# Patient Record
Sex: FEMALE | URBAN - METROPOLITAN AREA
[De-identification: names, ages, dates, MRNs, and addresses within clinical notes are randomized per-mention and may not be internally consistent; named-entity substitution may affect disease eponyms.]

---

## 2019-09-30 ENCOUNTER — TELEPHONE (OUTPATIENT)
Dept: OBSTETRICS AND GYNECOLOGY | Facility: CLINIC | Age: 79
End: 2019-09-30

## 2019-09-30 NOTE — TELEPHONE ENCOUNTER
britni    Pt wanted to speak with nurse or dr ryder regarding pains she still is having.    She did have some tests with dr dee, a ct and endoscopy. Waiting on results of other test as well.

## 2023-09-13 ENCOUNTER — HOSPITAL ENCOUNTER (INPATIENT)
Facility: HOSPITAL | Age: 83
LOS: 7 days | Discharge: HOSPICE | DRG: 246 | End: 2023-09-20
Attending: INTERNAL MEDICINE | Admitting: INTERNAL MEDICINE
Payer: MEDICARE

## 2023-09-13 DIAGNOSIS — I21.4 NSTEMI (NON-ST ELEVATED MYOCARDIAL INFARCTION): Primary | ICD-10-CM

## 2023-09-13 PROBLEM — M10.9 GOUT: Status: ACTIVE | Noted: 2023-09-13

## 2023-09-13 PROBLEM — E11.9 DIABETES MELLITUS: Status: ACTIVE | Noted: 2023-09-13

## 2023-09-13 PROBLEM — I25.10 CORONARY ARTERY DISEASE: Status: ACTIVE | Noted: 2023-09-13

## 2023-09-13 PROBLEM — J43.9 EMPHYSEMA OF LUNG: Status: ACTIVE | Noted: 2023-09-13

## 2023-09-13 PROBLEM — I10 HYPERTENSION: Status: ACTIVE | Noted: 2023-09-13

## 2023-09-13 PROBLEM — N18.9 CKD (CHRONIC KIDNEY DISEASE): Status: ACTIVE | Noted: 2023-09-13

## 2023-09-13 LAB
ALBUMIN SERPL-MCNC: 3.3 G/DL (ref 3.5–5.2)
ALBUMIN/GLOB SERPL: 1 G/DL
ALP SERPL-CCNC: 63 U/L (ref 39–117)
ALT SERPL W P-5'-P-CCNC: 10 U/L (ref 1–33)
ANION GAP SERPL CALCULATED.3IONS-SCNC: 10 MMOL/L (ref 5–15)
AST SERPL-CCNC: 18 U/L (ref 1–32)
BASOPHILS # BLD AUTO: 0.04 10*3/MM3 (ref 0–0.2)
BASOPHILS NFR BLD AUTO: 0.2 % (ref 0–1.5)
BILIRUB SERPL-MCNC: 0.3 MG/DL (ref 0–1.2)
BUN SERPL-MCNC: 20 MG/DL (ref 8–23)
BUN/CREAT SERPL: 12.1 (ref 7–25)
CALCIUM SPEC-SCNC: 8.6 MG/DL (ref 8.6–10.5)
CHLORIDE SERPL-SCNC: 107 MMOL/L (ref 98–107)
CO2 SERPL-SCNC: 24 MMOL/L (ref 22–29)
CREAT SERPL-MCNC: 1.65 MG/DL (ref 0.57–1)
D-LACTATE SERPL-SCNC: 1.1 MMOL/L (ref 0.5–2)
DEPRECATED RDW RBC AUTO: 56.8 FL (ref 37–54)
EGFRCR SERPLBLD CKD-EPI 2021: 31.1 ML/MIN/1.73
EOSINOPHIL # BLD AUTO: 0.17 10*3/MM3 (ref 0–0.4)
EOSINOPHIL NFR BLD AUTO: 1 % (ref 0.3–6.2)
ERYTHROCYTE [DISTWIDTH] IN BLOOD BY AUTOMATED COUNT: 15.5 % (ref 12.3–15.4)
GLOBULIN UR ELPH-MCNC: 3.2 GM/DL
GLUCOSE SERPL-MCNC: 118 MG/DL (ref 65–99)
HCT VFR BLD AUTO: 29.4 % (ref 34–46.6)
HGB BLD-MCNC: 9.2 G/DL (ref 12–15.9)
IMM GRANULOCYTES # BLD AUTO: 0.12 10*3/MM3 (ref 0–0.05)
IMM GRANULOCYTES NFR BLD AUTO: 0.7 % (ref 0–0.5)
INR PPP: 1.22 (ref 0.89–1.12)
LYMPHOCYTES # BLD AUTO: 1.48 10*3/MM3 (ref 0.7–3.1)
LYMPHOCYTES NFR BLD AUTO: 9.1 % (ref 19.6–45.3)
MAGNESIUM SERPL-MCNC: 1.9 MG/DL (ref 1.6–2.4)
MCH RBC QN AUTO: 31.6 PG (ref 26.6–33)
MCHC RBC AUTO-ENTMCNC: 31.3 G/DL (ref 31.5–35.7)
MCV RBC AUTO: 101 FL (ref 79–97)
MONOCYTES # BLD AUTO: 1.11 10*3/MM3 (ref 0.1–0.9)
MONOCYTES NFR BLD AUTO: 6.8 % (ref 5–12)
NEUTROPHILS NFR BLD AUTO: 13.36 10*3/MM3 (ref 1.7–7)
NEUTROPHILS NFR BLD AUTO: 82.2 % (ref 42.7–76)
NRBC BLD AUTO-RTO: 0 /100 WBC (ref 0–0.2)
NT-PROBNP SERPL-MCNC: ABNORMAL PG/ML (ref 0–1800)
PHOSPHATE SERPL-MCNC: 2.5 MG/DL (ref 2.5–4.5)
PLATELET # BLD AUTO: 193 10*3/MM3 (ref 140–450)
PMV BLD AUTO: 9.5 FL (ref 6–12)
POTASSIUM SERPL-SCNC: 4.6 MMOL/L (ref 3.5–5.2)
PROT SERPL-MCNC: 6.5 G/DL (ref 6–8.5)
PROTHROMBIN TIME: 15.5 SECONDS (ref 12.2–14.5)
RBC # BLD AUTO: 2.91 10*6/MM3 (ref 3.77–5.28)
SODIUM SERPL-SCNC: 141 MMOL/L (ref 136–145)
TROPONIN T SERPL HS-MCNC: 796 NG/L
TSH SERPL DL<=0.05 MIU/L-ACNC: 0.52 UIU/ML (ref 0.27–4.2)
URATE SERPL-MCNC: 5.6 MG/DL (ref 2.4–5.7)
WBC NRBC COR # BLD: 16.28 10*3/MM3 (ref 3.4–10.8)

## 2023-09-13 PROCEDURE — 83605 ASSAY OF LACTIC ACID: CPT | Performed by: NURSE PRACTITIONER

## 2023-09-13 PROCEDURE — 99223 1ST HOSP IP/OBS HIGH 75: CPT | Performed by: INTERNAL MEDICINE

## 2023-09-13 PROCEDURE — 85610 PROTHROMBIN TIME: CPT | Performed by: NURSE PRACTITIONER

## 2023-09-13 PROCEDURE — 85025 COMPLETE CBC W/AUTO DIFF WBC: CPT | Performed by: NURSE PRACTITIONER

## 2023-09-13 PROCEDURE — 93005 ELECTROCARDIOGRAM TRACING: CPT | Performed by: NURSE PRACTITIONER

## 2023-09-13 PROCEDURE — 83880 ASSAY OF NATRIURETIC PEPTIDE: CPT | Performed by: NURSE PRACTITIONER

## 2023-09-13 PROCEDURE — 84443 ASSAY THYROID STIM HORMONE: CPT | Performed by: NURSE PRACTITIONER

## 2023-09-13 PROCEDURE — 93010 ELECTROCARDIOGRAM REPORT: CPT | Performed by: INTERNAL MEDICINE

## 2023-09-13 PROCEDURE — 84484 ASSAY OF TROPONIN QUANT: CPT | Performed by: NURSE PRACTITIONER

## 2023-09-13 PROCEDURE — 25010000002 HEPARIN (PORCINE) 25000-0.45 UT/250ML-% SOLUTION: Performed by: NURSE PRACTITIONER

## 2023-09-13 PROCEDURE — 84100 ASSAY OF PHOSPHORUS: CPT | Performed by: NURSE PRACTITIONER

## 2023-09-13 PROCEDURE — 80053 COMPREHEN METABOLIC PANEL: CPT | Performed by: NURSE PRACTITIONER

## 2023-09-13 PROCEDURE — 84550 ASSAY OF BLOOD/URIC ACID: CPT | Performed by: NURSE PRACTITIONER

## 2023-09-13 PROCEDURE — 83735 ASSAY OF MAGNESIUM: CPT | Performed by: NURSE PRACTITIONER

## 2023-09-13 RX ORDER — BISACODYL 10 MG
10 SUPPOSITORY, RECTAL RECTAL DAILY PRN
Status: DISCONTINUED | OUTPATIENT
Start: 2023-09-13 | End: 2023-09-20 | Stop reason: HOSPADM

## 2023-09-13 RX ORDER — POLYETHYLENE GLYCOL 3350 17 G/17G
17 POWDER, FOR SOLUTION ORAL DAILY PRN
Status: DISCONTINUED | OUTPATIENT
Start: 2023-09-13 | End: 2023-09-20 | Stop reason: HOSPADM

## 2023-09-13 RX ORDER — ONDANSETRON 4 MG/1
4 TABLET, FILM COATED ORAL EVERY 6 HOURS PRN
Status: DISCONTINUED | OUTPATIENT
Start: 2023-09-13 | End: 2023-09-20 | Stop reason: HOSPADM

## 2023-09-13 RX ORDER — ATORVASTATIN CALCIUM 40 MG/1
40 TABLET, FILM COATED ORAL NIGHTLY
Status: DISCONTINUED | OUTPATIENT
Start: 2023-09-13 | End: 2023-09-20 | Stop reason: HOSPADM

## 2023-09-13 RX ORDER — ASPIRIN 81 MG/1
81 TABLET, CHEWABLE ORAL DAILY
Status: DISCONTINUED | OUTPATIENT
Start: 2023-09-14 | End: 2023-09-20 | Stop reason: HOSPADM

## 2023-09-13 RX ORDER — ALPRAZOLAM 0.25 MG/1
0.25 TABLET ORAL 2 TIMES DAILY PRN
Status: DISCONTINUED | OUTPATIENT
Start: 2023-09-13 | End: 2023-09-17

## 2023-09-13 RX ORDER — SODIUM CHLORIDE 9 MG/ML
40 INJECTION, SOLUTION INTRAVENOUS AS NEEDED
Status: DISCONTINUED | OUTPATIENT
Start: 2023-09-13 | End: 2023-09-20 | Stop reason: HOSPADM

## 2023-09-13 RX ORDER — ATORVASTATIN CALCIUM 40 MG/1
80 TABLET, FILM COATED ORAL NIGHTLY
Status: DISCONTINUED | OUTPATIENT
Start: 2023-09-13 | End: 2023-09-13

## 2023-09-13 RX ORDER — NITROGLYCERIN 0.4 MG/1
0.4 TABLET SUBLINGUAL
Status: DISCONTINUED | OUTPATIENT
Start: 2023-09-13 | End: 2023-09-14

## 2023-09-13 RX ORDER — BISACODYL 5 MG/1
5 TABLET, DELAYED RELEASE ORAL DAILY PRN
Status: DISCONTINUED | OUTPATIENT
Start: 2023-09-13 | End: 2023-09-20 | Stop reason: HOSPADM

## 2023-09-13 RX ORDER — HEPARIN SODIUM 1000 [USP'U]/ML
50 INJECTION, SOLUTION INTRAVENOUS; SUBCUTANEOUS AS NEEDED
Status: DISCONTINUED | OUTPATIENT
Start: 2023-09-13 | End: 2023-09-13 | Stop reason: SDUPTHER

## 2023-09-13 RX ORDER — HEPARIN SODIUM 1000 [USP'U]/ML
60 INJECTION, SOLUTION INTRAVENOUS; SUBCUTANEOUS ONCE
Status: CANCELLED | OUTPATIENT
Start: 2023-09-13 | End: 2023-09-13

## 2023-09-13 RX ORDER — ALLOPURINOL 100 MG/1
100 TABLET ORAL DAILY
Status: DISCONTINUED | OUTPATIENT
Start: 2023-09-14 | End: 2023-09-20 | Stop reason: HOSPADM

## 2023-09-13 RX ORDER — SODIUM CHLORIDE 0.9 % (FLUSH) 0.9 %
10 SYRINGE (ML) INJECTION EVERY 12 HOURS SCHEDULED
Status: DISCONTINUED | OUTPATIENT
Start: 2023-09-13 | End: 2023-09-20 | Stop reason: HOSPADM

## 2023-09-13 RX ORDER — HEPARIN SODIUM 10000 [USP'U]/100ML
13 INJECTION, SOLUTION INTRAVENOUS
Status: DISCONTINUED | OUTPATIENT
Start: 2023-09-13 | End: 2023-09-15

## 2023-09-13 RX ORDER — SODIUM CHLORIDE 0.9 % (FLUSH) 0.9 %
10 SYRINGE (ML) INJECTION AS NEEDED
Status: DISCONTINUED | OUTPATIENT
Start: 2023-09-13 | End: 2023-09-20 | Stop reason: HOSPADM

## 2023-09-13 RX ORDER — ONDANSETRON 2 MG/ML
4 INJECTION INTRAMUSCULAR; INTRAVENOUS EVERY 6 HOURS PRN
Status: DISCONTINUED | OUTPATIENT
Start: 2023-09-13 | End: 2023-09-20 | Stop reason: HOSPADM

## 2023-09-13 RX ORDER — HEPARIN SODIUM 1000 [USP'U]/ML
25 INJECTION, SOLUTION INTRAVENOUS; SUBCUTANEOUS AS NEEDED
Status: DISCONTINUED | OUTPATIENT
Start: 2023-09-13 | End: 2023-09-13 | Stop reason: SDUPTHER

## 2023-09-13 RX ORDER — AMOXICILLIN 250 MG
2 CAPSULE ORAL 2 TIMES DAILY
Status: DISCONTINUED | OUTPATIENT
Start: 2023-09-13 | End: 2023-09-20 | Stop reason: HOSPADM

## 2023-09-13 RX ADMIN — NITROGLYCERIN 1 INCH: 20 OINTMENT TOPICAL at 23:07

## 2023-09-13 RX ADMIN — SENNOSIDES AND DOCUSATE SODIUM 2 TABLET: 8.6; 5 TABLET ORAL at 23:03

## 2023-09-13 RX ADMIN — HEPARIN SODIUM 12 UNITS/KG/HR: 10000 INJECTION, SOLUTION INTRAVENOUS at 22:17

## 2023-09-13 RX ADMIN — Medication 10 ML: at 22:47

## 2023-09-13 RX ADMIN — ATORVASTATIN CALCIUM 40 MG: 40 TABLET, FILM COATED ORAL at 23:03

## 2023-09-13 NOTE — LETTER
EMS Transport Request  For use at [facility], [place], [place], and [place] only   Patient Name: Betty Rod : 1940   Weight:78.6 kg (173 lb 4.5 oz) Pick-up Location: [facility] BLS/ALS: BLS/ALS: BLS   Insurance: [employer] MEDICARE REPLACEMENT Auth End Date:    Pre-Cert #: D/C Summary complete: no   Destination: Home How many stairs has ramp, Will the patient be on the main level yes, Is there a ramp available yes, Can the patient stand and pivot yes, Address [address] [place] [address], and Name/contact number for who will be present Yasmin [phone]   Contact Precautions: None   Equipment (O2, Fluids, etc.): O2, settings 6 L/NC   Arrive By Date/Time: As soon as possible Stretcher/WC: Stretcher   CM Requesting: Veronika Kong RN Ext: yes   Notes/Medical Necessity: Pt bedbound, increased weakness, increased shortness of  breath with activity, oxygen dependent, home for end of life care     ______________________________________________________________________    *Only 2 patient bags OR 1 carry-on size bag are permitted.  Wheelchairs and walkers CANNOT transported with the patient. Acknowledge: Yes

## 2023-09-13 NOTE — Clinical Note
First balloon inflation max pressure = 12 prakash. First balloon inflation duration = 8 seconds. Second inflation of balloon - Max pressure = 14 prakash. 2nd Inflation of balloon - Duration = 8 seconds.

## 2023-09-14 ENCOUNTER — APPOINTMENT (OUTPATIENT)
Dept: CARDIOLOGY | Facility: HOSPITAL | Age: 83
DRG: 246 | End: 2023-09-14
Payer: MEDICARE

## 2023-09-14 ENCOUNTER — APPOINTMENT (OUTPATIENT)
Dept: GENERAL RADIOLOGY | Facility: HOSPITAL | Age: 83
DRG: 246 | End: 2023-09-14
Payer: MEDICARE

## 2023-09-14 PROBLEM — J98.11 ATELECTASIS: Status: ACTIVE | Noted: 2023-09-14

## 2023-09-14 PROBLEM — J96.00 ACUTE RESPIRATORY FAILURE: Status: ACTIVE | Noted: 2023-09-14

## 2023-09-14 PROBLEM — J90 PLEURAL EFFUSION: Status: ACTIVE | Noted: 2023-09-14

## 2023-09-14 LAB
APTT PPP: 72.7 SECONDS (ref 60–90)
B PARAPERT DNA SPEC QL NAA+PROBE: NOT DETECTED
B PERT DNA SPEC QL NAA+PROBE: NOT DETECTED
BACTERIA UR QL AUTO: ABNORMAL /HPF
BILIRUB UR QL STRIP: NEGATIVE
C PNEUM DNA NPH QL NAA+NON-PROBE: NOT DETECTED
CHOLEST SERPL-MCNC: 122 MG/DL (ref 0–200)
CLARITY UR: CLEAR
COLOR UR: YELLOW
FLUAV SUBTYP SPEC NAA+PROBE: NOT DETECTED
FLUBV RNA ISLT QL NAA+PROBE: NOT DETECTED
GEN 5 2HR TROPONIN T REFLEX: 872 NG/L
GLUCOSE BLDC GLUCOMTR-MCNC: 106 MG/DL (ref 70–130)
GLUCOSE BLDC GLUCOMTR-MCNC: 108 MG/DL (ref 70–130)
GLUCOSE BLDC GLUCOMTR-MCNC: 109 MG/DL (ref 70–130)
GLUCOSE BLDC GLUCOMTR-MCNC: 152 MG/DL (ref 70–130)
GLUCOSE BLDC GLUCOMTR-MCNC: 299 MG/DL (ref 70–130)
GLUCOSE UR STRIP-MCNC: NEGATIVE MG/DL
HADV DNA SPEC NAA+PROBE: NOT DETECTED
HBA1C MFR BLD: 6.2 % (ref 4.8–5.6)
HCOV 229E RNA SPEC QL NAA+PROBE: NOT DETECTED
HCOV HKU1 RNA SPEC QL NAA+PROBE: NOT DETECTED
HCOV NL63 RNA SPEC QL NAA+PROBE: NOT DETECTED
HCOV OC43 RNA SPEC QL NAA+PROBE: NOT DETECTED
HDLC SERPL-MCNC: 67 MG/DL (ref 40–60)
HGB UR QL STRIP.AUTO: NEGATIVE
HMPV RNA NPH QL NAA+NON-PROBE: NOT DETECTED
HPIV1 RNA ISLT QL NAA+PROBE: NOT DETECTED
HPIV2 RNA SPEC QL NAA+PROBE: NOT DETECTED
HPIV3 RNA NPH QL NAA+PROBE: NOT DETECTED
HPIV4 P GENE NPH QL NAA+PROBE: NOT DETECTED
HYALINE CASTS UR QL AUTO: ABNORMAL /LPF
KETONES UR QL STRIP: NEGATIVE
LDLC SERPL CALC-MCNC: 41 MG/DL (ref 0–100)
LDLC/HDLC SERPL: 0.63 {RATIO}
LEUKOCYTE ESTERASE UR QL STRIP.AUTO: ABNORMAL
M PNEUMO IGG SER IA-ACNC: NOT DETECTED
NITRITE UR QL STRIP: NEGATIVE
PH UR STRIP.AUTO: <=5 [PH] (ref 5–8)
PROT UR QL STRIP: NEGATIVE
QT INTERVAL: 332 MS
QTC INTERVAL: 368 MS
RBC # UR STRIP: ABNORMAL /HPF
REF LAB TEST METHOD: ABNORMAL
RHINOVIRUS RNA SPEC NAA+PROBE: NOT DETECTED
RSV RNA NPH QL NAA+NON-PROBE: NOT DETECTED
SARS-COV-2 RNA NPH QL NAA+NON-PROBE: NOT DETECTED
SP GR UR STRIP: 1.01 (ref 1–1.03)
SQUAMOUS #/AREA URNS HPF: ABNORMAL /HPF
TRIGL SERPL-MCNC: 64 MG/DL (ref 0–150)
TROPONIN T DELTA: 76 NG/L
UFH PPP CHRO-ACNC: 0.19 IU/ML (ref 0.3–0.7)
UFH PPP CHRO-ACNC: 0.44 IU/ML (ref 0.3–0.7)
UFH PPP CHRO-ACNC: 0.56 IU/ML (ref 0.3–0.7)
UROBILINOGEN UR QL STRIP: ABNORMAL
VLDLC SERPL-MCNC: 14 MG/DL (ref 5–40)
WBC # UR STRIP: ABNORMAL /HPF

## 2023-09-14 PROCEDURE — 80061 LIPID PANEL: CPT

## 2023-09-14 PROCEDURE — 81001 URINALYSIS AUTO W/SCOPE: CPT | Performed by: NURSE PRACTITIONER

## 2023-09-14 PROCEDURE — 99221 1ST HOSP IP/OBS SF/LOW 40: CPT | Performed by: INTERNAL MEDICINE

## 2023-09-14 PROCEDURE — 82948 REAGENT STRIP/BLOOD GLUCOSE: CPT

## 2023-09-14 PROCEDURE — 85730 THROMBOPLASTIN TIME PARTIAL: CPT

## 2023-09-14 PROCEDURE — 99233 SBSQ HOSP IP/OBS HIGH 50: CPT | Performed by: NURSE PRACTITIONER

## 2023-09-14 PROCEDURE — 71045 X-RAY EXAM CHEST 1 VIEW: CPT

## 2023-09-14 PROCEDURE — 83036 HEMOGLOBIN GLYCOSYLATED A1C: CPT | Performed by: NURSE PRACTITIONER

## 2023-09-14 PROCEDURE — 0202U NFCT DS 22 TRGT SARS-COV-2: CPT | Performed by: NURSE PRACTITIONER

## 2023-09-14 PROCEDURE — 25010000002 NITROGLYCERIN 200 MCG/ML SOLUTION: Performed by: NURSE PRACTITIONER

## 2023-09-14 PROCEDURE — 93010 ELECTROCARDIOGRAM REPORT: CPT | Performed by: INTERNAL MEDICINE

## 2023-09-14 PROCEDURE — 25010000002 HEPARIN (PORCINE) 25000-0.45 UT/250ML-% SOLUTION

## 2023-09-14 PROCEDURE — 84484 ASSAY OF TROPONIN QUANT: CPT | Performed by: NURSE PRACTITIONER

## 2023-09-14 PROCEDURE — 25010000002 MORPHINE PER 10 MG: Performed by: INTERNAL MEDICINE

## 2023-09-14 PROCEDURE — 85520 HEPARIN ASSAY: CPT

## 2023-09-14 PROCEDURE — 25010000002 LORAZEPAM PER 2 MG: Performed by: NURSE PRACTITIONER

## 2023-09-14 PROCEDURE — 93005 ELECTROCARDIOGRAM TRACING: CPT | Performed by: NURSE PRACTITIONER

## 2023-09-14 PROCEDURE — 25010000002 HEPARIN (PORCINE) PER 1000 UNITS

## 2023-09-14 PROCEDURE — 93306 TTE W/DOPPLER COMPLETE: CPT

## 2023-09-14 PROCEDURE — 93306 TTE W/DOPPLER COMPLETE: CPT | Performed by: INTERNAL MEDICINE

## 2023-09-14 RX ORDER — ATORVASTATIN CALCIUM 40 MG/1
40 TABLET, FILM COATED ORAL NIGHTLY
Status: ON HOLD | COMMUNITY
End: 2023-09-14

## 2023-09-14 RX ORDER — PRAVASTATIN SODIUM 40 MG
40 TABLET ORAL DAILY
COMMUNITY

## 2023-09-14 RX ORDER — NITROGLYCERIN 20 MG/100ML
10-50 INJECTION INTRAVENOUS
Status: DISCONTINUED | OUTPATIENT
Start: 2023-09-14 | End: 2023-09-19

## 2023-09-14 RX ORDER — LISINOPRIL 5 MG/1
5 TABLET ORAL
Status: DISCONTINUED | OUTPATIENT
Start: 2023-09-15 | End: 2023-09-15

## 2023-09-14 RX ORDER — CARVEDILOL 6.25 MG/1
6.25 TABLET ORAL 2 TIMES DAILY WITH MEALS
Status: ON HOLD | COMMUNITY
End: 2023-09-14

## 2023-09-14 RX ORDER — ERGOCALCIFEROL 1.25 MG/1
50000 CAPSULE ORAL WEEKLY
COMMUNITY
End: 2023-09-20 | Stop reason: HOSPADM

## 2023-09-14 RX ORDER — PANTOPRAZOLE SODIUM 40 MG/10ML
40 INJECTION, POWDER, LYOPHILIZED, FOR SOLUTION INTRAVENOUS
Status: DISCONTINUED | OUTPATIENT
Start: 2023-09-15 | End: 2023-09-14

## 2023-09-14 RX ORDER — PANTOPRAZOLE SODIUM 40 MG/10ML
40 INJECTION, POWDER, LYOPHILIZED, FOR SOLUTION INTRAVENOUS
Status: DISCONTINUED | OUTPATIENT
Start: 2023-09-14 | End: 2023-09-18

## 2023-09-14 RX ORDER — ASPIRIN 81 MG/1
81 TABLET ORAL DAILY
Status: ON HOLD | COMMUNITY
End: 2023-09-14

## 2023-09-14 RX ORDER — HYDROCODONE BITARTRATE AND ACETAMINOPHEN 7.5; 325 MG/1; MG/1
1 TABLET ORAL 3 TIMES DAILY PRN
COMMUNITY

## 2023-09-14 RX ORDER — ALPRAZOLAM 0.5 MG/1
0.5 TABLET ORAL 3 TIMES DAILY PRN
COMMUNITY
End: 2023-09-20 | Stop reason: HOSPADM

## 2023-09-14 RX ORDER — CLOPIDOGREL BISULFATE 75 MG/1
300 TABLET ORAL ONCE
Status: COMPLETED | OUTPATIENT
Start: 2023-09-14 | End: 2023-09-14

## 2023-09-14 RX ORDER — IPRATROPIUM BROMIDE AND ALBUTEROL SULFATE 2.5; .5 MG/3ML; MG/3ML
3 SOLUTION RESPIRATORY (INHALATION) EVERY 6 HOURS PRN
Status: DISCONTINUED | OUTPATIENT
Start: 2023-09-14 | End: 2023-09-20 | Stop reason: HOSPADM

## 2023-09-14 RX ORDER — LORAZEPAM 2 MG/ML
0.5 INJECTION INTRAMUSCULAR ONCE
Status: COMPLETED | OUTPATIENT
Start: 2023-09-14 | End: 2023-09-14

## 2023-09-14 RX ORDER — CALCIUM CARBONATE 500 MG/1
2 TABLET, CHEWABLE ORAL 3 TIMES DAILY PRN
Status: DISCONTINUED | OUTPATIENT
Start: 2023-09-14 | End: 2023-09-20 | Stop reason: HOSPADM

## 2023-09-14 RX ORDER — ALLOPURINOL 100 MG/1
100 TABLET ORAL DAILY
COMMUNITY

## 2023-09-14 RX ORDER — RAMIPRIL 2.5 MG/1
2.5 CAPSULE ORAL DAILY
COMMUNITY
End: 2023-09-20 | Stop reason: HOSPADM

## 2023-09-14 RX ORDER — HEPARIN SODIUM 1000 [USP'U]/ML
1000 INJECTION, SOLUTION INTRAVENOUS; SUBCUTANEOUS ONCE
Status: COMPLETED | OUTPATIENT
Start: 2023-09-14 | End: 2023-09-14

## 2023-09-14 RX ORDER — MORPHINE SULFATE 2 MG/ML
2 INJECTION, SOLUTION INTRAMUSCULAR; INTRAVENOUS ONCE
Status: COMPLETED | OUTPATIENT
Start: 2023-09-14 | End: 2023-09-14

## 2023-09-14 RX ORDER — MORPHINE SULFATE 2 MG/ML
2 INJECTION, SOLUTION INTRAMUSCULAR; INTRAVENOUS ONCE
Status: DISCONTINUED | OUTPATIENT
Start: 2023-09-14 | End: 2023-09-14 | Stop reason: SDUPTHER

## 2023-09-14 RX ORDER — AMLODIPINE BESYLATE 5 MG/1
5 TABLET ORAL DAILY
COMMUNITY

## 2023-09-14 RX ORDER — BUMETANIDE 0.5 MG/1
0.5 TABLET ORAL DAILY
COMMUNITY
End: 2023-09-20 | Stop reason: HOSPADM

## 2023-09-14 RX ORDER — AMLODIPINE BESYLATE 5 MG/1
5 TABLET ORAL
Status: DISCONTINUED | OUTPATIENT
Start: 2023-09-14 | End: 2023-09-20 | Stop reason: HOSPADM

## 2023-09-14 RX ORDER — CLOPIDOGREL BISULFATE 75 MG/1
75 TABLET ORAL DAILY
Status: DISCONTINUED | OUTPATIENT
Start: 2023-09-15 | End: 2023-09-20 | Stop reason: HOSPADM

## 2023-09-14 RX ADMIN — METOPROLOL TARTRATE 25 MG: 25 TABLET, FILM COATED ORAL at 20:34

## 2023-09-14 RX ADMIN — SENNOSIDES AND DOCUSATE SODIUM 2 TABLET: 8.6; 5 TABLET ORAL at 20:34

## 2023-09-14 RX ADMIN — ALLOPURINOL 100 MG: 100 TABLET ORAL at 10:14

## 2023-09-14 RX ADMIN — NITROGLYCERIN 10 MCG/MIN: 20 INJECTION INTRAVENOUS at 06:40

## 2023-09-14 RX ADMIN — ASPIRIN 81 MG: 81 TABLET, CHEWABLE ORAL at 10:14

## 2023-09-14 RX ADMIN — MORPHINE SULFATE 2 MG: 2 INJECTION, SOLUTION INTRAMUSCULAR; INTRAVENOUS at 19:22

## 2023-09-14 RX ADMIN — CALCIUM CARBONATE (ANTACID) CHEW TAB 500 MG 2 TABLET: 500 CHEW TAB at 19:09

## 2023-09-14 RX ADMIN — HEPARIN SODIUM 13 UNITS/KG/HR: 10000 INJECTION, SOLUTION INTRAVENOUS at 22:47

## 2023-09-14 RX ADMIN — NITROGLYCERIN 1 INCH: 20 OINTMENT TOPICAL at 18:13

## 2023-09-14 RX ADMIN — NITROGLYCERIN 1 INCH: 20 OINTMENT TOPICAL at 06:28

## 2023-09-14 RX ADMIN — Medication 10 ML: at 20:35

## 2023-09-14 RX ADMIN — Medication 10 ML: at 11:02

## 2023-09-14 RX ADMIN — NITROGLYCERIN 1 INCH: 20 OINTMENT TOPICAL at 23:06

## 2023-09-14 RX ADMIN — AMLODIPINE BESYLATE 5 MG: 5 TABLET ORAL at 12:42

## 2023-09-14 RX ADMIN — CLOPIDOGREL BISULFATE 300 MG: 75 TABLET ORAL at 12:42

## 2023-09-14 RX ADMIN — ALPRAZOLAM 0.25 MG: 0.25 TABLET ORAL at 20:34

## 2023-09-14 RX ADMIN — Medication 12.5 MG: at 11:01

## 2023-09-14 RX ADMIN — HEPARIN SODIUM 1000 UNITS: 1000 INJECTION INTRAVENOUS; SUBCUTANEOUS at 11:53

## 2023-09-14 RX ADMIN — ATORVASTATIN CALCIUM 40 MG: 40 TABLET, FILM COATED ORAL at 20:34

## 2023-09-14 RX ADMIN — PANTOPRAZOLE SODIUM 40 MG: 40 INJECTION, POWDER, LYOPHILIZED, FOR SOLUTION INTRAVENOUS at 19:09

## 2023-09-14 RX ADMIN — LORAZEPAM 0.5 MG: 2 INJECTION INTRAMUSCULAR; INTRAVENOUS at 06:48

## 2023-09-14 NOTE — PLAN OF CARE
Goal Outcome Evaluation:  Plan of Care Reviewed With: patient, daughter              -Pt arrived at 2200 last night lethargic and disoriented x1 with no complaints of CP  -At 0600 this am, pt became anxious, agitated, confused, and began complaining of CP rated 7/10 radiating to right shoulder  -EKG obtained, Nitro gtt ordered and infusing at 10 mcg/min, 1x push of Ativan given  -Hypertensive and titrated back up to 6L NC this am after episode   -Heparin gtt cont at 12 units/kg/hr  -Family updated at bedside          Problem: Adult Inpatient Plan of Care  Goal: Absence of Hospital-Acquired Illness or Injury  Intervention: Identify and Manage Fall Risk  Recent Flowsheet Documentation  Taken 9/14/2023 0600 by Keisha Lomas, RN  Safety Promotion/Fall Prevention:   activity supervised   assistive device/personal items within reach   clutter free environment maintained   fall prevention program maintained   lighting adjusted   toileting scheduled   safety round/check completed   room organization consistent  Taken 9/14/2023 0400 by Keisha Lomas, RN  Safety Promotion/Fall Prevention:   activity supervised   assistive device/personal items within reach   clutter free environment maintained   fall prevention program maintained   lighting adjusted   toileting scheduled   safety round/check completed   room organization consistent  Taken 9/14/2023 0200 by Keisha Lomas, RN  Safety Promotion/Fall Prevention:   activity supervised   assistive device/personal items within reach   clutter free environment maintained   fall prevention program maintained   lighting adjusted   toileting scheduled   safety round/check completed   room organization consistent  Taken 9/14/2023 0000 by Keisha Lomas, RN  Safety Promotion/Fall Prevention:   activity supervised   assistive device/personal items within reach   clutter free environment maintained   fall prevention program maintained   lighting adjusted   toileting scheduled   safety round/check  completed   room organization consistent  Taken 9/13/2023 2200 by Keisha Lomas RN  Safety Promotion/Fall Prevention:   activity supervised   clutter free environment maintained   fall prevention program maintained   assistive device/personal items within reach   lighting adjusted   toileting scheduled   safety round/check completed   room organization consistent     Problem: Adult Inpatient Plan of Care  Goal: Absence of Hospital-Acquired Illness or Injury  Intervention: Prevent Skin Injury  Recent Flowsheet Documentation  Taken 9/14/2023 0600 by Keisha Lomsa RN  Body Position: position changed independently  Skin Protection:   adhesive use limited   incontinence pads utilized   tubing/devices free from skin contact   transparent dressing maintained   skin-to-skin areas padded   skin-to-device areas padded  Taken 9/14/2023 0400 by Keisha Lomas RN  Body Position:   position changed independently   turned   right  Skin Protection:   adhesive use limited   incontinence pads utilized   tubing/devices free from skin contact   transparent dressing maintained   skin-to-skin areas padded   skin-to-device areas padded  Taken 9/14/2023 0200 by Keisha Lomas RN  Body Position: position changed independently  Skin Protection:   adhesive use limited   incontinence pads utilized   tubing/devices free from skin contact   skin-to-skin areas padded   skin-to-device areas padded   transparent dressing maintained  Taken 9/14/2023 0000 by Keisha Lomas RN  Body Position: position changed independently  Skin Protection:   adhesive use limited   incontinence pads utilized   transparent dressing maintained   tubing/devices free from skin contact   skin-to-skin areas padded   skin-to-device areas padded  Taken 9/13/2023 2200 by Keisha Lomas RN  Body Position: position changed independently  Skin Protection:   adhesive use limited   incontinence pads utilized   pulse oximeter probe site changed   skin-to-device areas padded   skin-to-skin areas  padded   transparent dressing maintained   tubing/devices free from skin contact     Problem: Adult Inpatient Plan of Care  Goal: Optimal Comfort and Wellbeing  Intervention: Monitor Pain and Promote Comfort  Recent Flowsheet Documentation  Taken 9/14/2023 0600 by Keisha Lomas, RN  Pain Management Interventions:   pillow support provided   position adjusted   see MAR   breathing exercises   pain management plan reviewed with patient/caregiver   relaxation techniques promoted   therapeutic touch utilized  Taken 9/14/2023 0400 by Keisha Lomas, RN  Pain Management Interventions:   pillow support provided   position adjusted  Taken 9/14/2023 0200 by Keisha Lomas, RN  Pain Management Interventions:   pillow support provided   position adjusted   care clustered  Taken 9/14/2023 0000 by Keisha Lomas, RN  Pain Management Interventions:   pillow support provided   position adjusted  Taken 9/13/2023 2200 by Keisha Lomas, RN  Pain Management Interventions:   position adjusted   pillow support provided

## 2023-09-14 NOTE — PLAN OF CARE
Goal Outcome Evaluation:   Patient agitated this morning, confused and uncooroperative. Mentation improved around 1030am and patient more accepting and cooroperative. Nitro gtt turned off this am, heparin remains infusing per pharmacy. BM in beside commode, good appetite. Patient anxious, reporting crushing chest pain again at 1800. NP paged and nitro paste given, tums and pantoprazole. No relief. Cardiology paged, 2mg of morphine given x1 with good relief.

## 2023-09-14 NOTE — H&P
"INTENSIVIST   INITIAL HOSPITAL VISIT NOTE     Hospital:  LOS: 0 days     Ms. Betty Rod, 82 y.o. female is seen for a Chief Complaint of:      NSTEMI (non-ST elevated myocardial infarction)    CKD (chronic kidney disease)    Coronary artery disease    Diabetes mellitus    Emphysema of lung    Gout    Hypertension      Subjective   S     Betty Rod is a 82 y.o. female who has a PMHX of CAD (s/p CABG) who was transferred to our facility for work up for potential high-risk PCI.      Patient was [facility] from [facility] w/ NSTEMI with a LHC on 9/12 which showed multiple re-occlusions.  No intervention was performed.  She developed increased CP and troponin elevation on 9/13 and so was transferred to our facility for higher level of care.    Records available from [facility] are only up to 9/9.  They indiate that she may have had a UTI upon admission and was started on Rocephin.  There is mention of a planned CT of the chest due to \"paratracheal fullness\".  We do not have the results of these tests.  We will contact them to obtain full records.      4 minutes of critical care provided by DANNY Jones in addendum accordance with split/shared billing. This time excludes other billable procedures. Time does include preparation of documents, medical consultations, review of old records, and direct bedside care. Patient is at high risk for life-threatening deterioration due to NSTEMI.   Electronically signed by DANNY Wise, 09/13/23, 8:10 PM EDT.          PMH: She  has a past medical history of Anxiety, CKD (chronic kidney disease), Coronary artery disease, Diabetes mellitus, Emphysema of lung, Gout, Hypertension, and Osteoarthritis.   PSxH: She  has a past surgical history that includes Coronary artery bypass graft.      Medications:  No current facility-administered medications on file prior to encounter.     No current outpatient medications on file prior to " encounter.       Allergies: She is allergic to amoxicillin.   FH: Her family history includes Cancer in her brother, mother, and sister; Heart failure in her father and mother.   SH: She  reports that she quit smoking about 23 years ago. Her smoking use included cigarettes. She does not have any smokeless tobacco history on file. She reports that she does not drink alcohol and does not use drugs.     The patient's relevant past medical, surgical and social history were reviewed and updated in Epic as appropriate.     ROS (14):   Review of Systems   Constitutional:  Positive for diaphoresis.   Respiratory:  Positive for shortness of breath.    Cardiovascular:  Positive for chest pain.   Gastrointestinal:  Positive for nausea and vomiting.     Objective   O     Vitals    Temp  Min: 98.4 °F (36.9 °C)  Max: 98.4 °F (36.9 °C)  BP  Min: 128/61  Max: 128/61  Pulse  Min: 80  Max: 80  No data recorded  SpO2  Min: 95 %  Max: 95 % No data recorded     I/O 24 hrs (7:00AM - 6:59 AM)  Intake/Output       None            Medications (drips):      Physical Examination    Telemetry: Normal sinus rhythm.    Constitutional:  No acute distress.  Conversant. Sitting up in bed on nasal cannula.    HEENT: Normocephalic and atraumatic.   Neck:  Normal range of motion. Neck supple.   No JVD present.   No thyromegaly.    Cardiovascular: Regular rate and rhythm.  No murmurs, rub or gallop.  No peripheral edema.   Respiratory: Normal respiratory effort.  Diminished bilaterally. No wheezing.    Abdominal:  Soft. No masses.   Non-tender. No distension.   No hepatosplenomegaly.   MSK: Normal muscle strength and tone.   Extremities: No digital cyanosis or clubbing.   Skin: Skin is warm and dry.  No rashes, lesions or ulcers noted.    Neurological:   Alert and interactive though slow to respond.   Moves all extremities.   Psychiatric:  Normal affect.   Normal judgment.           Results from last 7 days   Lab Units 09/13/23  2219   WBC 10*3/mm3  16.28*   HEMOGLOBIN g/dL 9.2*   MCV fL 101.0*   PLATELETS 10*3/mm3 193     Results from last 7 days   Lab Units 09/13/23  2219   SODIUM mmol/L 141   POTASSIUM mmol/L 4.6   CO2 mmol/L 24.0   CREATININE mg/dL 1.65*   MAGNESIUM mg/dL 1.9   PHOSPHORUS mg/dL 2.5     CrCl cannot be calculated (Unknown ideal weight.).  Results from last 7 days   Lab Units 09/13/23  2219   ALK PHOS U/L 63   BILIRUBIN mg/dL 0.3   ALT (SGPT) U/L 10   AST (SGOT) U/L 18               Images:    Imaging Results (Last 24 Hours)       ** No results found for the last 24 hours. **          ECG/EMG Results (last 24 hours)       ** No results found for the last 24 hours. **            I reviewed the patient's new laboratory and imaging results.  I independently reviewed the patient's new images.    Assessment & Plan   A / P     Ms. Rod is a 81yo F with a history of CAD s/p CABG, CKD, and gout who was hospitalized at [facility] on 9/8/23 for NSTEMI. She was noted to have elevated creatinine and LHC was delayed until 9/12/13. LHC reportedly showed multiple re-occlusions and no intervention was performed. We do not have these records as the records that arrived with the patient are only through 9/9.     She developed recurrent chest pain on 9/13/23 and transfer was requested to [facility] for review by our Interventional Cardiologists.     Upon arrival, she is currently chest pain free.       Nutrition:   NPO Diet NPO Type: Strict NPO  Advance Directives:   Code Status and Medical Interventions:   Ordered at: 09/13/23 2206     Code Status (Patient has no pulse and is not breathing):    CPR (Attempt to Resuscitate)     Medical Interventions (Patient has pulse or is breathing):    Full Support         NSTEMI (non-ST elevated myocardial infarction)    CKD (chronic kidney disease)    Coronary artery disease    Diabetes mellitus    Emphysema of lung    Gout    Hypertension      Assessment / Plan:    Admit to ICU  EKG now.   Continue heparin infusion.  Check  labs  Nitro paste for recurrent chest pain as there is a national shortage of IV Nitroglycerin. Will plan to start a drip if she develops chest pain not responsive to nitro-paste.   Cardiology to see in the AM  Echocardiogram  AM labs    High risk secondary to ongoing going chest pain in the setting of CAD.     Plan of care and goals reviewed during interdisciplinary rounds.  I discussed the patient's findings and my recommendations with patient and nursing staff      Eliane SCALES Case, DO  Pulmonary and Critical Care Medicine

## 2023-09-14 NOTE — CONSULTS
Date of Hospital Visit: 23  Encounter Provider: Makenna Rios PA-C  Place of Service: [facility]  Patient Name: Betty Rod  :1940  Referral Provider: Boy Yao MD  Primary Care Provider: Boy Esparza PA-C, [facility] in [place], [place]    Chief Complaint/Reason for Consultation: NSTEMI w/ persistent chest pain, recent negative [facility]    Problem List:  Coronary artery disease  CABG x2 SVG to diagonal, LIMA to LAD, 20+ years ago, [facility]  NSTEMI, 2023  Echocardiogram (2023): EF 55 +/- 5%. Moderate, concentric, LVH.  Increased LA pressure (grade II diastolic dysfunction).  Moderate LA enlargement.  Mild MR and TR.  LHC (2023): Severe multivessel CAD. Severe ostial left main stenosis.  of the LAD, LCX and RCA. Occluded SVG to diagonal and OM. Patent LIMA to LAD.  Angiography reviewed by Dr. Jamison.  The native coronary arteries are all functionally occluded.  LIMA to LAD is patent.  The LAD also gives some collaterals of the circumflex and the RCA distribution.  No further interventional or surgical revascularization options are available.  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   Gout  Osteoarthritis  Anxiety/depression  Chronic pain  Former cigarette smoker, quit   Surgical history:  CABG  Gallbladder surgery    Cardiac Risk Factors: CAD, HTN, HDL, DM, former tobacco use, advanced age    History of Present Illness:  Betty Rod is a 82 y.o. female with the above noted medical history who presented to [facility] with complaints of 2 days of chest pain and vomiting on . He was found to have an NSTEMI with HSTof 500. She was then  "transferred to [facility] for further cardiac evaluation. She was started on IV heparin and antiplatelet therapy. She was found to have an elevated Cr which delayed the heart catheterization which was eventually performed on 09/12 with the above listed results. Chest XR on 09/13 revealed bilateral opacity possible pneumonia vs pulmonary edema. Nephrology was consulted due to an SILVANA. Patient was started on a nitroglycerin gtt, p.o. Imdur, 40 mg IV lasix x1, and antibiotics. Chest persisted while on nitro gtt and Imdur, and repeat troponin increased to 1,600 for which heparin was restarted. Cardiologist, Dr. Watson, recommended a high risk PCI evaluation which could not be done there so she was then transferred here to the ICU overnight for higher level of care.     Upon evaluation at [facility], findings were significant for an elevated pro-BNP of 16,577, , 872, troponin delta 76, Cr 1.65, elevated WBC count, anemia with H&H 9.2, 29.4, and evidence of a UTI. CXR with mild bilateral basilar atelectasis greater on the left than the right with a trace left effusion. Respiratory PCR negative.     Today, Ms. Rod reports that she is having intermittent chest pain. Daughters are present in room to assist with history as patient is somewhat confused. Patient states she is \"mentally off\" today and is having trouble answering questions due to this. Daughter reporting she had a CABG ~20 years ago here at [facility] and had a \"bad experience with psychosis in the ICU.\" Her symptoms prior to the bypass are similar to her current symptoms with nausea, vomiting and chest pain. After her CABG, the patient did not follow-up with a cardiologist as she \"refused to go.\" The only medical provider she sees is her PCP named Boy Esparza PA-C with [facility] in [place], [place].     In terms of her cardiac history, she is unsure if she has had any previous stents, denies any known CVA, TIA, arrhythmias, heart failure or " pulmonary emboli. Former smoker with cessation in . Daughter states she has a history of several clots in her legs over the years. Her BLE edema is well controlled at home on Bumex. At baseline, the patient is able to ambulate in her home with occasional assistance of her walker. Her family helps her with her house chores and errands as needed. She denies jaw/arm/neck pain, palpitations, shortness of breath, edema, lightheadedness, dizziness, presyncope, syncope, diarrhea, abdominal discomfort, numbness or tingling. In terms of her wishes, patient is hesitant about having another CABG and declines being on dialysis or intubated for a long time if her status required this.    At the time of my evaluation patient was free of any chest pain and asked whether she can eat.  Her lifestyle is sedentary however she is independent for activities of daily living.      Past Medical History:   Diagnosis Date    Anxiety     CKD (chronic kidney disease)     Coronary artery disease     Diabetes mellitus     Emphysema of lung     Gout     Hypertension     Osteoarthritis      Past Surgical History:   Procedure Laterality Date    CARDIAC SURGERY      CORONARY ARTERY BYPASS GRAFT      GALLBLADDER SURGERY       No medications prior to admission.     allopurinol, 100 mg, Oral, Daily  aspirin, 81 mg, Oral, Daily  atorvastatin, 40 mg, Oral, Nightly  metoprolol tartrate, 12.5 mg, Oral, Q12H  senna-docusate sodium, 2 tablet, Oral, BID  sodium chloride, 10 mL, Intravenous, Q12H      Social History     Socioeconomic History    Marital status: Unknown   Tobacco Use    Smoking status: Former     Types: Cigarettes     Quit date:      Years since quittin.7    Smokeless tobacco: Never   Vaping Use    Vaping Use: Never used   Substance and Sexual Activity    Alcohol use: Never    Drug use: Never    Sexual activity: Never     Family History   Problem Relation Age of Onset    Cancer Mother     Heart failure Mother     Heart failure  "Father     Cancer Sister     Cancer Brother        REVIEW OF SYSTEMS:   12 point ROS was performed and is negative except as outlined in HPI     Objective:     Vitals:    09/14/23 0700 09/14/23 0715 09/14/23 0730 09/14/23 0745   BP: 171/82 159/72 121/58 113/52   BP Location:       Patient Position:       Pulse: 90 80 66 66   Resp:       Temp:       TempSrc:       SpO2: 92% 92% 96% 97%   Weight:       Height:         Body mass index is 32.78 kg/m².  Flowsheet Rows      Flowsheet Row First Filed Value   Admission Height 154.9 cm (61\") Documented at 09/13/2023 2318   Admission Weight 78.7 kg (173 lb 8 oz) Documented at 09/13/2023 2200            Physical Exam   General: No acute distress, well developed, and well nourished. Appears to be stated age. NC in place.  Skin: Skin is warm and dry. No obvious cyanosis, erythema or pallor.   HEENT: Pupils are equal, round and reactive to light. Atraumatic, normocephalic, no conjunctival pallor and no scleral icterus.   Neck: Supple. No JVD. Normal carotid upstrokes with no carotid bruits.    Chest: No respiratory distress. No chest wall tenderness. Diminished breath sounds at bases bilaterally. Breath sounds are normal.  Few scattered rhonchi  Cardiovascular: Normal S1 and S2. No murmur, gallop or rub. PMI is not displaced.    Pulses: Radial pulses are 2+ and equal bilaterally. DP and PT pulses are 1+ and symmetric.    Abdomen: Soft, nontender, normal bowel sounds.   Musculoskeletal/Extremities:  No clubbing, cyanosis or edema. No gross deformity.   Neurological: Alert and oriented to person, place, and time. Mild deficit in cognition.  Psychiatric: Somewhat anxious. Normal mood and affect. Speech and behavior is normal.    Lab Review:   Results from last 7 days   Lab Units 09/13/23  2219   SODIUM mmol/L 141   POTASSIUM mmol/L 4.6   CHLORIDE mmol/L 107   CO2 mmol/L 24.0   BUN mg/dL 20   CREATININE mg/dL 1.65*   GLUCOSE mg/dL 118*   CALCIUM mg/dL 8.6         Lab 09/14/23  0049 " 09/13/23 2219 09/13/23 1804 09/08/23 1923   PROBNP  --  16,577.0*  --   --    HSTROP T 872* 796*  --   --    PROTIME  --  15.5* 12.3 11.9   INR  --  1.22* 1.15 1.11     Results from last 7 days   Lab Units 09/13/23 2219   WBC 10*3/mm3 16.28*   HEMOGLOBIN g/dL 9.2*   HEMATOCRIT % 29.4*   PLATELETS 10*3/mm3 193     Results from last 7 days   Lab Units 09/14/23  0344 09/13/23 2219 09/13/23 1804 09/08/23 1923   INR   --  1.22* 1.15 1.11   APTT seconds 72.7  --   --   --      Results from last 7 days   Lab Units 09/13/23 2219   MAGNESIUM mg/dL 1.9     Lab Results   Component Value Date    CHOL 122 09/14/2023    TRIG 64 09/14/2023    HDL 67 (H) 09/14/2023    LDL 41 09/14/2023     Lab Results   Component Value Date    HGBA1C 6.20 (H) 09/14/2023     Respiratory PCR: negative    CXR(09/14/2023):  Impression:  Mild bilateral basilar atelectasis greater on the left than the right with a trace left effusion.     Echocardiogram (09/09/2023): EF 55 +/- 5%. Moderate, concentric, LVH.  Increased LA pressure (grade II diastolic dysfunction).  Moderate LA enlargement.  Mild MR and TR.    EKG (09/14/2023): EKG shows sinus rhythm with nonspecific ST changes.       Assessment:   NSTEMI/Coronary artery disease s/p CABG/Progressive angina  CABG 20+ years ago. Symptoms prior to CABG: nausea, vomiting and chest pain.  Echocardiogram (09/09/2023), [facility]: EF 55 +/- 5%. Moderate, concentric, LVH.  Increased LA pressure (grade II diastolic dysfunction).  Moderate LA enlargement.  Mild MR and TR.  [facility] (09/12/2023): Severe multivessel CAD. Severe ostial left main stenosis.  of the LAD, LCX and RCA. Occluded SVG to diagonal and OM. Patent LIMA to LAD.  EKG without acute ischemic changes.  , 872  Intermittent chest pain which has now resolved..  NTG gtt discontinued due to national shortage of IV NTG and replaced with nitroglycerin paste due to national shortage of IV NTG patient is currently chest  pain-free    Bilateral atelectasis/Trace left effusion/Acute hypoxemic respiratory failure  Not on oxygen at home.  On Bumex 0.5 mg daily at home for fluid retention  Pro-BNP 16,577  Currently on 6 L/min NC with SpO2 of 97%    Hypertension  Home BP medications: amlodipine 5 mg daily, ramipril 2.5 mg daily and metoprolol tartrate 25 mg daily  Current BP: 136/59 mmHg, 73 bpm    Hyperlipidemia LDL goal <70  On pravastatin 40 mg daily at home.  Lipid panel (09/14/2023): On target    Plan:   Patient's coronary angiography was reviewed, she has functionally occluded native coronary arteries with long segments of occlusions as well as severe left main coronary artery stenosis.  Anatomy is not suitable for surgical or catheter-based revascularization.    All of the vein bypass grafts are occluded.  Fortunately she has a patent LIMA graft to the LAD, the LAD is also serving some collaterals to the occluded circumflex and RCA territory.    Continue medical therapy is her best, safest and probably the only treatment option at this time.    Since she is free of chest pain, we will continue current medical management, restart home dose of lisinopril and amlodipine.  Increase metoprolol to 25 mg twice daily.   Continue Nitropaste which will be transitioned to Imdur prior to discharge.    Continue IV heparin for another 24 hours, will give loading dose of Plavix followed by 75 mg daily as well as low-dose aspirin for dual antiplatelet therapy   Continue current statin therapy.  Lipid profile is on target.  Goal of treatment is optimal antianginal therapy to adequately control her angina.    We will obtain echocardiogram to assess current LV function and to further guide her medical management.    Further optimization of antianginal therapy will include adjustment of dose of nitrates and addition of Ranexa.    Thank you for this consultation, we will follow.      Makenna Rios PA-C functioning as a scribe for Jacinta Jamison MD,  FACC.    I, Jacinta Jamison MD, personally performed the services described in this documentation as scribed by the above named individual in my presence, and it is both accurate and complete.  9/14/2023  17:05 EDT

## 2023-09-15 PROBLEM — I21.9 TYPE 1 MYOCARDIAL INFARCTION: Status: ACTIVE | Noted: 2023-09-15

## 2023-09-15 LAB
ANION GAP SERPL CALCULATED.3IONS-SCNC: 11 MMOL/L (ref 5–15)
BASOPHILS # BLD AUTO: 0.05 10*3/MM3 (ref 0–0.2)
BASOPHILS NFR BLD AUTO: 0.4 % (ref 0–1.5)
BH CV ECHO MEAS - AO MAX PG: 6.5 MMHG
BH CV ECHO MEAS - AO MEAN PG: 4 MMHG
BH CV ECHO MEAS - AO ROOT DIAM: 3.3 CM
BH CV ECHO MEAS - AO V2 MAX: 127 CM/SEC
BH CV ECHO MEAS - AO V2 VTI: 23 CM
BH CV ECHO MEAS - AVA(I,D): 2.8 CM2
BH CV ECHO MEAS - EDV(CUBED): 132.7 ML
BH CV ECHO MEAS - EDV(MOD-SP2): 89.5 ML
BH CV ECHO MEAS - EDV(MOD-SP4): 119 ML
BH CV ECHO MEAS - EF(MOD-BP): 71.1 %
BH CV ECHO MEAS - EF(MOD-SP2): 61.8 %
BH CV ECHO MEAS - EF(MOD-SP4): 77.5 %
BH CV ECHO MEAS - ESV(CUBED): 24.4 ML
BH CV ECHO MEAS - ESV(MOD-SP2): 34.2 ML
BH CV ECHO MEAS - ESV(MOD-SP4): 26.8 ML
BH CV ECHO MEAS - FS: 43.1 %
BH CV ECHO MEAS - IVS/LVPW: 1 CM
BH CV ECHO MEAS - IVSD: 1.2 CM
BH CV ECHO MEAS - LA DIMENSION: 4.1 CM
BH CV ECHO MEAS - LAT PEAK E' VEL: 10.2 CM/SEC
BH CV ECHO MEAS - LV MASS(C)D: 241.2 GRAMS
BH CV ECHO MEAS - LV MAX PG: 3.3 MMHG
BH CV ECHO MEAS - LV MEAN PG: 2 MMHG
BH CV ECHO MEAS - LV V1 MAX: 90.7 CM/SEC
BH CV ECHO MEAS - LV V1 VTI: 17 CM
BH CV ECHO MEAS - LVIDD: 5.1 CM
BH CV ECHO MEAS - LVIDS: 2.9 CM
BH CV ECHO MEAS - LVOT AREA: 3.8 CM2
BH CV ECHO MEAS - LVOT DIAM: 2.2 CM
BH CV ECHO MEAS - LVPWD: 1.2 CM
BH CV ECHO MEAS - MED PEAK E' VEL: 6.3 CM/SEC
BH CV ECHO MEAS - MV A MAX VEL: 116 CM/SEC
BH CV ECHO MEAS - MV DEC SLOPE: 421 CM/SEC2
BH CV ECHO MEAS - MV DEC TIME: 0.24 MSEC
BH CV ECHO MEAS - MV E MAX VEL: 103 CM/SEC
BH CV ECHO MEAS - MV E/A: 0.89
BH CV ECHO MEAS - MV MAX PG: 8.2 MMHG
BH CV ECHO MEAS - MV MEAN PG: 4 MMHG
BH CV ECHO MEAS - MV P1/2T: 85.6 MSEC
BH CV ECHO MEAS - MV V2 VTI: 28.9 CM
BH CV ECHO MEAS - MVA(P1/2T): 2.6 CM2
BH CV ECHO MEAS - MVA(VTI): 2.23 CM2
BH CV ECHO MEAS - PA ACC TIME: 0.12 SEC
BH CV ECHO MEAS - PA V2 MAX: 129.5 CM/SEC
BH CV ECHO MEAS - PAPD(PI EDV): 4 MMHG
BH CV ECHO MEAS - PI END-D VEL: 106 CM/SEC
BH CV ECHO MEAS - RAP SYSTOLE: 8 MMHG
BH CV ECHO MEAS - RVSP: 48 MMHG
BH CV ECHO MEAS - SV(LVOT): 64.5 ML
BH CV ECHO MEAS - SV(MOD-SP2): 55.3 ML
BH CV ECHO MEAS - SV(MOD-SP4): 92.2 ML
BH CV ECHO MEAS - TAPSE (>1.6): 2.19 CM
BH CV ECHO MEAS - TR MAX PG: 39.9 MMHG
BH CV ECHO MEAS - TR MAX VEL: 316 CM/SEC
BH CV ECHO MEASUREMENTS AVERAGE E/E' RATIO: 12.48
BH CV VAS BP LEFT ARM: NORMAL MMHG
BH CV XLRA - RV BASE: 4.5 CM
BH CV XLRA - RV LENGTH: 7 CM
BH CV XLRA - RV MID: 3.6 CM
BH CV XLRA - TDI S': 13.9 CM/SEC
BUN SERPL-MCNC: 26 MG/DL (ref 8–23)
BUN/CREAT SERPL: 16.8 (ref 7–25)
CALCIUM SPEC-SCNC: 9.1 MG/DL (ref 8.6–10.5)
CHLORIDE SERPL-SCNC: 106 MMOL/L (ref 98–107)
CO2 SERPL-SCNC: 23 MMOL/L (ref 22–29)
CREAT SERPL-MCNC: 1.55 MG/DL (ref 0.57–1)
DEPRECATED RDW RBC AUTO: 58.7 FL (ref 37–54)
EGFRCR SERPLBLD CKD-EPI 2021: 33.5 ML/MIN/1.73
EOSINOPHIL # BLD AUTO: 0.13 10*3/MM3 (ref 0–0.4)
EOSINOPHIL NFR BLD AUTO: 0.9 % (ref 0.3–6.2)
ERYTHROCYTE [DISTWIDTH] IN BLOOD BY AUTOMATED COUNT: 15.5 % (ref 12.3–15.4)
GLUCOSE BLDC GLUCOMTR-MCNC: 143 MG/DL (ref 70–130)
GLUCOSE BLDC GLUCOMTR-MCNC: 200 MG/DL (ref 70–130)
GLUCOSE SERPL-MCNC: 113 MG/DL (ref 65–99)
HCT VFR BLD AUTO: 31.1 % (ref 34–46.6)
HGB BLD-MCNC: 9.5 G/DL (ref 12–15.9)
IMM GRANULOCYTES # BLD AUTO: 0.13 10*3/MM3 (ref 0–0.05)
IMM GRANULOCYTES NFR BLD AUTO: 0.9 % (ref 0–0.5)
IVRT: 58 MSEC
LEFT ATRIUM VOLUME INDEX: 35.2 ML/M2
LV EF 2D ECHO EST: 65 %
LYMPHOCYTES # BLD AUTO: 1.42 10*3/MM3 (ref 0.7–3.1)
LYMPHOCYTES NFR BLD AUTO: 10 % (ref 19.6–45.3)
MAGNESIUM SERPL-MCNC: 2.3 MG/DL (ref 1.6–2.4)
MCH RBC QN AUTO: 31.7 PG (ref 26.6–33)
MCHC RBC AUTO-ENTMCNC: 30.5 G/DL (ref 31.5–35.7)
MCV RBC AUTO: 103.7 FL (ref 79–97)
MONOCYTES # BLD AUTO: 0.95 10*3/MM3 (ref 0.1–0.9)
MONOCYTES NFR BLD AUTO: 6.7 % (ref 5–12)
NEUTROPHILS NFR BLD AUTO: 11.56 10*3/MM3 (ref 1.7–7)
NEUTROPHILS NFR BLD AUTO: 81.1 % (ref 42.7–76)
NRBC BLD AUTO-RTO: 0 /100 WBC (ref 0–0.2)
PHOSPHATE SERPL-MCNC: 2.8 MG/DL (ref 2.5–4.5)
PLATELET # BLD AUTO: 204 10*3/MM3 (ref 140–450)
PMV BLD AUTO: 9.8 FL (ref 6–12)
POTASSIUM SERPL-SCNC: 5 MMOL/L (ref 3.5–5.2)
QT INTERVAL: 298 MS
QTC INTERVAL: 380 MS
RBC # BLD AUTO: 3 10*6/MM3 (ref 3.77–5.28)
SODIUM SERPL-SCNC: 140 MMOL/L (ref 136–145)
UFH PPP CHRO-ACNC: 0.41 IU/ML (ref 0.3–0.7)
UFH PPP CHRO-ACNC: 0.44 IU/ML (ref 0.3–0.7)
WBC NRBC COR # BLD: 14.24 10*3/MM3 (ref 3.4–10.8)

## 2023-09-15 PROCEDURE — 99232 SBSQ HOSP IP/OBS MODERATE 35: CPT

## 2023-09-15 PROCEDURE — 85520 HEPARIN ASSAY: CPT

## 2023-09-15 PROCEDURE — 25510000001 IOPAMIDOL PER 1 ML: Performed by: INTERNAL MEDICINE

## 2023-09-15 PROCEDURE — C1725 CATH, TRANSLUMIN NON-LASER: HCPCS | Performed by: INTERNAL MEDICINE

## 2023-09-15 PROCEDURE — 99232 SBSQ HOSP IP/OBS MODERATE 35: CPT | Performed by: INTERNAL MEDICINE

## 2023-09-15 PROCEDURE — C1769 GUIDE WIRE: HCPCS | Performed by: INTERNAL MEDICINE

## 2023-09-15 PROCEDURE — C1753 CATH, INTRAVAS ULTRASOUND: HCPCS | Performed by: INTERNAL MEDICINE

## 2023-09-15 PROCEDURE — 63710000001 INSULIN LISPRO (HUMAN) PER 5 UNITS: Performed by: INTERNAL MEDICINE

## 2023-09-15 PROCEDURE — 99153 MOD SED SAME PHYS/QHP EA: CPT | Performed by: INTERNAL MEDICINE

## 2023-09-15 PROCEDURE — 92928 PRQ TCAT PLMT NTRAC ST 1 LES: CPT | Performed by: INTERNAL MEDICINE

## 2023-09-15 PROCEDURE — 85025 COMPLETE CBC W/AUTO DIFF WBC: CPT | Performed by: NURSE PRACTITIONER

## 2023-09-15 PROCEDURE — 84100 ASSAY OF PHOSPHORUS: CPT | Performed by: NURSE PRACTITIONER

## 2023-09-15 PROCEDURE — 25010000002 FENTANYL CITRATE (PF) 50 MCG/ML SOLUTION: Performed by: INTERNAL MEDICINE

## 2023-09-15 PROCEDURE — C1874 STENT, COATED/COV W/DEL SYS: HCPCS | Performed by: INTERNAL MEDICINE

## 2023-09-15 PROCEDURE — B221Z2Z COMPUTERIZED TOMOGRAPHY (CT SCAN) OF MULTIPLE CORONARY ARTERIES USING INTRAVASCULAR OPTICAL COHERENCE: ICD-10-PCS | Performed by: INTERNAL MEDICINE

## 2023-09-15 PROCEDURE — C1887 CATHETER, GUIDING: HCPCS | Performed by: INTERNAL MEDICINE

## 2023-09-15 PROCEDURE — 85347 COAGULATION TIME ACTIVATED: CPT

## 2023-09-15 PROCEDURE — 92978 ENDOLUMINL IVUS OCT C 1ST: CPT | Performed by: INTERNAL MEDICINE

## 2023-09-15 PROCEDURE — 82948 REAGENT STRIP/BLOOD GLUCOSE: CPT

## 2023-09-15 PROCEDURE — C1894 INTRO/SHEATH, NON-LASER: HCPCS | Performed by: INTERNAL MEDICINE

## 2023-09-15 PROCEDURE — 99152 MOD SED SAME PHYS/QHP 5/>YRS: CPT | Performed by: INTERNAL MEDICINE

## 2023-09-15 PROCEDURE — 25010000002 MORPHINE PER 10 MG: Performed by: INTERNAL MEDICINE

## 2023-09-15 PROCEDURE — 97535 SELF CARE MNGMENT TRAINING: CPT

## 2023-09-15 PROCEDURE — C9600 PERC DRUG-EL COR STENT SING: HCPCS | Performed by: INTERNAL MEDICINE

## 2023-09-15 PROCEDURE — 83735 ASSAY OF MAGNESIUM: CPT | Performed by: NURSE PRACTITIONER

## 2023-09-15 PROCEDURE — 25010000002 MIDAZOLAM PER 1 MG: Performed by: INTERNAL MEDICINE

## 2023-09-15 PROCEDURE — 25010000002 HEPARIN (PORCINE) PER 1000 UNITS: Performed by: INTERNAL MEDICINE

## 2023-09-15 PROCEDURE — 80048 BASIC METABOLIC PNL TOTAL CA: CPT | Performed by: NURSE PRACTITIONER

## 2023-09-15 PROCEDURE — 93005 ELECTROCARDIOGRAM TRACING: CPT | Performed by: INTERNAL MEDICINE

## 2023-09-15 PROCEDURE — 85520 HEPARIN ASSAY: CPT | Performed by: NURSE PRACTITIONER

## 2023-09-15 PROCEDURE — 93010 ELECTROCARDIOGRAM REPORT: CPT | Performed by: INTERNAL MEDICINE

## 2023-09-15 PROCEDURE — 027034Z DILATION OF CORONARY ARTERY, ONE ARTERY WITH DRUG-ELUTING INTRALUMINAL DEVICE, PERCUTANEOUS APPROACH: ICD-10-PCS | Performed by: INTERNAL MEDICINE

## 2023-09-15 PROCEDURE — 97166 OT EVAL MOD COMPLEX 45 MIN: CPT

## 2023-09-15 PROCEDURE — 97162 PT EVAL MOD COMPLEX 30 MIN: CPT

## 2023-09-15 DEVICE — XIENCE SKYPOINT™ EVEROLIMUS ELUTING CORONARY STENT SYSTEM 4.00 MM X 12 MM / RAPID-EXCHANGE
Type: IMPLANTABLE DEVICE | Site: HEART | Status: FUNCTIONAL
Brand: XIENCE SKYPOINT™

## 2023-09-15 RX ORDER — NICARDIPINE HCL-0.9% SOD CHLOR 1 MG/10 ML
SYRINGE (ML) INTRAVENOUS
Status: DISCONTINUED | OUTPATIENT
Start: 2023-09-15 | End: 2023-09-15 | Stop reason: HOSPADM

## 2023-09-15 RX ORDER — MORPHINE SULFATE 2 MG/ML
2 INJECTION, SOLUTION INTRAMUSCULAR; INTRAVENOUS
Status: DISCONTINUED | OUTPATIENT
Start: 2023-09-15 | End: 2023-09-20

## 2023-09-15 RX ORDER — HEPARIN SODIUM 1000 [USP'U]/ML
INJECTION, SOLUTION INTRAVENOUS; SUBCUTANEOUS
Status: DISCONTINUED | OUTPATIENT
Start: 2023-09-15 | End: 2023-09-15 | Stop reason: HOSPADM

## 2023-09-15 RX ORDER — FENTANYL CITRATE 50 UG/ML
INJECTION, SOLUTION INTRAMUSCULAR; INTRAVENOUS
Status: DISCONTINUED | OUTPATIENT
Start: 2023-09-15 | End: 2023-09-15 | Stop reason: HOSPADM

## 2023-09-15 RX ORDER — NITROGLYCERIN 0.4 MG/1
0.4 TABLET SUBLINGUAL
Status: DISCONTINUED | OUTPATIENT
Start: 2023-09-15 | End: 2023-09-20 | Stop reason: HOSPADM

## 2023-09-15 RX ORDER — SODIUM CHLORIDE 9 MG/ML
100 INJECTION, SOLUTION INTRAVENOUS CONTINUOUS
Status: CANCELLED | OUTPATIENT
Start: 2023-09-16

## 2023-09-15 RX ORDER — DEXTROSE MONOHYDRATE 25 G/50ML
25 INJECTION, SOLUTION INTRAVENOUS
Status: DISCONTINUED | OUTPATIENT
Start: 2023-09-15 | End: 2023-09-20 | Stop reason: HOSPADM

## 2023-09-15 RX ORDER — CLOPIDOGREL BISULFATE 75 MG/1
TABLET ORAL
Status: DISCONTINUED | OUTPATIENT
Start: 2023-09-15 | End: 2023-09-15 | Stop reason: HOSPADM

## 2023-09-15 RX ORDER — SODIUM CHLORIDE 9 MG/ML
1 INJECTION, SOLUTION INTRAVENOUS CONTINUOUS
Status: ACTIVE | OUTPATIENT
Start: 2023-09-15 | End: 2023-09-15

## 2023-09-15 RX ORDER — LIDOCAINE HYDROCHLORIDE 10 MG/ML
INJECTION, SOLUTION EPIDURAL; INFILTRATION; INTRACAUDAL; PERINEURAL
Status: DISCONTINUED | OUTPATIENT
Start: 2023-09-15 | End: 2023-09-15 | Stop reason: HOSPADM

## 2023-09-15 RX ORDER — INSULIN LISPRO 100 [IU]/ML
2-7 INJECTION, SOLUTION INTRAVENOUS; SUBCUTANEOUS
Status: DISCONTINUED | OUTPATIENT
Start: 2023-09-15 | End: 2023-09-20 | Stop reason: HOSPADM

## 2023-09-15 RX ORDER — IBUPROFEN 600 MG/1
1 TABLET ORAL
Status: DISCONTINUED | OUTPATIENT
Start: 2023-09-15 | End: 2023-09-20 | Stop reason: HOSPADM

## 2023-09-15 RX ORDER — NICOTINE POLACRILEX 4 MG
15 LOZENGE BUCCAL
Status: DISCONTINUED | OUTPATIENT
Start: 2023-09-15 | End: 2023-09-20 | Stop reason: HOSPADM

## 2023-09-15 RX ORDER — MIDAZOLAM HYDROCHLORIDE 1 MG/ML
INJECTION INTRAMUSCULAR; INTRAVENOUS
Status: DISCONTINUED | OUTPATIENT
Start: 2023-09-15 | End: 2023-09-15 | Stop reason: HOSPADM

## 2023-09-15 RX ORDER — MORPHINE SULFATE 2 MG/ML
INJECTION, SOLUTION INTRAMUSCULAR; INTRAVENOUS
Status: DISCONTINUED | OUTPATIENT
Start: 2023-09-15 | End: 2023-09-15 | Stop reason: HOSPADM

## 2023-09-15 RX ADMIN — SODIUM CHLORIDE 1 ML/KG/HR: 9 INJECTION, SOLUTION INTRAVENOUS at 18:47

## 2023-09-15 RX ADMIN — METOPROLOL TARTRATE 25 MG: 25 TABLET, FILM COATED ORAL at 08:30

## 2023-09-15 RX ADMIN — NITROGLYCERIN 1 INCH: 20 OINTMENT TOPICAL at 18:28

## 2023-09-15 RX ADMIN — AMLODIPINE BESYLATE 5 MG: 5 TABLET ORAL at 08:31

## 2023-09-15 RX ADMIN — Medication 10 ML: at 20:53

## 2023-09-15 RX ADMIN — ALPRAZOLAM 0.25 MG: 0.25 TABLET ORAL at 08:40

## 2023-09-15 RX ADMIN — Medication 10 ML: at 08:34

## 2023-09-15 RX ADMIN — INSULIN LISPRO 3 UNITS: 100 INJECTION, SOLUTION INTRAVENOUS; SUBCUTANEOUS at 20:53

## 2023-09-15 RX ADMIN — PANTOPRAZOLE SODIUM 40 MG: 40 INJECTION, POWDER, LYOPHILIZED, FOR SOLUTION INTRAVENOUS at 06:48

## 2023-09-15 RX ADMIN — NITROGLYCERIN 1 INCH: 20 OINTMENT TOPICAL at 11:22

## 2023-09-15 RX ADMIN — NITROGLYCERIN 1 INCH: 20 OINTMENT TOPICAL at 06:49

## 2023-09-15 RX ADMIN — SENNOSIDES AND DOCUSATE SODIUM 2 TABLET: 8.6; 5 TABLET ORAL at 20:52

## 2023-09-15 RX ADMIN — ASPIRIN 81 MG: 81 TABLET, CHEWABLE ORAL at 08:31

## 2023-09-15 RX ADMIN — ATORVASTATIN CALCIUM 40 MG: 40 TABLET, FILM COATED ORAL at 20:52

## 2023-09-15 RX ADMIN — ALLOPURINOL 100 MG: 100 TABLET ORAL at 08:30

## 2023-09-15 RX ADMIN — MORPHINE SULFATE 2 MG: 2 INJECTION, SOLUTION INTRAMUSCULAR; INTRAVENOUS at 18:38

## 2023-09-15 RX ADMIN — CLOPIDOGREL BISULFATE 75 MG: 75 TABLET ORAL at 09:32

## 2023-09-15 RX ADMIN — METOPROLOL TARTRATE 25 MG: 25 TABLET, FILM COATED ORAL at 20:52

## 2023-09-15 NOTE — PLAN OF CARE
Goal Outcome Evaluation:  Plan of Care Reviewed With: patient        Progress: no change     -VSS on 2-4L NC  -PRN Xanax given x1  -Heparin gtt remains infusing at 13 units/kg/hr dosed per pharmacy   -Rested well with no complaints of CP since midnight          Problem: Adult Inpatient Plan of Care  Goal: Absence of Hospital-Acquired Illness or Injury  Intervention: Identify and Manage Fall Risk  Recent Flowsheet Documentation  Taken 9/15/2023 0600 by Keisha Lomas, YULIA  Safety Promotion/Fall Prevention:   activity supervised   assistive device/personal items within reach   clutter free environment maintained   fall prevention program maintained   lighting adjusted   toileting scheduled   safety round/check completed   room organization consistent  Taken 9/15/2023 0400 by Keisha Lomas, YULIA  Safety Promotion/Fall Prevention:   activity supervised   assistive device/personal items within reach   clutter free environment maintained   fall prevention program maintained   lighting adjusted   toileting scheduled   safety round/check completed   room organization consistent  Taken 9/15/2023 0200 by Keisha Lomas, YULIA  Safety Promotion/Fall Prevention:   activity supervised   assistive device/personal items within reach   clutter free environment maintained   fall prevention program maintained   lighting adjusted   toileting scheduled   safety round/check completed   room organization consistent  Taken 9/15/2023 0000 by Keisha Lomas, YULIA  Safety Promotion/Fall Prevention:   activity supervised   assistive device/personal items within reach   clutter free environment maintained   fall prevention program maintained   lighting adjusted   toileting scheduled   safety round/check completed   room organization consistent  Taken 9/14/2023 2200 by Keisha Lomas, YULIA  Safety Promotion/Fall Prevention:   activity supervised   assistive device/personal items within reach   clutter free environment maintained   fall prevention program maintained    lighting adjusted   toileting scheduled   safety round/check completed   room organization consistent  Taken 9/14/2023 2000 by Keisha Lomas RN  Safety Promotion/Fall Prevention:   activity supervised   assistive device/personal items within reach   clutter free environment maintained   fall prevention program maintained   lighting adjusted   toileting scheduled   safety round/check completed   room organization consistent     Problem: Adult Inpatient Plan of Care  Goal: Absence of Hospital-Acquired Illness or Injury  Intervention: Prevent Skin Injury  Recent Flowsheet Documentation  Taken 9/15/2023 0600 by Keisha Lomas, YULIA  Body Position: position changed independently  Skin Protection:   adhesive use limited   incontinence pads utilized   tubing/devices free from skin contact   transparent dressing maintained   skin-to-skin areas padded   skin-to-device areas padded  Taken 9/15/2023 0400 by Keisha Lomas RN  Body Position: position changed independently  Skin Protection:   adhesive use limited   incontinence pads utilized   tubing/devices free from skin contact   transparent dressing maintained   skin-to-skin areas padded   skin-to-device areas padded  Taken 9/15/2023 0200 by Keisha Lomas RN  Body Position:   position changed independently   turned   left   upper extremity elevated   lower extremity elevated   neutral body alignment   neutral head position  Skin Protection:   adhesive use limited   incontinence pads utilized   tubing/devices free from skin contact   transparent dressing maintained   skin-to-skin areas padded   skin-to-device areas padded  Taken 9/15/2023 0000 by Keisha Lomas RN  Body Position:   position changed independently   turned   right  Skin Protection:   adhesive use limited   incontinence pads utilized   tubing/devices free from skin contact   transparent dressing maintained   skin-to-skin areas padded   skin-to-device areas padded  Taken 9/14/2023 2200 by Keisha Lomas RN  Body Position: position  changed independently  Skin Protection:   adhesive use limited   tubing/devices free from skin contact   transparent dressing maintained   skin-to-skin areas padded   skin-to-device areas padded  Taken 9/14/2023 2000 by Keisha Lomas RN  Body Position: position changed independently  Skin Protection:   adhesive use limited   incontinence pads utilized   tubing/devices free from skin contact   transparent dressing maintained   skin-to-skin areas padded   skin-to-device areas padded     Problem: Adult Inpatient Plan of Care  Goal: Absence of Hospital-Acquired Illness or Injury  Intervention: Prevent Infection  Recent Flowsheet Documentation  Taken 9/15/2023 0600 by Keisha Lomas RN  Infection Prevention:   environmental surveillance performed   rest/sleep promoted   single patient room provided  Taken 9/15/2023 0400 by Keisha Lomas RN  Infection Prevention:   environmental surveillance performed   visitors restricted/screened   single patient room provided   rest/sleep promoted  Taken 9/15/2023 0200 by Keisha Lomas RN  Infection Prevention:   environmental surveillance performed   visitors restricted/screened   single patient room provided   rest/sleep promoted  Taken 9/15/2023 0000 by Keisha Lomas RN  Infection Prevention:   environmental surveillance performed   visitors restricted/screened   single patient room provided   rest/sleep promoted  Taken 9/14/2023 2200 by Keisha Lomas RN  Infection Prevention:   environmental surveillance performed   single patient room provided   rest/sleep promoted   visitors restricted/screened  Taken 9/14/2023 2000 by Keisha Lomas RN  Infection Prevention:   environmental surveillance performed   rest/sleep promoted   single patient room provided

## 2023-09-15 NOTE — PLAN OF CARE
Goal Outcome Evaluation:  Plan of Care Reviewed With: patient           Outcome Evaluation: Pt presents w/ generalized weakness, decreased functional endurance, and balance deficits limiting her ADL independence. Pt would benefit from continued skilled IPOT services to address current functional deficits. Rec SNF at d/c.      Anticipated Discharge Disposition (OT): skilled nursing facility

## 2023-09-15 NOTE — PROGRESS NOTES
I was asked by Dr. Jamison to perform PCI of the ostial left main and possibly SVG-OM due to ongoing recurrent unstable angina for this patient.  I met with the patient and her family members as well as her bedside nurse this morning and discussed the procedure in detail along with all possible risks.  All in agreement to proceed.  Of note the patient was loaded with clopidogrel 300 mg yesterday and is on daily maintenance dose currently.  I reviewed her overnight EKG which shows inferolateral ischemic changes which is markedly changed from her initial EKG.    The risks, benefits, and alternative options of cardiac catheterization were discussed with the patient.  The risks include death, MI, stroke, infection, vascular injury requiring surgical repair and/or blood transfusion, coronary dissection, renal dysfunction/failure, allergic reaction, emergent CABG.  If PCI is needed there is a 1-2% risk of emergent CABG.  The patient is agreeable for cardiac catheterization, possible PCI or CABG. Plan is to proceed with cardiac catheterization and possible PCI.     Irvin Gilliam MD, FAC, [facility]  Interventional Cardiology  09/15/23  11:46 EDT

## 2023-09-15 NOTE — PROGRESS NOTES
Referral received for Phase II Cardiac Rehab.  Staff reviewed chart and patient has qualifying diagnosis for Phase II Cardiac Rehab. Patient ultimately refused Cardiac Rehab.

## 2023-09-15 NOTE — CASE MANAGEMENT/SOCIAL WORK
"Discharge Planning Assessment  [facility]     Patient Name: Betty Rod  MRN: [id number]  Today's Date: 9/15/2023    Admit Date: 9/13/2023    Plan: Home with home health services   Discharge Needs Assessment       Row Name 09/15/23 1414       Living Environment    People in Home alone    Current Living Arrangements home    Primary Care Provided by self    Provides Primary Care For no one    Family Caregiver if Needed child(ronda), adult    Able to Return to Prior Arrangements yes       Transition Planning    Patient/Family Anticipates Transition to home    Transportation Anticipated family or friend will provide       Discharge Needs Assessment    Readmission Within the Last 30 Days no previous admission in last 30 days    Equipment Currently Used at Home cane, straight    Equipment Needed After Discharge oxygen    Discharge Facility/Level of Care Needs home with home health    Current Discharge Risk chronically ill;lives alone                   Discharge Plan       Row Name 09/15/23 1415       Plan    Plan Home with home health services    Patient/Family in Agreement with Plan yes    Plan Comments I met with Ms. Rod and her daughters, Yasmin and Norma, at the bedside. Ms. Rod lives alone in [place]. She is independent with mobility and activities of daily living. She is driven by her daughters (there are 4 total) when leaving the home and is not current with any home or outpatient services. She has a rolling walker and straight cane at home and denies any difficulty affording her medications. Ms. Rod anticipates going home at the time of discharge and her daughters will transport her at that time. Therapy has worked with Ms. Rod and they are recommending rehab but she is not agreeable to rehab and wants to go home with home health services. Ms. Rod does report to me that \"someone\" was trying to arrange home health and oxygen for her but she is unsure whom this was or whom it was being arranged " through. I will try to find out and make these arrangements. Her oxygen (if needed) will need to be arranged on the day of discharge. CM will continue to follow.    Final Discharge Disposition Code 06 - home with home health care                  Continued Care and Services - Admitted Since 9/13/2023    Coordination has not been started for this encounter.       Expected Discharge Date and Time       Expected Discharge Date Expected Discharge Time    Sep 22, 2023            Demographic Summary       Row Name 09/15/23 1413       General Information    General Information Comments Confirmed PCP to be Joseph Esparza and [employer] to be insurer.                   Functional Status       Row Name 09/15/23 1414       Functional Status, IADL    Medications independent    Meal Preparation independent    Housekeeping independent    Laundry independent    Shopping independent       Employment/    Employment Status retired                   Psychosocial    No documentation.                  Abuse/Neglect    No documentation.                  Legal    No documentation.                  Substance Abuse    No documentation.                  Patient Forms    No documentation.                     Rene Bowen, RN

## 2023-09-15 NOTE — PROGRESS NOTES
Intensive Care Follow-up     Hospital:  LOS: 2 days   Ms. Betty Rod, 82 y.o. female is followed for: Glycemic, Electrolyte, Respiratory, and Medical management          Subjective   Interval History:  Chart reviewed. VSS. Patient alert and oriented, conversant and pleasant, sitting up in chair in no acute distress. O2 94% on 3L nasal cannula. NSR/SB. /69. Patient states she is hungry (NPO for heart cath this afternoon) and endorses chest pain similar to what she has been having. Denies shortness of breath, dizziness, nausea/vomiting. No other complaints at this time.      The patient's past medical, surgical and social history were reviewed and updated in Epic as appropriate.     Objective     Infusions:  heparin, 13 Units/kg/hr, Last Rate: 13 Units/kg/hr (09/14/23 2247)  nitroglycerin, 10-50 mcg/min, Last Rate: Stopped (09/14/23 1017)  Pharmacy to Dose Heparin,       Medications:  allopurinol, 100 mg, Oral, Daily  amLODIPine, 5 mg, Oral, Q24H  aspirin, 81 mg, Oral, Daily  atorvastatin, 40 mg, Oral, Nightly  clopidogrel, 75 mg, Oral, Daily  metoprolol tartrate, 25 mg, Oral, Q12H  nitroglycerin, 1 inch, Topical, Q6H  pantoprazole, 40 mg, Intravenous, Q AM  pharmacy consult - [facility], , Does not apply, Daily  senna-docusate sodium, 2 tablet, Oral, BID  sodium chloride, 10 mL, Intravenous, Q12H      I reviewed the patient's medications.    Vital Sign Min/Max for last 24 hours  Temp  Min: 98.3 °F (36.8 °C)  Max: 98.7 °F (37.1 °C)   BP  Min: 112/79  Max: 179/103   Pulse  Min: 60  Max: 100   Resp  Min: 16  Max: 28   SpO2  Min: 90 %  Max: 96 %   Flow (L/min)  Min: 2  Max: 4       Input/Output for last 24 hour shift  09/14 0701 - 09/15 0700  In: 486.6 [P.O.:150; I.V.:336.6]  Out: 500 [Urine:500]      Physical Exam:  GENERAL: Patient sitting up in chair. Conversant. No acute distress.   HEENT: Normocephalic and atraumatic. Trachea midline. PER. EOM WNL.   LUNGS: Chest rise of normal depth and symmetric. Lungs clear to  auscultation bilaterally. No wheezes, rhonchi, or rales.   HEART: S1,S2 detected. Regular rate and rhythm. No rub, murmur, or gallop.   ABDOMEN: Soft, round, nondistended, and nontender. Bowel sounds present.   EXTREMITIES: No clubbing, edema, or cyanosis. Peripheral pulses present. Skin warm and dry.    NEURO/PSYCH: Alert and oriented. Follows commands. Moves all extremities. No focal deficits.      Results from last 7 days   Lab Units 09/15/23  0416 09/13/23  2219   WBC 10*3/mm3 14.24* 16.28*   HEMOGLOBIN g/dL 9.5* 9.2*   PLATELETS 10*3/mm3 204 193     Results from last 7 days   Lab Units 09/15/23  0416 09/13/23  2219   SODIUM mmol/L 140 141   POTASSIUM mmol/L 5.0 4.6   CO2 mmol/L 23.0 24.0   BUN mg/dL 26* 20   CREATININE mg/dL 1.55* 1.65*   MAGNESIUM mg/dL 2.3 1.9   PHOSPHORUS mg/dL 2.8 2.5   GLUCOSE mg/dL 113* 118*     Estimated Creatinine Clearance: 27.2 mL/min (A) (by C-G formula based on SCr of 1.55 mg/dL (H)).    Assessment & Plan   Impression      NSTEMI (non-ST elevated myocardial infarction)    CKD (chronic kidney disease)    Coronary artery disease    Diabetes mellitus    Emphysema of lung    Gout    Hypertension    Atelectasis    Pleural effusion    Acute respiratory failure       Plan        MS. Rod is an 81 yo female with PMH CAD s/p CABG 18 years ago, DVTs, Diabetes, HTN, Gout, CKD and anxiety who presents to the ICU 9/13/23 for potential high-risk PCI.      Presented to [facility] with general malaise, vomiting and chest pain. She was found to be having a STEMI so she was transferred to [facility] on 9/8 for further cardiac evaluation. She was started on IV heparin and antiplatelet therapy. Plan was to perform LHC but on presentation she had an elevated Cr which delayed the heart catheterization. This was ultimately performed on 9/12 and found severe multivessel CAD. Severe ostial left main stenosis.  of the LAD, LCX and RCA. Occluded SVG to diagonal and OM. Patent LIMA to LAD. CXR showed  bilateral opacities possible pneumonia vs. Pulmonary edema. She was started on a Nitroglycerin drip, PO Imdur, IV lasix, and antibiotics. Chest pain persisted while on nitro drip and Imdur so repeat troponin was obtained and found to be 1600. At that time Heparin was restarted. Cardiology felt she would need a high risk PCI evaluation which could not be performed there so she was transferred to [facility] ICU for higher level of care. Patient continued to have chest pain overnight with marked EKG changes. She will undergo PCI per Dr. Gilliam today.    NSTEMI (non-ST elevated myocardial infarction)  Coronary artery disease  Hypertension  Hyperlipidemia  EKG overnight with inferolateral ischemic changes, markedly different from prior  PCI of the left main and possibly SVG-OM today per Dr. Gilliam  Loaded with 300 mg clopidogrel 9/14  ASA/Plavix  Nitroglycerin PRN  Continue lopressor, amlodipine, atorvastatin  Holding home Bumex    CKD (chronic kidney disease)  Creatinine down to 1.55 from 1.65  Will trend; if does not improve, will consult nephrology    Diabetes mellitus  Hgb A1c 6.2 on admission  Start SQ insulin    Acute Respiratory Failure  Atelectasis  Left pleural effusion  Emphysema  Currently on 3L NC; does not wear oxygen at home.  On Bumex at home; will defer to Cardiology for continuation  DuoNebs    Gout  Allopurinol    DVT Prophylaxis: Heparin  GI Prophylaxis: PPI  Dispo: Keep in ICU    Time spent: 35 minutes  Plan of care and goals reviewed with multidisciplinary/antibiotic stewardship team during rounds.   I discussed the patient's findings and my recommendations with patient, family, nursing staff, and primary care team     Prabha Velasquez, MSN, APRN, ACNPC-AG  Pulmonary and Critical Care Medicine  Electronically signed by DANNY Ordaz, 09/15/23, 11:28 AM EDT.

## 2023-09-15 NOTE — PLAN OF CARE
Patient pleasant, alert & oriented for most of the shift. Anxious for cath procedure but complained of little to no chest pain. Worked with PT, walked in the candelario and sat in the recliner for awhile. BM x1, voids adequately in bedside commode and external cath. Patient went to cath lab at 1645 and returned at 1830. Right radial TR band in place - to begin air removal at 2000 per cath lab. Patient returned to unit with complaints of 10/10 chest pain, was anxious, tachypneic and shallow breathing. NC increased to 6L and nitro paste applied. Morphine 2mg given with some effect. By the end of shift change patient was on 5L NC with improvement in breathing and pain and less tachycardic. Post EKG completed. Patient and family updated on plan of care.      Problem: Adult Inpatient Plan of Care  Goal: Plan of Care Review  Outcome: Ongoing, Progressing  Goal: Patient-Specific Goal (Individualized)  Outcome: Ongoing, Progressing  Goal: Absence of Hospital-Acquired Illness or Injury  Outcome: Ongoing, Progressing  Intervention: Identify and Manage Fall Risk  Recent Flowsheet Documentation  Taken 9/15/2023 1838 by Sandra Lynch RN  Safety Promotion/Fall Prevention:   activity supervised   assistive device/personal items within reach   safety round/check completed  Taken 9/15/2023 1600 by Sandra Lynch RN  Safety Promotion/Fall Prevention:   activity supervised   assistive device/personal items within reach   safety round/check completed  Taken 9/15/2023 1400 by Sandra Lynch RN  Safety Promotion/Fall Prevention:   assistive device/personal items within reach   activity supervised   safety round/check completed  Taken 9/15/2023 1200 by Sandra Lynch, RN  Safety Promotion/Fall Prevention:   activity supervised   assistive device/personal items within reach   safety round/check completed  Taken 9/15/2023 1000 by Sandra Lynch, RN  Safety Promotion/Fall Prevention:   activity supervised   assistive device/personal  items within reach   safety round/check completed  Taken 9/15/2023 0800 by Sandra Lynch RN  Safety Promotion/Fall Prevention:   activity supervised   assistive device/personal items within reach   safety round/check completed  Intervention: Prevent Skin Injury  Recent Flowsheet Documentation  Taken 9/15/2023 1838 by Sandra Lynch RN  Body Position:   upper extremity elevated   lower extremity elevated   position changed independently  Skin Protection:   tubing/devices free from skin contact   transparent dressing maintained   skin-to-skin areas padded   skin-to-device areas padded   adhesive use limited  Taken 9/15/2023 1600 by Sandra Lynch RN  Body Position:   upper extremity elevated   lower extremity elevated   position changed independently  Skin Protection:   tubing/devices free from skin contact   transparent dressing maintained   skin-to-skin areas padded   skin-to-device areas padded   adhesive use limited  Taken 9/15/2023 1400 by Sandra Lynch RN  Body Position:   upper extremity elevated   lower extremity elevated   position changed independently  Skin Protection:   tubing/devices free from skin contact   transparent dressing maintained   skin-to-skin areas padded   skin-to-device areas padded   adhesive use limited  Taken 9/15/2023 1200 by Sandra Lynch RN  Body Position:   upper extremity elevated   lower extremity elevated   position changed independently  Skin Protection:   tubing/devices free from skin contact   transparent dressing maintained   skin-to-skin areas padded   skin-to-device areas padded   adhesive use limited  Taken 9/15/2023 1000 by Sandra Lynch RN  Body Position: (up in chair) other (see comments)  Skin Protection:   tubing/devices free from skin contact   transparent dressing maintained   skin-to-skin areas padded   skin-to-device areas padded   adhesive use limited  Taken 9/15/2023 0800 by Sandra Lynch RN  Body Position: position changed  independently  Skin Protection:   tubing/devices free from skin contact   transparent dressing maintained   skin-to-skin areas padded   skin-to-device areas padded   adhesive use limited  Intervention: Prevent and Manage VTE (Venous Thromboembolism) Risk  Recent Flowsheet Documentation  Taken 9/15/2023 1838 by Sandra Lynch RN  Activity Management: activity minimized  VTE Prevention/Management: (see MAR) other (see comments)  Range of Motion: active ROM (range of motion) encouraged  Taken 9/15/2023 1600 by Sandra Lynch RN  Activity Management: activity encouraged  VTE Prevention/Management: (see MAR) other (see comments)  Range of Motion: active ROM (range of motion) encouraged  Taken 9/15/2023 1400 by Sandra Lynch RN  Activity Management: activity encouraged  Taken 9/15/2023 1200 by Sandra Lynch RN  Activity Management: back to bed  VTE Prevention/Management: (see MAR) other (see comments)  Range of Motion: active ROM (range of motion) encouraged  Taken 9/15/2023 1000 by Sandra Lynch RN  Activity Management:   up to bedside commode   up in chair  Range of Motion: active ROM (range of motion) encouraged  Taken 9/15/2023 0800 by Sandra Lynch RN  Activity Management: activity minimized  VTE Prevention/Management: (see MAR) other (see comments)  Range of Motion: active ROM (range of motion) encouraged  Intervention: Prevent Infection  Recent Flowsheet Documentation  Taken 9/15/2023 1838 by Sandra Lynch RN  Infection Prevention: environmental surveillance performed  Taken 9/15/2023 1600 by Sandra Lynch RN  Infection Prevention: environmental surveillance performed  Taken 9/15/2023 1400 by Sandra Lynch RN  Infection Prevention: environmental surveillance performed  Taken 9/15/2023 1200 by Sandra Lynch RN  Infection Prevention: environmental surveillance performed  Taken 9/15/2023 1000 by Sandra Lynch RN  Infection Prevention: environmental surveillance  performed  Taken 9/15/2023 0800 by Sandra Lynch RN  Infection Prevention:   environmental surveillance performed   single patient room provided   hand hygiene promoted   equipment surfaces disinfected  Goal: Optimal Comfort and Wellbeing  Outcome: Ongoing, Progressing  Intervention: Monitor Pain and Promote Comfort  Recent Flowsheet Documentation  Taken 9/15/2023 1838 by Sandra Lynch RN  Pain Management Interventions:   see MAR   relaxation techniques promoted   massage provided  Taken 9/15/2023 1000 by Sandra Lynch RN  Pain Management Interventions: relaxation techniques promoted  Taken 9/15/2023 0800 by Sandra Lynch RN  Pain Management Interventions:   relaxation techniques promoted   see MAR  Intervention: Provide Person-Centered Care  Recent Flowsheet Documentation  Taken 9/15/2023 1838 by Sandra Lynch RN  Trust Relationship/Rapport:   care explained   choices provided   emotional support provided   questions answered   empathic listening provided   questions encouraged   reassurance provided   thoughts/feelings acknowledged  Taken 9/15/2023 1600 by Sandra Lynch RN  Trust Relationship/Rapport:   care explained   choices provided   emotional support provided   questions answered   empathic listening provided   questions encouraged   reassurance provided   thoughts/feelings acknowledged  Taken 9/15/2023 1200 by Sandra Lynch RN  Trust Relationship/Rapport:   care explained   choices provided   emotional support provided   empathic listening provided   questions answered   questions encouraged   reassurance provided   thoughts/feelings acknowledged  Taken 9/15/2023 0800 by Sandra Lynch RN  Trust Relationship/Rapport:   care explained   choices provided   emotional support provided   empathic listening provided   questions answered   questions encouraged   reassurance provided   thoughts/feelings acknowledged  Goal: Readiness for Transition of Care  Outcome: Ongoing,  Progressing     Problem: Skin Injury Risk Increased  Goal: Skin Health and Integrity  Outcome: Ongoing, Progressing  Intervention: Optimize Skin Protection  Recent Flowsheet Documentation  Taken 9/15/2023 1838 by Sandra Lynch RN  Pressure Reduction Techniques:   heels elevated off bed   frequent weight shift encouraged   positioned off wounds   pressure points protected  Head of Bed (HOB) Positioning: HOB at 30-45 degrees  Pressure Reduction Devices:   specialty bed utilized   pressure-redistributing mattress utilized   positioning supports utilized  Skin Protection:   tubing/devices free from skin contact   transparent dressing maintained   skin-to-skin areas padded   skin-to-device areas padded   adhesive use limited  Taken 9/15/2023 1600 by Sandra Lynch RN  Pressure Reduction Techniques:   pressure points protected   positioned off wounds   heels elevated off bed   frequent weight shift encouraged  Head of Bed (HOB) Positioning: HOB at 30-45 degrees  Pressure Reduction Devices:   specialty bed utilized   pressure-redistributing mattress utilized   positioning supports utilized  Skin Protection:   tubing/devices free from skin contact   transparent dressing maintained   skin-to-skin areas padded   skin-to-device areas padded   adhesive use limited  Taken 9/15/2023 1400 by Sandra Lynch RN  Pressure Reduction Techniques:   pressure points protected   positioned off wounds   heels elevated off bed   frequent weight shift encouraged  Head of Bed (HOB) Positioning: HOB at 30-45 degrees  Pressure Reduction Devices:   specialty bed utilized   positioning supports utilized   pressure-redistributing mattress utilized  Skin Protection:   tubing/devices free from skin contact   transparent dressing maintained   skin-to-skin areas padded   skin-to-device areas padded   adhesive use limited  Taken 9/15/2023 1200 by Sandra Lynch RN  Pressure Reduction Techniques:   heels elevated off bed   positioned off  wounds   pressure points protected   rest period provided between sit times  Head of Bed (HOB) Positioning: HOB at 30-45 degrees  Pressure Reduction Devices:   specialty bed utilized   pressure-redistributing mattress utilized   positioning supports utilized  Skin Protection:   tubing/devices free from skin contact   transparent dressing maintained   skin-to-skin areas padded   skin-to-device areas padded   adhesive use limited  Taken 9/15/2023 1000 by Sandra Lynch, RN  Pressure Reduction Techniques:   pressure points protected   positioned off wounds   heels elevated off bed   frequent weight shift encouraged  Head of Bed (HOB) Positioning: HOB at 30-45 degrees  Pressure Reduction Devices:   specialty bed utilized   pressure-redistributing mattress utilized   positioning supports utilized  Skin Protection:   tubing/devices free from skin contact   transparent dressing maintained   skin-to-skin areas padded   skin-to-device areas padded   adhesive use limited  Taken 9/15/2023 0800 by Sandra Lynch, RN  Pressure Reduction Techniques:   rest period provided between sit times   pressure points protected   positioned off wounds   frequent weight shift encouraged   heels elevated off bed  Head of Bed (HOB) Positioning: HOB at 30-45 degrees  Pressure Reduction Devices:   specialty bed utilized   pressure-redistributing mattress utilized   positioning supports utilized  Skin Protection:   tubing/devices free from skin contact   transparent dressing maintained   skin-to-skin areas padded   skin-to-device areas padded   adhesive use limited     Problem: Fall Injury Risk  Goal: Absence of Fall and Fall-Related Injury  Outcome: Ongoing, Progressing  Intervention: Identify and Manage Contributors  Recent Flowsheet Documentation  Taken 9/15/2023 1838 by Sandra Lynch, RN  Medication Review/Management: medications reviewed  Self-Care Promotion:   independence encouraged   BADL personal objects within reach   BADL  personal routines maintained  Taken 9/15/2023 1600 by Sandra Lynhc RN  Self-Care Promotion:   independence encouraged   BADL personal objects within reach   BADL personal routines maintained  Taken 9/15/2023 1400 by Sandra Lynch RN  Medication Review/Management: medications reviewed  Self-Care Promotion:   independence encouraged   BADL personal objects within reach   BADL personal routines maintained  Taken 9/15/2023 1200 by Sandra Lynch RN  Medication Review/Management: medications reviewed  Self-Care Promotion:   independence encouraged   BADL personal objects within reach   BADL personal routines maintained  Taken 9/15/2023 1000 by Sandar Lynch RN  Medication Review/Management: medications reviewed  Self-Care Promotion:   independence encouraged   BADL personal objects within reach   BADL personal routines maintained  Taken 9/15/2023 0800 by Sandra Lynch RN  Medication Review/Management: medications reviewed  Self-Care Promotion:   independence encouraged   BADL personal objects within reach   BADL personal routines maintained  Intervention: Promote Injury-Free Environment  Recent Flowsheet Documentation  Taken 9/15/2023 1838 by Sandra Lynch RN  Safety Promotion/Fall Prevention:   activity supervised   assistive device/personal items within reach   safety round/check completed  Taken 9/15/2023 1600 by Sandra Lynch RN  Safety Promotion/Fall Prevention:   activity supervised   assistive device/personal items within reach   safety round/check completed  Taken 9/15/2023 1400 by Sandra Lynch RN  Safety Promotion/Fall Prevention:   assistive device/personal items within reach   activity supervised   safety round/check completed  Taken 9/15/2023 1200 by Sandra Lynch RN  Safety Promotion/Fall Prevention:   activity supervised   assistive device/personal items within reach   safety round/check completed  Taken 9/15/2023 1000 by Sandra Lynch RN  Safety Promotion/Fall  Prevention:   activity supervised   assistive device/personal items within reach   safety round/check completed  Taken 9/15/2023 0800 by Sandra Lynch RN  Safety Promotion/Fall Prevention:   activity supervised   assistive device/personal items within reach   safety round/check completed

## 2023-09-15 NOTE — THERAPY EVALUATION
Patient Name: Betty Rod  : 1940    MRN: [id number]                              Today's Date: 9/15/2023       Admit Date: 2023    Visit Dx:     ICD-10-CM ICD-9-CM   1. NSTEMI (non-ST elevated myocardial infarction)  I21.4 410.70     Patient Active Problem List   Diagnosis    NSTEMI (non-ST elevated myocardial infarction)    CKD (chronic kidney disease)    Coronary artery disease    Diabetes mellitus    Emphysema of lung    Gout    Hypertension    Atelectasis    Pleural effusion    Acute respiratory failure     Past Medical History:   Diagnosis Date    Anxiety     CKD (chronic kidney disease)     Coronary artery disease     Diabetes mellitus     Emphysema of lung     Gout     Hypertension     Osteoarthritis      Past Surgical History:   Procedure Laterality Date    CARDIAC SURGERY      CORONARY ARTERY BYPASS GRAFT      GALLBLADDER SURGERY        General Information       Row Name 09/15/23 1016          OT Time and Intention    Document Type evaluation  -     Mode of Treatment occupational therapy  -       Row Name 09/15/23 1016          General Information    Patient Profile Reviewed yes  -     Prior Level of Function independent:;bed mobility;transfer;all household mobility;ADL's;dependent:;driving  Pt uses rollator for mobility, walks short household distances only. Pt reports increased difficulty w/ household tasks at baseline, daughters assist PRN.  -     Existing Precautions/Restrictions fall;oxygen therapy device and L/min  -     Barriers to Rehab medically complex;previous functional deficit  -       Row Name 09/15/23 1016          Living Environment    People in Home alone  -       Row Name 09/15/23 1016          Home Main Entrance    Number of Stairs, Main Entrance four;other (see comments)  ramp (pt typically does not use)  -       Row Name 09/15/23 1016          Stairs Within Home, Primary    Stairs, Within Home, Primary Pt lives in 2-level home but stays on main floor   -     Number of Stairs, Within Home, Primary other (see comments)  -       Row Name 09/15/23 1016          Cognition    Orientation Status (Cognition) oriented x 3  -[facility] Name 09/15/23 1016          Safety Issues, Functional Mobility    Safety Issues Affecting Function (Mobility) awareness of need for assistance;insight into deficits/self-awareness;safety precaution awareness;safety precautions follow-through/compliance;sequencing abilities  -     Impairments Affecting Function (Mobility) balance;endurance/activity tolerance;strength;shortness of breath  -               User Key  (r) = Recorded By, (t) = Taken By, (c) = Cosigned By      Initials Name Provider Type     Maria Elena Mayo OT Occupational Therapist                     Mobility/ADL's       Row Name 09/15/23 1018          Bed Mobility    Bed Mobility supine-sit  -     Supine-Sit [place] (Bed Mobility) contact guard;verbal cues  -     Assistive Device (Bed Mobility) bed rails;head of bed elevated  -     Comment, (Bed Mobility) extended time and effort required to complete  -[facility] Name 09/15/23 1018          Transfers    Transfers sit-stand transfer;toilet transfer  -[facility] 09/15/23 1018          Sit-Stand Transfer    Sit-Stand [place] (Transfers) verbal cues;minimum assist (75% patient effort);1 person assist;1 person to manage equipment  -     Assistive Device (Sit-Stand Transfers) walker, front-wheeled  -[facility] Name 09/15/23 1018          Toilet Transfer    Type (Toilet Transfer) stand-sit  -     [place] Level (Toilet Transfer) minimum assist (75% patient effort);1 person assist;1 person to manage equipment;verbal cues  -     Assistive Device (Toilet Transfer) commode;grab bars/safety frame;walker, front-wheeled  -[facility] Name 09/15/23 1018          Functional Mobility    Functional Mobility- Ind. Level minimum assist (75% patient effort);1 person + 1 person to manage  equipment;verbal cues required  -     Functional Mobility- Device walker, front-wheeled  -     Functional Mobility-Distance (Feet) --  to bathroom  -     Functional Mobility- Safety Issues balance decreased during turns;sequencing ability decreased;step length decreased;supplemental O2  -     Functional Mobility- Comment Pt req consistent VC's and manual assist to keep RW close.  -[facility] Name 09/15/23 1018          Activities of Daily Living    BADL Assessment/Intervention lower body dressing;toileting;grooming;upper body dressing  -[facility] 09/15/23 1018          Lower Body Dressing Assessment/Training    [place] Level (Lower Body Dressing) don;socks;set up  -     Position (Lower Body Dressing) long sitting  -[facility] Name 09/15/23 1018          Toileting Assessment/Training    [place] Level (Toileting) adjust/manage clothing;perform perineal hygiene;contact guard assist  -     Assistive Devices (Toileting) commode;grab bar/safety frame  -     Position (Toileting) supported sitting  -[facility]       Row Name 09/15/23 1018          Grooming Assessment/Training    [place] Level (Grooming) wash face, hands;set up  washing hands only with wipe  -     Position (Grooming) supported sitting  -[facility]       Row Name 09/15/23 1018          Upper Body Dressing Assessment/Training    [place] Level (Upper Body Dressing) don;pajama/robe;moderate assist (50% patient effort);verbal cues  -     Position (Upper Body Dressing) edge of bed sitting  -               User Key  (r) = Recorded By, (t) = Taken By, (c) = Cosigned By      Initials Name Provider Type     Maria Elena Mayo OT Occupational Therapist                   Obj/Interventions       [facility] Name 09/15/23 1020          Sensory Assessment (Somatosensory)    Sensory Assessment (Somatosensory) UE sensation intact  -[facility] 09/15/23 1020          Vision Assessment/Intervention    Visual Impairment/Limitations WFL  -        [facility] Name 09/15/23 1020          Range of Motion Comprehensive    General Range of Motion bilateral upper extremity ROM WFL  -[facility] Name 09/15/23 1020          Strength Comprehensive (MMT)    General Manual Muscle Testing (MMT) Assessment upper extremity strength deficits identified  -     Comment, General Manual Muscle Testing (MMT) Assessment BUE grossly 4/5  -[facility] Name 09/15/23 1020          Balance    Balance Assessment sitting static balance;sitting dynamic balance;standing static balance;sit to stand dynamic balance;standing dynamic balance  -     Static Sitting Balance standby assist  -     Dynamic Sitting Balance contact guard  -     Position, Sitting Balance unsupported;sitting edge of bed;other (see comments)  commode  -     Sit to Stand Dynamic Balance minimal assist;1-person assist;1 person to manage equipment;verbal cues  -     Static Standing Balance minimal assist;1-person assist;1 person to manage equipment;verbal cues  -     Dynamic Standing Balance minimal assist;1-person assist;1 person to manage equipment;verbal cues  -     Position/Device Used, Standing Balance supported;walker, front-wheeled  -     Balance Interventions sitting;sit to stand;occupation based/functional task  -               User Key  (r) = Recorded By, (t) = Taken By, (c) = Cosigned By      Initials Name Provider Type     Maria Elena Mayo OT Occupational Therapist                   Goals/Plan       [facility] Name 09/15/23 1024          Transfer Goal 1 (OT)    Activity/Assistive Device (Transfer Goal 1, OT) sit-to-stand/stand-to-sit;bed-to-chair/chair-to-bed;toilet;walker, rolling  -     [place] Level/Cues Needed (Transfer Goal 1, OT) contact guard required  -     Time Frame (Transfer Goal 1, OT) long term goal (LTG);10 days  -     Progress/Outcome (Transfer Goal 1, OT) goal ongoing  -[facility] Name 09/15/23 1024          Grooming Goal 1 (OT)    Activity/Device (Grooming Goal 1,  OT) hair care;oral care;wash face, hands  standing sinkside  -     [place] (Grooming Goal 1, OT) standby assist  -     Time Frame (Grooming Goal 1, OT) long term goal (LTG);10 days  -     Progress/Outcome (Grooming Goal 1, OT) goal ongoing  -[facility] Name 09/15/23 1024          Strength Goal 1 (OT)    Strength Goal 1 (OT) Pt will tolerate 10 reps of BUE TE to support ADL independence.  -     Time Frame (Strength Goal 1, OT) long term goal (LTG);10 days  -     Progress/Outcome (Strength Goal 1, OT) goal ongoing  -[facility] Name 09/15/23 1024          Therapy Assessment/Plan (OT)    Planned Therapy Interventions (OT) activity tolerance training;adaptive equipment training;BADL retraining;functional balance retraining;IADL retraining;occupation/activity based interventions;patient/caregiver education/training;ROM/therapeutic exercise;strengthening exercise;transfer/mobility retraining  -               User Key  (r) = Recorded By, (t) = Taken By, (c) = Cosigned By      Initials Name Provider Type     Maria Elena Mayo, OT Occupational Therapist                   Clinical Impression       [facility] Name 09/15/23 1021          Pain Assessment    Pain Intervention(s) Repositioned;Ambulation/increased activity  -     Additional Documentation Pain Scale: FACES Pre/Post-Treatment (Group)  -[facility]       Row Name 09/15/23 1021          Pain Scale: FACES Pre/Post-Treatment    Pain: FACES Scale, Pretreatment 2-->hurts little bit  -     Posttreatment Pain Rating 2-->hurts little bit  -     Pain Location generalized  -     Pain Location - chest  -[facility] 09/15/23 1021          Plan of Care Review    Plan of Care Reviewed With patient  -     Outcome Evaluation Pt presents w/ generalized weakness, decreased functional endurance, and balance deficits limiting her ADL independence. Pt would benefit from continued skilled IPOT services to address current functional deficits. Rec SNF at d/c.  -        Row Name 09/15/23 1021          Therapy Assessment/Plan (OT)    Rehab Potential (OT) good, to achieve stated therapy goals  -     Criteria for Skilled Therapeutic Interventions Met (OT) yes;skilled treatment is necessary  -     Therapy Frequency (OT) daily  -       Row Name 09/15/23 1021          Therapy Plan Review/Discharge Plan (OT)    Anticipated Discharge Disposition (OT) [facility] nursing facility  -       Row Name 09/15/23 1021          Vital Signs    Pre Systolic BP Rehab 139  -[facility]     Pre Treatment Diastolic BP 69  -[facility]     Post Systolic BP Rehab 156  -[facility]     Post Treatment Diastolic BP 69  -[facility]     Pretreatment Heart Rate (beats/min) 68  -[facility]     Posttreatment Heart Rate (beats/min) 66  -[facility]     Pre SpO2 (%) 95  -[facility]     O2 Delivery Pre Treatment nasal cannula  -     O2 Delivery Intra Treatment nasal cannula  -     Post SpO2 (%) 94  -MC     O2 Delivery Post Treatment nasal cannula  -MC     Pre Patient Position Supine  -     Intra Patient Position Standing  -     Post Patient Position Sitting  -[facility] 09/15/23 1021          Positioning and Restraints    Pre-Treatment Position in bed  -     Post Treatment Position bathroom  -     Bathroom sitting;with PT  -               User Key  (r) = Recorded By, (t) = Taken By, (c) = Cosigned By      Initials Name Provider Type     Maria Elena Mayo, MARICARMEN Occupational Therapist                   Outcome Measures       Row Name 09/15/23 1025          How much help from another is currently needed...    Putting on and taking off regular lower body clothing? 3  -MC     Bathing (including washing, rinsing, and drying) 3  -MC     Toileting (which includes using toilet bed pan or urinal) 3  -MC     Putting on and taking off regular upper body clothing 3  -MC     Taking care of personal grooming (such as brushing teeth) 3  -MC     Eating meals 3  -MC     AM-PAC 6 Clicks Score (OT) 18  -MC       Row Name 09/15/23 1025          Functional Assessment     Outcome Measure Options AM-PAC 6 Clicks Daily Activity (OT)  -               User Key  (r) = Recorded By, (t) = Taken By, (c) = Cosigned By      Initials Name Provider Type     Maria Elena Mayo OT Occupational Therapist                    Occupational Therapy Education       Title: PT OT SLP Therapies (In Progress)       Topic: Occupational Therapy (In Progress)       Point: ADL training (In Progress)       Description:   Instruct learner(s) on proper safety adaptation and remediation techniques during self care or transfers.   Instruct in proper use of assistive devices.                  Learning Progress Summary             Patient Acceptance, E, NR by  at 9/15/2023 1025                         Point: Home exercise program (Not Started)       Description:   Instruct learner(s) on appropriate technique for monitoring, assisting and/or progressing therapeutic exercises/activities.                  Learner Progress:  Not documented in this visit.              Point: Precautions (In Progress)       Description:   Instruct learner(s) on prescribed precautions during self-care and functional transfers.                  Learning Progress Summary             Patient Acceptance, E, NR by  at 9/15/2023 1025                         Point: Body mechanics (In Progress)       Description:   Instruct learner(s) on proper positioning and spine alignment during self-care, functional mobility activities and/or exercises.                  Learning Progress Summary             Patient Acceptance, E, NR by  at 9/15/2023 1025                                         User Key       Initials Effective Dates Name Provider Type Discipline     10/14/22 -  Maria Elena Mayo OT Occupational Therapist OT                  OT Recommendation and Plan  Planned Therapy Interventions (OT): activity tolerance training, adaptive equipment training, BADL retraining, functional balance retraining, IADL retraining, occupation/activity  based interventions, patient/caregiver education/training, ROM/therapeutic exercise, strengthening exercise, transfer/mobility retraining  Therapy Frequency (OT): daily  Plan of Care Review  Plan of Care Reviewed With: patient  Outcome Evaluation: Pt presents w/ generalized weakness, decreased functional endurance, and balance deficits limiting her ADL independence. Pt would benefit from continued skilled IPOT services to address current functional deficits. Rec SNF at d/c.     Time Calculation:   Evaluation Complexity (OT)  Review Occupational Profile/Medical/Therapy History Complexity: expanded/moderate complexity  Assessment, Occupational Performance/Identification of Deficit Complexity: 3-5 performance deficits  Clinical Decision Making Complexity (OT): detailed assessment/moderate complexity  Overall Complexity of Evaluation (OT): moderate complexity     Time Calculation- OT       Row Name 09/15/23 1026             Time Calculation- OT    OT Start Time 0929  -      OT Received On 09/15/23  -      OT Goal Re-Cert Due Date 09/25/23  -         Timed Charges    40219 - OT Therapeutic Activity Minutes 6  -MC      35873 - OT Self Care/Mgmt Minutes 9  -MC         Untimed Charges    OT Eval/Re-eval Minutes 31  -MC         Total Minutes    Timed Charges Total Minutes 15  -MC      Untimed Charges Total Minutes 31  -MC       Total Minutes 46  -MC                User Key  (r) = Recorded By, (t) = Taken By, (c) = Cosigned By      Initials Name Provider Type     Maria Elena Mayo OT Occupational Therapist                  Therapy Charges for Today       Code Description Service Date Service Provider Modifiers Qty    [id number]  OT SELF CARE/MGMT/TRAIN EA 15 MIN 9/15/2023 Maria Elena Mayo OT GO 1    [id number]  OT EVAL MOD COMPLEXITY 3 9/15/2023 Maria Elena Mayo OT GO 1                 Maria Elena Mayo OT  9/15/2023

## 2023-09-15 NOTE — PLAN OF CARE
Goal Outcome Evaluation:  Plan of Care Reviewed With: patient        Progress: improving  Outcome Evaluation: Pt limited by decreased functional endurance, balance deficit, and generalized weakness compared to baseline. Pt ambulated 60ft with RWx and min A. Rec continued skilled PT to increase indep with mobility. d/c rec for SNF.      Anticipated Discharge Disposition (PT): skilled nursing facility

## 2023-09-15 NOTE — THERAPY EVALUATION
Patient Name: Betty Rod  : 1940    MRN: [id number]                              Today's Date: 9/15/2023       Admit Date: 2023    Visit Dx:     ICD-10-CM ICD-9-CM   1. NSTEMI (non-ST elevated myocardial infarction)  I21.4 410.70     Patient Active Problem List   Diagnosis    NSTEMI (non-ST elevated myocardial infarction)    CKD (chronic kidney disease)    Coronary artery disease    Diabetes mellitus    Emphysema of lung    Gout    Hypertension    Atelectasis    Pleural effusion    Acute respiratory failure     Past Medical History:   Diagnosis Date    Anxiety     CKD (chronic kidney disease)     Coronary artery disease     Diabetes mellitus     Emphysema of lung     Gout     Hypertension     Osteoarthritis      Past Surgical History:   Procedure Laterality Date    CARDIAC SURGERY      CORONARY ARTERY BYPASS GRAFT      GALLBLADDER SURGERY        General Information       Row Name 09/15/23 1157          Physical Therapy Time and Intention    Document Type evaluation  -AY     Mode of Treatment physical therapy  -AY       Row Name 09/15/23 1157          General Information    Patient Profile Reviewed yes  -AY     Prior Level of Function independent:;all household mobility;gait;transfer;ADL's;using stairs  Pt uses rollator for mobility, walks short household distances only. Pt reports increased difficulty w/ household tasks at baseline, daughters assist PRN.  -AY     Existing Precautions/Restrictions fall;oxygen therapy device and L/min;cardiac  -AY     Barriers to Rehab medically complex;previous functional deficit  -AY       Row Name 09/15/23 1157          Living Environment    People in Home alone  -AY       Row Name 09/15/23 1157          Home Main Entrance    Number of Stairs, Main Entrance other (see comments)  ramp  -AY       Row Name 09/15/23 1157          Stairs Within Home, Primary    Stairs, Within Home, Primary can stay on main level  -AY       Row Name 09/15/23 1157          Cognition     Orientation Status (Cognition) oriented x 3  -AY       Row Name 09/15/23 1157          Safety Issues, Functional Mobility    Safety Issues Affecting Function (Mobility) insight into deficits/self-awareness;sequencing abilities  -AY     Impairments Affecting Function (Mobility) balance;endurance/activity tolerance;strength;shortness of breath  -AY               User Key  (r) = Recorded By, (t) = Taken By, (c) = Cosigned By      Initials Name Provider Type    AY Yaquelin Person PT Physical Therapist                   Mobility       Row Name 09/15/23 1158          Sit-Stand Transfer    Sit-Stand [place] (Transfers) minimum assist (75% patient effort);1 person assist;verbal cues  -AY     Assistive Device (Sit-Stand Transfers) walker, front-wheeled  -AY       Row Name 09/15/23 1158          Gait/Stairs (Locomotion)    [place] Level (Gait) minimum assist (75% patient effort);1 person assist;verbal cues;nonverbal cues (demo/gesture)  -AY     Assistive Device (Gait) walker, front-wheeled  -AY     Distance in Feet (Gait) 60  -AY     Deviations/Abnormal Patterns (Gait) da decreased;festinating/shuffling;gait speed decreased;bilateral deviations;stride length decreased;weight shifting decreased;base of support, narrow  -AY     Bilateral Gait Deviations heel strike decreased;forward flexed posture  -AY     Comment, (Gait/Stairs) cueing for posture, walker positioning, and increased stride length. mnaual assist required for walker positioning/management. pt refused to attempt further gait d/t fatigue.  -AY               User Key  (r) = Recorded By, (t) = Taken By, (c) = Cosigned By      Initials Name Provider Type    Yaquelin Crews PT Physical Therapist                   Obj/Interventions       Row Name 09/15/23 1159          Range of Motion Comprehensive    General Range of Motion bilateral lower extremity ROM WFL  -AY       Row Name 09/15/23 1159          Strength Comprehensive (MMT)    General Manual Muscle  Testing (MMT) Assessment lower extremity strength deficits identified  -AY     Comment, General Manual Muscle Testing (MMT) Assessment RLE grossly 3-/5. LLE grossly 3+/5.  -AY       Row Name 09/15/23 1159          Balance    Balance Assessment sitting static balance;sitting dynamic balance;sit to stand dynamic balance;standing dynamic balance;standing static balance  -AY     Static Sitting Balance standby assist  -AY     Dynamic Sitting Balance contact guard  -AY     Position, Sitting Balance unsupported;sitting in chair  -AY     Sit to Stand Dynamic Balance minimal assist  -AY     Static Standing Balance minimal assist  -AY     Dynamic Standing Balance minimal assist  -AY     Position/Device Used, Standing Balance supported;walker, rolling  -AY       Row Name 09/15/23 1159          Sensory Assessment (Somatosensory)    Sensory Assessment (Somatosensory) LE sensation intact  -AY               User Key  (r) = Recorded By, (t) = Taken By, (c) = Cosigned By      Initials Name Provider Type    Yaquelin Crews, PT Physical Therapist                   Goals/Plan       Row Name 09/15/23 1202          Bed Mobility Goal 1 (PT)    Activity/Assistive Device (Bed Mobility Goal 1, PT) sit to supine/supine to sit  -AY     [place] Level/Cues Needed (Bed Mobility Goal 1, PT) supervision required  -AY     Time Frame (Bed Mobility Goal 1, PT) long term goal (LTG);10 days  -AY     Progress/Outcomes (Bed Mobility Goal 1, PT) goal ongoing  -AY       Row Name 09/15/23 1202          Transfer Goal 1 (PT)    Activity/Assistive Device (Transfer Goal 1, PT) sit-to-stand/stand-to-sit;bed-to-chair/chair-to-bed;walker, rolling  -AY     [place] Level/Cues Needed (Transfer Goal 1, PT) supervision required  -AY     Time Frame (Transfer Goal 1, PT) long term goal (LTG);10 days  -AY     Progress/Outcome (Transfer Goal 1, PT) goal ongoing  -AY       Row Name 09/15/23 1202          Gait Training Goal 1 (PT)    Activity/Assistive Device (Gait  Training Goal 1, PT) gait (walking locomotion);assistive device use;walker, rolling  -AY     [place] Level (Gait Training Goal 1, PT) supervision required  -AY     Distance (Gait Training Goal 1, PT) 150  -AY     Time Frame (Gait Training Goal 1, PT) long term goal (LTG);10 days  -AY     Progress/Outcome (Gait Training Goal 1, PT) goal ongoing  -AY       Row Name 09/15/23 1202          Therapy Assessment/Plan (PT)    Planned Therapy Interventions (PT) balance training;bed mobility training;home exercise program;gait training;postural re-education;transfer training;patient/family education;neuromuscular re-education;strengthening  -AY               User Key  (r) = Recorded By, (t) = Taken By, (c) = Cosigned By      Initials Name Provider Type    AY Yaquelin Person, PT Physical Therapist                   Clinical Impression       Row Name 09/15/23 1200          Pain    Pretreatment Pain Rating 0/10 - no pain  -AY     Posttreatment Pain Rating 0/10 - no pain  -AY     Pain Intervention(s) Ambulation/increased activity;Repositioned  -AY     Additional Documentation Pain Scale: Numbers Pre/Post-Treatment (Group)  -AY       Row Name 09/15/23 1200          Plan of Care Review    Plan of Care Reviewed With patient  -AY     Progress improving  -AY     Outcome Evaluation Pt limited by decreased functional endurance, balance deficit, and generalized weakness compared to baseline. Pt ambulated 60ft with RWx and min A. Rec continued skilled PT to increase indep with mobility. d/c rec for SNF.  -AY       Row Name 09/15/23 1200          Therapy Assessment/Plan (PT)    Rehab Potential (PT) fair, will monitor progress closely  -AY     Criteria for Skilled Interventions Met (PT) yes;meets criteria;skilled treatment is necessary  -AY     Therapy Frequency (PT) daily  -AY       Row Name 09/15/23 1200          Vital Signs    Pre Systolic BP Rehab 131  -AY     Pre Treatment Diastolic BP 80  -AY     Post Systolic BP Rehab 139  -AY      Post Treatment Diastolic BP 80  -AY     Pretreatment Heart Rate (beats/min) 66  -AY     Posttreatment Heart Rate (beats/min) 68  -AY     Pre SpO2 (%) 95  -AY     O2 Delivery Pre Treatment nasal cannula  -AY     O2 Delivery Intra Treatment nasal cannula  -AY     Post SpO2 (%) 94  -AY     O2 Delivery Post Treatment nasal cannula  -AY     Pre Patient Position Sitting  -AY     Intra Patient Position Standing  -AY     Post Patient Position Sitting  -AY       Row Name 09/15/23 1200          Positioning and Restraints    Pre-Treatment Position bathroom  -AY     Post Treatment Position chair  -AY     In Chair notified nsg;reclined;sitting;call light within reach;encouraged to call for assist;exit alarm on;legs elevated;LUE elevated;RUE elevated;waffle cushion  -AY               User Key  (r) = Recorded By, (t) = Taken By, (c) = Cosigned By      Initials Name Provider Type    Yaquelin Crews PT Physical Therapist                   Outcome Measures       Row Name 09/15/23 1202          How much help from another person do you currently need...    Turning from your back to your side while in flat bed without using bedrails? 3  -AY     Moving from lying on back to sitting on the side of a flat bed without bedrails? 3  -AY     Moving to and from a bed to a chair (including a wheelchair)? 3  -AY     Standing up from a chair using your arms (e.g., wheelchair, bedside chair)? 3  -AY     Climbing 3-5 steps with a railing? 2  -AY     To walk in hospital room? 3  -AY     AM-PAC 6 Clicks Score (PT) 17  -AY     Highest level of mobility 5 --> Static standing  -AY       Row Name 09/15/23 1202 09/15/23 1025       Functional Assessment    Outcome Measure Options AM-PAC 6 Clicks Basic Mobility (PT)  -AY AM-PAC 6 Clicks Daily Activity (OT)  -              User Key  (r) = Recorded By, (t) = Taken By, (c) = Cosigned By      Initials Name Provider Type    Yaquelin Crews, BOOM Physical Therapist    Maria Elena Dorman, MARICARMEN Occupational  Therapist                                 Physical Therapy Education       Title: PT OT SLP Therapies (In Progress)       Topic: Physical Therapy (In Progress)       Point: Mobility training (In Progress)       Learning Progress Summary             Patient Acceptance, E,TB, NR by AY at 9/15/2023 1202                         Point: Home exercise program (Not Started)       Learner Progress:  Not documented in this visit.              Point: Body mechanics (In Progress)       Learning Progress Summary             Patient Acceptance, E,TB, NR by AY at 9/15/2023 1202                         Point: Precautions (In Progress)       Learning Progress Summary             Patient Acceptance, E,TB, NR by AY at 9/15/2023 1202                                         User Key       Initials Effective Dates Name Provider Type Discipline     11/10/20 -  Yaquelin Person, BOOM Physical Therapist PT                  PT Recommendation and Plan  Planned Therapy Interventions (PT): balance training, bed mobility training, home exercise program, gait training, postural re-education, transfer training, patient/family education, neuromuscular re-education, strengthening  Plan of Care Reviewed With: patient  Progress: improving  Outcome Evaluation: Pt limited by decreased functional endurance, balance deficit, and generalized weakness compared to baseline. Pt ambulated 60ft with RWx and min A. Rec continued skilled PT to increase indep with mobility. d/c rec for SNF.     Time Calculation:   PT Evaluation Complexity  History, PT Evaluation Complexity: 1-2 personal factors and/or comorbidities  Examination of Body Systems (PT Eval Complexity): total of 3 or more elements  Clinical Presentation (PT Evaluation Complexity): evolving  Clinical Decision Making (PT Evaluation Complexity): moderate complexity  Overall Complexity (PT Evaluation Complexity): moderate complexity     PT Charges       Row Name 09/15/23 1203             Time Calculation     Start Time 0940  -AY      PT Received On 09/15/23  -AY      PT Goal Re-Cert Due Date 09/25/23  -AY         Untimed Charges    PT Eval/Re-eval Minutes 48  -AY         Total Minutes    Untimed Charges Total Minutes 48  -AY       Total Minutes 48  -AY                User Key  (r) = Recorded By, (t) = Taken By, (c) = Cosigned By      Initials Name Provider Type    AY Yaquelin Person, BOOM Physical Therapist                  Therapy Charges for Today       Code Description Service Date Service Provider Modifiers Qty    [id number] HC PT EVAL MOD COMPLEXITY 4 9/15/2023 Yaquelin Person, BOOM GP 1            PT G-Codes  Outcome Measure Options: AM-PAC 6 Clicks Basic Mobility (PT)  AM-PAC 6 Clicks Score (PT): 17  AM-PAC 6 Clicks Score (OT): 18  PT Discharge Summary  Anticipated Discharge Disposition (PT): skilled nursing facility    Yaquelin Person PT  9/15/2023

## 2023-09-15 NOTE — PROGRESS NOTES
"[facility]  IP Progress Note      Chief Complaint: Follow-up of non-STEMI/CAD    Subjective   The patient was feeling well yesterday at the time of my evaluation however started having chest pain intermittently all night which responds to nitrates and morphine.  Feels that the chest pain she gets very short of breath.  At times she feels that drinking cold water triggers her pain.    Objective     Blood pressure 136/72, pulse 78, temperature 98.5 °F (36.9 °C), temperature source Oral, resp. rate 18, height 154.9 cm (61\"), weight 79.9 kg (176 lb 2.4 oz), SpO2 92 %.     Intake/Output Summary (Last 24 hours) at 9/15/2023 0726  Last data filed at 9/15/2023 0600  Gross per 24 hour   Intake 486.64 ml   Output 500 ml   Net -13.36 ml       Physical Exam:  General: No acute distress.   Neck: no JVD.  Chest:No respiratory distress, breath sounds are slightly diminished at bases with fine crackles.    Cardiovascular: Normal S1 and S2, distant heart sounds  Extremities: No edema.    Results Review:     I reviewed the patient's new clinical results.    Results from last 7 days   Lab Units 09/15/23  0416   WBC 10*3/mm3 14.24*   HEMOGLOBIN g/dL 9.5*   HEMATOCRIT % 31.1*   PLATELETS 10*3/mm3 204     Results from last 7 days   Lab Units 09/15/23  0416 09/13/23  2219   SODIUM mmol/L 140 141   POTASSIUM mmol/L 5.0 4.6   CHLORIDE mmol/L 106 107   CO2 mmol/L 23.0 24.0   BUN mg/dL 26* 20   CREATININE mg/dL 1.55* 1.65*   CALCIUM mg/dL 9.1 8.6   BILIRUBIN mg/dL  --  0.3   ALK PHOS U/L  --  63   ALT (SGPT) U/L  --  10   AST (SGOT) U/L  --  18   GLUCOSE mg/dL 113* 118*     Results from last 7 days   Lab Units 09/15/23  0416   SODIUM mmol/L 140   POTASSIUM mmol/L 5.0   CHLORIDE mmol/L 106   CO2 mmol/L 23.0   BUN mg/dL 26*   CREATININE mg/dL 1.55*   GLUCOSE mg/dL 113*   CALCIUM mg/dL 9.1     Results from last 7 days   Lab Units 09/13/23 2219 09/13/23  1804 09/08/23  1923   INR  1.22* 1.15 1.11     Lab " Results   Component Value Date    CKTOTAL 236 (H) 09/09/2023    TROPONINT 872 (C) 09/14/2023     Results from last 7 days   Lab Units 09/13/23  2219   TSH uIU/mL 0.515     Results from last 7 days   Lab Units 09/14/23  0049   CHOLESTEROL mg/dL 122   TRIGLYCERIDES mg/dL 64   HDL CHOL mg/dL 67*   LDL CHOL mg/dL 41           Tele: Sinus Rythym  EKG: Sinus rhythm with inferior lateral ischemia.    Assessment:  Non-STEMI.  CAD, status post remote CABG.  Normal ejection fraction.  Cardiac catheterization study at [facility] showed severe triple-vessel native disease.  Occlusion of the vein graft to the RCA and the circumflex.  Patent LIMA graft to the LAD that serves the LAD and retrogradely serves a large diagonal as well as serves collaterals to the occluded RCA.  Hypertension.  Dyslipidemia.  CKD, serum creatinine at baseline.  Anemia.  Plan:  The patient does not have suitable targets to consider redo bypass grafting.  I have reviewed her angiograms again and based on ongoing chest pain with ischemic changes it appears that she may be significantly ischemic in the circumflex territory specially with normal LV function.  The entire circumflex distribution is dependent on multiple occluded/collateralized branches which have poor flow due to severe left main stenosis.  Also the circumflex graft appears to be a recent occlusion.  The RCA graft appears chronically occluded and the occluded RCA is collateralized.  No significant collaterals serving the circumflex are noted.  It is felt that proceeding to stenting of the ostial left main can improve flow to diseased branches in the circumflex territory.  Also we can attempt PCI of the recently occluded vein graft to the obtuse marginal.  I have reviewed the angiograms with Dr. Gilliam who will be performing the high risk two-vessel PCI today as I am in the office.  Continue GDMT for now including dual antiplatelet therapy, current dose of metoprolol, amlodipine,  Nitropaste and also continue intravenous heparin for now.  The patient appears mildly volume overloaded however we will avoid hydration at this time and in this patient of cath, post procedure she would benefit from Lasix.  Discontinue lisinopril due to renal dysfunction and need for contrast based studies.    Jacinta Jamison MD, FACC, [facility]

## 2023-09-15 NOTE — PROGRESS NOTES
HEPARIN INFUSION  Betty Rod is a  82 y.o. female receiving heparin infusion.        Therapy for (VTE/Cardiac):   Cardiac  Patient Weight: 78.7 kg  Initial Bolus (Y/N):   No  Any Bolus (Y/N):   Yes        Signs or Symptoms of Bleeding: No  Cardiac or Other (Not VTE)   Initial Bolus: 60 units/kg (Max 4,000 units)  Initial rate: 12 units/kg/hr (Max 1,000 units/hr)   Anti Xa Rebolus Infusion Hold time Change infusion Dose (Units/kg/hr) Next Anti Xa Level Due   < 0.11 50 units/kg   (4000 Units Max) None  Increase by  3 Units/kg/hr 6 hours   0.11 - 0.19 25 units/kg             (2000 Units Max) None  Increase by  2 Units/kg/hr 6 hours   0.2 - 0.29 0 None  Increase by  1 Units/kg/hr 6 hours   0.3 - 0.5 0 None  No Change 6 hours   (after 2 consecutive  levels in range check qAM)   0.51 - 0.6 0 None  Decrease by  1 Units/kg/hr 6 hours   0.61 - 0.8 0 30 Minutes Decrease by  2 Units/kg/hr 6 hours   0.81 - 1 0 60 Minutes  Decrease by  3 Units/kg/hr 6 hours   >1 0 Hold  After Anti Xa <0.5  decrease old rate by   4 Units/kg/hr  Every 2 hours until Anti Xa <0.5  then  when infusion restarts in 6 hours     Results from last 7 days   Lab Units 09/15/23  0416 09/13/23  2219 09/13/23  1804 09/08/23  1923   INR   --  1.22* 1.15 1.11   HEMOGLOBIN g/dL 9.5* 9.2*  --   --    HEMATOCRIT % 31.1* 29.4*  --   --    PLATELETS 10*3/mm3 204 193  --   --        Date   Time   Anti-Xa Current Rate (Unit/kg/hr) Bolus   (Units) Rate Change   (Unit/kg/hr) New Rate (Unit/kg/hr) Next   Anti-Xa Comments  Pump Check Daily   9/13 2230 -- 0 0 +12 12 0200 Heparin drip will have been off for 15-20 minutes on transfer.  I will push the anti-Xa level a few hours after restart  Keisha [id number] -acb   9/14 0344 0.44 12 -- -- 12 1000 Keisha 6614 -acb   9/14 1009 0.19 12 1000 +2 14 1800 Spoke w/ Sandra   9/14 1832 0.56 14 -- -1 13 0200 DW YULIA Flores    9/15 0400 0.41 13 -- -- 13 1000 D/w RN   9/15 1034 0.44 13 -- -- 13  Going to Cath lab at 1300

## 2023-09-16 LAB
ALBUMIN SERPL-MCNC: 2.9 G/DL (ref 3.5–5.2)
ALBUMIN/GLOB SERPL: 0.9 G/DL
ALP SERPL-CCNC: 64 U/L (ref 39–117)
ALT SERPL W P-5'-P-CCNC: 20 U/L (ref 1–33)
ANION GAP SERPL CALCULATED.3IONS-SCNC: 12 MMOL/L (ref 5–15)
AST SERPL-CCNC: 68 U/L (ref 1–32)
BILIRUB SERPL-MCNC: 0.4 MG/DL (ref 0–1.2)
BUN SERPL-MCNC: 29 MG/DL (ref 8–23)
BUN/CREAT SERPL: 18.6 (ref 7–25)
CALCIUM SPEC-SCNC: 9 MG/DL (ref 8.6–10.5)
CHLORIDE SERPL-SCNC: 107 MMOL/L (ref 98–107)
CO2 SERPL-SCNC: 21 MMOL/L (ref 22–29)
CREAT SERPL-MCNC: 1.56 MG/DL (ref 0.57–1)
DEPRECATED RDW RBC AUTO: 57.1 FL (ref 37–54)
EGFRCR SERPLBLD CKD-EPI 2021: 33.3 ML/MIN/1.73
ERYTHROCYTE [DISTWIDTH] IN BLOOD BY AUTOMATED COUNT: 15.6 % (ref 12.3–15.4)
GLOBULIN UR ELPH-MCNC: 3.4 GM/DL
GLUCOSE BLDC GLUCOMTR-MCNC: 120 MG/DL (ref 70–130)
GLUCOSE BLDC GLUCOMTR-MCNC: 162 MG/DL (ref 70–130)
GLUCOSE BLDC GLUCOMTR-MCNC: 229 MG/DL (ref 70–130)
GLUCOSE BLDC GLUCOMTR-MCNC: 233 MG/DL (ref 70–130)
GLUCOSE SERPL-MCNC: 108 MG/DL (ref 65–99)
HCT VFR BLD AUTO: 29.4 % (ref 34–46.6)
HGB BLD-MCNC: 9.1 G/DL (ref 12–15.9)
MAGNESIUM SERPL-MCNC: 2.3 MG/DL (ref 1.6–2.4)
MCH RBC QN AUTO: 31.4 PG (ref 26.6–33)
MCHC RBC AUTO-ENTMCNC: 31 G/DL (ref 31.5–35.7)
MCV RBC AUTO: 101.4 FL (ref 79–97)
PA ADP PRP-ACNC: 259 PRU
PHOSPHATE SERPL-MCNC: 4.3 MG/DL (ref 2.5–4.5)
PLATELET # BLD AUTO: 188 10*3/MM3 (ref 140–450)
PMV BLD AUTO: 10.1 FL (ref 6–12)
POTASSIUM SERPL-SCNC: 4.9 MMOL/L (ref 3.5–5.2)
PROT SERPL-MCNC: 6.3 G/DL (ref 6–8.5)
RBC # BLD AUTO: 2.9 10*6/MM3 (ref 3.77–5.28)
SODIUM SERPL-SCNC: 140 MMOL/L (ref 136–145)
WBC NRBC COR # BLD: 13.1 10*3/MM3 (ref 3.4–10.8)

## 2023-09-16 PROCEDURE — 25010000002 MORPHINE PER 10 MG: Performed by: INTERNAL MEDICINE

## 2023-09-16 PROCEDURE — 80053 COMPREHEN METABOLIC PANEL: CPT | Performed by: INTERNAL MEDICINE

## 2023-09-16 PROCEDURE — 85576 BLOOD PLATELET AGGREGATION: CPT | Performed by: INTERNAL MEDICINE

## 2023-09-16 PROCEDURE — 83735 ASSAY OF MAGNESIUM: CPT | Performed by: INTERNAL MEDICINE

## 2023-09-16 PROCEDURE — 99232 SBSQ HOSP IP/OBS MODERATE 35: CPT | Performed by: INTERNAL MEDICINE

## 2023-09-16 PROCEDURE — 85027 COMPLETE CBC AUTOMATED: CPT | Performed by: INTERNAL MEDICINE

## 2023-09-16 PROCEDURE — 82948 REAGENT STRIP/BLOOD GLUCOSE: CPT

## 2023-09-16 PROCEDURE — 63710000001 INSULIN LISPRO (HUMAN) PER 5 UNITS: Performed by: INTERNAL MEDICINE

## 2023-09-16 PROCEDURE — 84100 ASSAY OF PHOSPHORUS: CPT | Performed by: INTERNAL MEDICINE

## 2023-09-16 RX ORDER — ISOSORBIDE MONONITRATE 60 MG/1
60 TABLET, EXTENDED RELEASE ORAL
Status: DISCONTINUED | OUTPATIENT
Start: 2023-09-16 | End: 2023-09-17

## 2023-09-16 RX ORDER — BUMETANIDE 1 MG/1
0.5 TABLET ORAL DAILY
Status: DISCONTINUED | OUTPATIENT
Start: 2023-09-16 | End: 2023-09-17

## 2023-09-16 RX ADMIN — NITROGLYCERIN 1 INCH: 20 OINTMENT TOPICAL at 06:00

## 2023-09-16 RX ADMIN — NITROGLYCERIN 1 INCH: 20 OINTMENT TOPICAL at 11:28

## 2023-09-16 RX ADMIN — ASPIRIN 81 MG: 81 TABLET, CHEWABLE ORAL at 08:16

## 2023-09-16 RX ADMIN — AMLODIPINE BESYLATE 5 MG: 5 TABLET ORAL at 08:16

## 2023-09-16 RX ADMIN — INSULIN LISPRO 2 UNITS: 100 INJECTION, SOLUTION INTRAVENOUS; SUBCUTANEOUS at 17:27

## 2023-09-16 RX ADMIN — METOPROLOL TARTRATE 25 MG: 25 TABLET, FILM COATED ORAL at 20:06

## 2023-09-16 RX ADMIN — PANTOPRAZOLE SODIUM 40 MG: 40 INJECTION, POWDER, LYOPHILIZED, FOR SOLUTION INTRAVENOUS at 05:58

## 2023-09-16 RX ADMIN — ISOSORBIDE MONONITRATE 60 MG: 60 TABLET, EXTENDED RELEASE ORAL at 08:26

## 2023-09-16 RX ADMIN — NITROGLYCERIN 1 INCH: 20 OINTMENT TOPICAL at 18:11

## 2023-09-16 RX ADMIN — MORPHINE SULFATE 2 MG: 2 INJECTION, SOLUTION INTRAMUSCULAR; INTRAVENOUS at 20:05

## 2023-09-16 RX ADMIN — Medication 10 ML: at 08:32

## 2023-09-16 RX ADMIN — INSULIN LISPRO 3 UNITS: 100 INJECTION, SOLUTION INTRAVENOUS; SUBCUTANEOUS at 12:19

## 2023-09-16 RX ADMIN — Medication 10 ML: at 20:06

## 2023-09-16 RX ADMIN — SENNOSIDES AND DOCUSATE SODIUM 2 TABLET: 8.6; 5 TABLET ORAL at 20:06

## 2023-09-16 RX ADMIN — MORPHINE SULFATE 2 MG: 2 INJECTION, SOLUTION INTRAMUSCULAR; INTRAVENOUS at 07:36

## 2023-09-16 RX ADMIN — CLOPIDOGREL BISULFATE 75 MG: 75 TABLET ORAL at 08:16

## 2023-09-16 RX ADMIN — METOPROLOL TARTRATE 25 MG: 25 TABLET, FILM COATED ORAL at 08:16

## 2023-09-16 RX ADMIN — INSULIN LISPRO 3 UNITS: 100 INJECTION, SOLUTION INTRAVENOUS; SUBCUTANEOUS at 20:24

## 2023-09-16 RX ADMIN — ALLOPURINOL 100 MG: 100 TABLET ORAL at 08:16

## 2023-09-16 RX ADMIN — ATORVASTATIN CALCIUM 40 MG: 40 TABLET, FILM COATED ORAL at 20:06

## 2023-09-16 RX ADMIN — BUMETANIDE 0.5 MG: 1 TABLET ORAL at 08:26

## 2023-09-16 RX ADMIN — MORPHINE SULFATE 2 MG: 2 INJECTION, SOLUTION INTRAMUSCULAR; INTRAVENOUS at 04:28

## 2023-09-16 NOTE — PLAN OF CARE
Goal Outcome Evaluation:  Plan of Care Reviewed With: patient, daughter        Progress: improving  Outcome Evaluation: TR band removed with no complications, right wrist site C.D.I. VSS, pt remains afebrile. Total uop for the shift was 375ml. Daughter at bedside. Labs pending.

## 2023-09-16 NOTE — PROGRESS NOTES
INTENSIVIST   PROGRESS NOTE     Hospital:  LOS: 3 days     Ms. Betty Rod, 82 y.o. female is followed for a Chief Complaint of: Chest pain      Subjective   S     Interval History:  Continues to have chest pain. No EKG changes.        The patient's relevant past medical, surgical and social history were reviewed and updated in Epic as appropriate.      ROS:   Constitutional: Negative for fever.   Respiratory: Negative for dyspnea.   Cardiovascular: Positive for chest pain.   Gastrointestinal: Negative for  nausea, vomiting and diarrhea.     Objective   O     Vitals:  Temp  Min: 97.5 °F (36.4 °C)  Max: 98.4 °F (36.9 °C)  BP  Min: 117/72  Max: 169/96  Pulse  Min: 57  Max: 104  Resp  Min: 16  Max: 24  SpO2  Min: 88 %  Max: 100 % Flow (L/min)  Min: 2  Max: 10    Intake/Ouptut 24 hrs (7:00AM - 6:59 AM)  Intake & Output (last 3 days)         09/13 0701  09/14 0700 09/14 0701  09/15 0700 09/15 0701  09/16 0700 09/16 0701  09/17 0700    P.O.  150 120     I.V. (mL/kg) 50.4 (0.6) 336.6 (4.2) 477.1 (6.2)     Total Intake(mL/kg) 50.4 (0.6) 486.6 (6.1) 597.1 (7.7)     Urine (mL/kg/hr)  500 (0.3) 975 (0.5)     Stool  0      Total Output  500 975     Net +50.4 -13.4 -377.9             Urine Unmeasured Occurrence   2 x     Stool Unmeasured Occurrence  1 x              Medications (drips):  nitroglycerin, Last Rate: Stopped (09/14/23 1017)          Physical Examination  Telemetry:  Normal sinus rhythm.    Constitutional:  No acute distress.  Sitting up in bed.    Eyes: No scleral icterus.   PERRL, EOM intact.    Neck:  Supple, FROM   Cardiovascular: Normal rate, regular and rhythm. Normal heart sounds.  No murmurs, gallop or rub.   Respiratory: No respiratory distress. Normal respiratory effort.  Normal breath sounds  Clear to auscultation   Abdominal:  Soft. No masses. Nontender. No distension. No HSM.   Extremities: No digital cyanosis. No clubbing.  No peripheral edema.   Skin: No rashes, lesions or ulcers   Neurological:    Alert and interactive.              Results from last 7 days   Lab Units 09/16/23  0352 09/15/23  0416 09/13/23 2219   WBC 10*3/mm3 13.10* 14.24* 16.28*   HEMOGLOBIN g/dL 9.1* 9.5* 9.2*   MCV fL 101.4* 103.7* 101.0*   PLATELETS 10*3/mm3 188 204 193     Results from last 7 days   Lab Units 09/16/23  0352 09/15/23  0416 09/13/23  2219   SODIUM mmol/L 140 140 141   POTASSIUM mmol/L 4.9 5.0 4.6   CO2 mmol/L 21.0* 23.0 24.0   CREATININE mg/dL 1.56* 1.55* 1.65*   GLUCOSE mg/dL 108* 113* 118*   MAGNESIUM mg/dL 2.3 2.3 1.9   PHOSPHORUS mg/dL 4.3 2.8 2.5     Estimated Creatinine Clearance: 26.6 mL/min (A) (by C-G formula based on SCr of 1.56 mg/dL (H)).  Results from last 7 days   Lab Units 09/16/23  0352 09/13/23 2219   ALK PHOS U/L 64 63   BILIRUBIN mg/dL 0.4 0.3   ALT (SGPT) U/L 20 10   AST (SGOT) U/L 68* 18             Images:  Imaging Results (Last 24 Hours)       ** No results found for the last 24 hours. **               Results: Reviewed.  I reviewed the patient's new laboratory and imaging results.  I independently reviewed the patient's new images.    Medications: Reviewed.    Assessment & Plan   A / P     Betty Rod is a 82 y.o. female who has a PMHX of CAD (s/p CABG) who was transferred to our facility for work up for potential high-risk PCI.       Patient was admitted to [facility] on 9/8 from [facility] w/ NSTEMI with a [facility] on 9/12 which showed multiple re-occlusions.  No intervention was performed.  She developed increased CP and troponin elevation on 9/13 and so was transferred to our facility for higher level of care.    She had a LHC on9/15/23 with 80% ostial left main stenosis s/p OCT guided PCI with a TOMAS and attempted PTCA of the mid-circumflex but a balloon would not cross.     She continues to have chest pain.     Nutrition:   Diet: Cardiac Diets; Healthy Heart (2-3 Na+); Texture: Regular Texture (IDDSI 7); Fluid Consistency: Thin (IDDSI 0)  Advance Directives:   Code Status and Medical  Interventions:   Ordered at: 09/13/23 2203     Code Status (Patient has no pulse and is not breathing):    CPR (Attempt to Resuscitate)     Medical Interventions (Patient has pulse or is breathing):    Full Support       Active Hospital Problems    Diagnosis     **NSTEMI (non-ST elevated myocardial infarction)     Type 1 myocardial infarction     Atelectasis     Pleural effusion     Acute respiratory failure     CKD (chronic kidney disease)     Coronary artery disease     Diabetes mellitus     Emphysema of lung     Gout     Hypertension        Assessment / Plan:    Cardiology to add Imdur and restart home Bumex.   Continue to monitor renal function. Stable at 1.56 this AM.   Continue SSI.   Titrate supplemental oxygen. Can hopefully wean with the addition of diuretics.   AM labs    High level of risk due to:  severe exacerbation of chronic illness and illness with threat to life or bodily function.    Plan of care and goals reviewed during interdisciplinary rounds.  I discussed the patient's findings and my recommendations with patient and nursing staff    Eliane Garcia,     Intensive Care Medicine and Pulmonary Medicine

## 2023-09-16 NOTE — PLAN OF CARE
Goal Outcome Evaluation:   Patient remains alert, oriented and pleasant. Minimal complaints of chest pain today. Morphine required x1. Nitro paste applied q2h. Imdur and bumex added by cardiology. Patient up in chair for all meals with encouragement. 1-3L NC. BM x2, voided 950. Patient and family updated on plan of care.

## 2023-09-16 NOTE — PROGRESS NOTES
"Betty Rod  [id number]  1940   LOS: 3 days   Patient Care Team:  Boy Esparza PA as PCP - General (Physician Assistant)    Chief Complaint:  IHD / NSTEMI / ANEMIA / T2 DM / HTN / CKD    Subjective     Multiple complaints and difficulties this morning.  She complains of intermittent nausea, epigastric pain, \"chest congestion\", and continued upper chest \"hurting pain\" and fatigue.  She slept poorly according to her daughter.  No definite emesis or refractory abdominal pain.  No difficulty with catheterization site.  Fair appetite.    Objective     Vital Sign Min/Max for last 24 hours  Temp  Min: 97.5 °F (36.4 °C)  Max: 98.5 °F (36.9 °C)   BP  Min: 117/72  Max: 170/80   Pulse  Min: 57  Max: 104   Resp  Min: 16  Max: 26   SpO2  Min: 88 %  Max: 100 %      Weight  Min: 77.1 kg (169 lb 15.6 oz)  Max: 77.1 kg (169 lb 15.6 oz)         09/15/23  0600 09/16/23  0400   Weight: 79.9 kg (176 lb 2.4 oz) 77.1 kg (169 lb 15.6 oz)         Intake/Output Summary (Last 24 hours) at 9/16/2023 0751  Last data filed at 9/16/2023 0400  Gross per 24 hour   Intake 597.08 ml   Output 975 ml   Net -377.92 ml       Physical Exam:     General Appearance:    Alert, cooperative, in no acute distress   Lungs:   Diminished breath sounds with bibasilar crackles,respirations regular, even and  unlabored on 2 L/min nasal cannula (oximetry 93%).    Heart:    Regular and normal rate, normal S1 and S2, grade 1/6 systolic murmur, no gallop, no rub, no click   Abdomen:  Extremities:   Soft, nontender, bowel sounds audible x4    No edema, normal range of motion   Pulses:   Pulses palpable and equal bilaterally      Results Review:   Results from last 7 days   Lab Units 09/16/23  0352 09/15/23  0416 09/13/23  7885   SODIUM mmol/L 140 140 141   POTASSIUM mmol/L 4.9 5.0 4.6   CHLORIDE mmol/L 107 106 107   CO2 mmol/L 21.0* 23.0 24.0   BUN mg/dL 29* 26* 20   CREATININE mg/dL 1.56* 1.55* 1.65*   GLUCOSE mg/dL 108* 113* 118*   CALCIUM mg/dL 9.0 9.1 " 8.6     Results from last 7 days   Lab Units 09/16/23  0352 09/15/23  0416 09/13/23 2219   WBC 10*3/mm3 13.10* 14.24* 16.28*   HEMOGLOBIN g/dL 9.1* 9.5* 9.2*   HEMATOCRIT % 29.4* 31.1* 29.4*   PLATELETS 10*3/mm3 188 204 193     Results from last 7 days   Lab Units 09/14/23  0049   CHOLESTEROL mg/dL 122   TRIGLYCERIDES mg/dL 64   HDL CHOL mg/dL 67*   LDL CHOL mg/dL 41     Results from last 7 days   Lab Units 09/14/23  0049   HEMOGLOBIN A1C % 6.20*     Results from last 7 days   Lab Units 09/14/23  0049 09/13/23 2219 09/09/23 2117   CK TOTAL U/L  --   --  236*   HSTROP T ng/L 872* 796*  --      ALBUMIN: 2.9  AST: 68  P2Y 12: 259  NO NEW CXR / EKG.  LH (9/15/2023):    80% ostial left main stenosis status post OCT guided PCI with a 4.0 x 12 mm Xience TOMAS.   Attempted PTCA of the mid circumflex but a 1.5 balloon would not cross.    Medication Review: REVIEWED; NO DRIPS.    Assessment & Plan     Slow progress.  We will add empiric nitrates in the form of Imdur 60 mg daily and restart her home dose of Bumex 0.5 mg p.o. daily.  Would mobilize in room today and in particular up in chair for meals.  We will reassess GFR/magnesium level in AM.    Discussed with patient and daughter in room as well as RN.      NSTEMI (non-ST elevated myocardial infarction)    CKD (chronic kidney disease)    Coronary artery disease    Diabetes mellitus    Emphysema of lung    Gout    Hypertension    Atelectasis    Pleural effusion    Acute respiratory failure    Type 1 myocardial infarction    09/16/23  07:51 EDT

## 2023-09-17 LAB
ALBUMIN SERPL-MCNC: 3.2 G/DL (ref 3.5–5.2)
ANION GAP SERPL CALCULATED.3IONS-SCNC: 9 MMOL/L (ref 5–15)
BUN SERPL-MCNC: 32 MG/DL (ref 8–23)
BUN/CREAT SERPL: 18.5 (ref 7–25)
CALCIUM SPEC-SCNC: 9.4 MG/DL (ref 8.6–10.5)
CHLORIDE SERPL-SCNC: 106 MMOL/L (ref 98–107)
CO2 SERPL-SCNC: 26 MMOL/L (ref 22–29)
CREAT SERPL-MCNC: 1.73 MG/DL (ref 0.57–1)
EGFRCR SERPLBLD CKD-EPI 2021: 29.4 ML/MIN/1.73
GLUCOSE BLDC GLUCOMTR-MCNC: 157 MG/DL (ref 70–130)
GLUCOSE BLDC GLUCOMTR-MCNC: 162 MG/DL (ref 70–130)
GLUCOSE BLDC GLUCOMTR-MCNC: 198 MG/DL (ref 70–130)
GLUCOSE BLDC GLUCOMTR-MCNC: 256 MG/DL (ref 70–130)
GLUCOSE SERPL-MCNC: 169 MG/DL (ref 65–99)
MAGNESIUM SERPL-MCNC: 2.1 MG/DL (ref 1.6–2.4)
PHOSPHATE SERPL-MCNC: 3.1 MG/DL (ref 2.5–4.5)
POTASSIUM SERPL-SCNC: 4.7 MMOL/L (ref 3.5–5.2)
QT INTERVAL: 312 MS
QT INTERVAL: 326 MS
QTC INTERVAL: 412 MS
QTC INTERVAL: 428 MS
SODIUM SERPL-SCNC: 141 MMOL/L (ref 136–145)

## 2023-09-17 PROCEDURE — 99232 SBSQ HOSP IP/OBS MODERATE 35: CPT | Performed by: INTERNAL MEDICINE

## 2023-09-17 PROCEDURE — 63710000001 INSULIN LISPRO (HUMAN) PER 5 UNITS: Performed by: INTERNAL MEDICINE

## 2023-09-17 PROCEDURE — 25010000002 ONDANSETRON PER 1 MG: Performed by: INTERNAL MEDICINE

## 2023-09-17 PROCEDURE — 93005 ELECTROCARDIOGRAM TRACING: CPT | Performed by: INTERNAL MEDICINE

## 2023-09-17 PROCEDURE — 80069 RENAL FUNCTION PANEL: CPT | Performed by: INTERNAL MEDICINE

## 2023-09-17 PROCEDURE — 83735 ASSAY OF MAGNESIUM: CPT | Performed by: INTERNAL MEDICINE

## 2023-09-17 PROCEDURE — 25010000002 MORPHINE PER 10 MG: Performed by: INTERNAL MEDICINE

## 2023-09-17 PROCEDURE — 93010 ELECTROCARDIOGRAM REPORT: CPT | Performed by: INTERNAL MEDICINE

## 2023-09-17 PROCEDURE — 82948 REAGENT STRIP/BLOOD GLUCOSE: CPT

## 2023-09-17 RX ORDER — ISOSORBIDE MONONITRATE 60 MG/1
120 TABLET, EXTENDED RELEASE ORAL
Status: DISCONTINUED | OUTPATIENT
Start: 2023-09-17 | End: 2023-09-20 | Stop reason: HOSPADM

## 2023-09-17 RX ORDER — METOPROLOL TARTRATE 50 MG/1
50 TABLET, FILM COATED ORAL EVERY 12 HOURS SCHEDULED
Status: DISCONTINUED | OUTPATIENT
Start: 2023-09-17 | End: 2023-09-19

## 2023-09-17 RX ORDER — ALPRAZOLAM 0.25 MG/1
0.25 TABLET ORAL 3 TIMES DAILY PRN
Status: DISCONTINUED | OUTPATIENT
Start: 2023-09-17 | End: 2023-09-18

## 2023-09-17 RX ADMIN — ASPIRIN 81 MG: 81 TABLET, CHEWABLE ORAL at 09:44

## 2023-09-17 RX ADMIN — CLOPIDOGREL BISULFATE 75 MG: 75 TABLET ORAL at 09:44

## 2023-09-17 RX ADMIN — MORPHINE SULFATE 2 MG: 2 INJECTION, SOLUTION INTRAMUSCULAR; INTRAVENOUS at 22:30

## 2023-09-17 RX ADMIN — Medication 10 ML: at 20:17

## 2023-09-17 RX ADMIN — MORPHINE SULFATE 2 MG: 2 INJECTION, SOLUTION INTRAMUSCULAR; INTRAVENOUS at 03:11

## 2023-09-17 RX ADMIN — ISOSORBIDE MONONITRATE 120 MG: 60 TABLET, EXTENDED RELEASE ORAL at 09:44

## 2023-09-17 RX ADMIN — INSULIN LISPRO 2 UNITS: 100 INJECTION, SOLUTION INTRAVENOUS; SUBCUTANEOUS at 12:21

## 2023-09-17 RX ADMIN — ALPRAZOLAM 0.25 MG: 0.25 TABLET ORAL at 04:33

## 2023-09-17 RX ADMIN — MORPHINE SULFATE 2 MG: 2 INJECTION, SOLUTION INTRAMUSCULAR; INTRAVENOUS at 20:30

## 2023-09-17 RX ADMIN — METOPROLOL TARTRATE 50 MG: 50 TABLET ORAL at 09:44

## 2023-09-17 RX ADMIN — INSULIN LISPRO 4 UNITS: 100 INJECTION, SOLUTION INTRAVENOUS; SUBCUTANEOUS at 20:29

## 2023-09-17 RX ADMIN — METOPROLOL TARTRATE 50 MG: 50 TABLET ORAL at 20:15

## 2023-09-17 RX ADMIN — INSULIN LISPRO 2 UNITS: 100 INJECTION, SOLUTION INTRAVENOUS; SUBCUTANEOUS at 16:57

## 2023-09-17 RX ADMIN — ATORVASTATIN CALCIUM 40 MG: 40 TABLET, FILM COATED ORAL at 20:15

## 2023-09-17 RX ADMIN — ALPRAZOLAM 0.25 MG: 0.25 TABLET ORAL at 22:26

## 2023-09-17 RX ADMIN — SENNOSIDES AND DOCUSATE SODIUM 2 TABLET: 8.6; 5 TABLET ORAL at 20:15

## 2023-09-17 RX ADMIN — PANTOPRAZOLE SODIUM 40 MG: 40 INJECTION, POWDER, LYOPHILIZED, FOR SOLUTION INTRAVENOUS at 05:01

## 2023-09-17 RX ADMIN — AMLODIPINE BESYLATE 5 MG: 5 TABLET ORAL at 09:44

## 2023-09-17 RX ADMIN — Medication 10 ML: at 09:45

## 2023-09-17 RX ADMIN — ALPRAZOLAM 0.25 MG: 0.25 TABLET ORAL at 13:43

## 2023-09-17 RX ADMIN — NITROGLYCERIN 0.4 MG: 0.4 TABLET SUBLINGUAL at 18:17

## 2023-09-17 RX ADMIN — NITROGLYCERIN 1 INCH: 20 OINTMENT TOPICAL at 04:36

## 2023-09-17 RX ADMIN — ALLOPURINOL 100 MG: 100 TABLET ORAL at 09:44

## 2023-09-17 RX ADMIN — INSULIN LISPRO 2 UNITS: 100 INJECTION, SOLUTION INTRAVENOUS; SUBCUTANEOUS at 08:03

## 2023-09-17 RX ADMIN — ONDANSETRON 4 MG: 2 INJECTION INTRAMUSCULAR; INTRAVENOUS at 03:15

## 2023-09-17 NOTE — PROGRESS NOTES
Betty Rod  [id number]  1940   LOS: 4 days   Patient Care Team:  Boy Esparza PA as PCP - General (Physician Assistant)    Chief Complaint:  IHD / NSTEMI / ANEMIA / T2 DM / HTN / CKD     Subjective     Up in chair and comfortable this morning on 2 L/min nasal cannula (oximetry 96%).  Patient had severe (grade 9/10) vague anterior precordial chest discomfort last night poorly relieved by nitroglycerin paste as well as IV morphine but somewhat improved with kind nurse attention and Xanax.  No complaints of cough, sputum production, pleurisy, or hemoptysis.  No significant abdominal pain.  Acceptable appetite.  No fever or chills.    Objective     Vital Sign Min/Max for last 24 hours  Temp  Min: 97.8 °F (36.6 °C)  Max: 98.3 °F (36.8 °C)   BP  Min: 109/86  Max: 155/89   Pulse  Min: 68  Max: 102   Resp  Min: 16  Max: 24   SpO2  Min: 90 %  Max: 97 %      Weight  Min: 76.4 kg (168 lb 6.9 oz)  Max: 76.4 kg (168 lb 6.9 oz)         09/16/23  0400 09/17/23  0447   Weight: 77.1 kg (169 lb 15.6 oz) 76.4 kg (168 lb 6.9 oz)         Intake/Output Summary (Last 24 hours) at 9/17/2023 0751  Last data filed at 9/17/2023 0500  Gross per 24 hour   Intake 490 ml   Output 1250 ml   Net -760 ml       Physical Exam:     General Appearance:    Alert, cooperative, in no acute distress   Lungs:   Left basilar crackles,respirations regular, even and                unlabored    Heart:    Regular and normal rate, normal S1 and S2, grade 1/6 systolic murmur, no gallop, no rub, no click   Abdomen:  Extremities:   Soft, nontender, bowel sounds audible x4    No edema, normal range of motion   Pulses:   Pulses palpable and equal bilaterally      Results Review:   Results from last 7 days   Lab Units 09/17/23  0524 09/16/23  0352 09/15/23  0416   SODIUM mmol/L 141 140 140   POTASSIUM mmol/L 4.7 4.9 5.0   CHLORIDE mmol/L 106 107 106   CO2 mmol/L 26.0 21.0* 23.0   BUN mg/dL 32* 29* 26*   CREATININE mg/dL 1.73* 1.56* 1.55*   GLUCOSE mg/dL  169* 108* 113*   CALCIUM mg/dL 9.4 9.0 9.1     Results from last 7 days   Lab Units 09/16/23  0352 09/15/23  0416 09/13/23 2219   WBC 10*3/mm3 13.10* 14.24* 16.28*   HEMOGLOBIN g/dL 9.1* 9.5* 9.2*   HEMATOCRIT % 29.4* 31.1* 29.4*   PLATELETS 10*3/mm3 188 204 193     Results from last 7 days   Lab Units 09/14/23  0049   CHOLESTEROL mg/dL 122   TRIGLYCERIDES mg/dL 64   HDL CHOL mg/dL 67*   LDL CHOL mg/dL 41     Results from last 7 days   Lab Units 09/14/23  0049   HEMOGLOBIN A1C % 6.20*     Results from last 7 days   Lab Units 09/14/23  0049 09/13/23 2219   HSTROP T ng/L 872* 796*     ACCU-CHEKS: 229 - 162 - 233 - 169 - 162 MG/DL.  MAGNESIUM: 2.1  ALBUMIN: 3.2  NO NEW CXR.  EKG:    Normal sinus rhythm with sinus arrhythmia  Septal infarct , age undetermined  Marked ST abnormality, possible inferolateral subendocardial injury  Abnormal ECG  When compared with ECG of 15-SEP-2023 18:42, (Unconfirmed)  premature supraventricular complexes are no longer present  Septal infarct is now present  ST no longer depressed in Anterior leads    Medication Review: REVIEWED; NO DRIPS.    Assessment & Plan     Increase in serum creatinine despite excellent diuresis on low-dose oral Bumex; will discontinue at this time.  In view of persistent intermittent unstable chest pain syndrome and abnormal electrocardiogram, will titrate upward on Imdur and beta-blocker dose and defer adding Ranexa at this time.  Prognosis guarded.      NSTEMI (non-ST elevated myocardial infarction)    CKD (chronic kidney disease)    Coronary artery disease    Diabetes mellitus    Emphysema of lung    Gout    Hypertension    Atelectasis    Pleural effusion    Acute respiratory failure    Type 1 myocardial infarction    09/17/23  07:51 EDT

## 2023-09-17 NOTE — PLAN OF CARE
Goal Outcome Evaluation:   Patient remained free of chest pain all shift until patient ambulated in the candelario after dinner. Nitro SL x1 given with relief. Xanax BID PRN changed to TID PRN as home dose is. Cardiology titrated up metoprolol and imdur dose. Given x1 with good effect. 450cc UO. Bmx1. Patient and family expressed interest in home health as patient does not want to go to rehab and still feels very weak when ambulating outside of room.

## 2023-09-17 NOTE — PROGRESS NOTES
INTENSIVIST   PROGRESS NOTE     Hospital:  LOS: 4 days     Ms. Betty Rod, 82 y.o. female is followed for a Chief Complaint of: Chest pain      Subjective   S     Interval History:  Chest pain overnight requiring nitro-paste and morphine.        The patient's relevant past medical, surgical and social history were reviewed and updated in Epic as appropriate.      ROS:   Constitutional: Negative for fever.   Respiratory: Negative for dyspnea.   Cardiovascular: Positive for chest pain.   Gastrointestinal: Negative for  nausea, vomiting and diarrhea.     Objective   O     Vitals:  Temp  Min: 97.8 °F (36.6 °C)  Max: 98.3 °F (36.8 °C)  BP  Min: 109/86  Max: 155/89  Pulse  Min: 70  Max: 102  Resp  Min: 16  Max: 24  SpO2  Min: 90 %  Max: 98 % Flow (L/min)  Min: 1  Max: 3    Intake/Ouptut 24 hrs (7:00AM - 6:59 AM)  Intake & Output (last 3 days)         09/14 0701  09/15 0700 09/15 0701  09/16 0700 09/16 0701  09/17 0700 09/17 0701  09/18 0700    P.O. 150 120 490 250    I.V. (mL/kg) 336.6 (4.2) 477.1 (6.2)      Total Intake(mL/kg) 486.6 (6.1) 597.1 (7.7) 490 (6.4) 250 (3.3)    Urine (mL/kg/hr) 500 (0.3) 975 (0.5) 1250 (0.7) 200 (0.6)    Stool 0  0     Total Output  200    Net -13.4 -377.9 -760 +50            Urine Unmeasured Occurrence  2 x 1 x     Stool Unmeasured Occurrence 1 x  2 x             Medications (drips):  nitroglycerin, Last Rate: Stopped (09/14/23 1017)          Physical Examination  Telemetry:  Normal sinus rhythm.    Constitutional:  No acute distress.  Standing beside the bed.    Eyes: No scleral icterus.   PERRL, EOM intact.    Neck:  Supple, FROM   Cardiovascular: Normal rate, regular and rhythm. Normal heart sounds.  No murmurs, gallop or rub.   Respiratory: No respiratory distress. Normal respiratory effort.  Normal breath sounds  Clear to auscultation   Abdominal:  Soft. No masses. Nontender. No distension. No HSM.   Extremities: No digital cyanosis. No clubbing.  No peripheral edema.    Skin: No rashes, lesions or ulcers   Neurological:   Alert and interactive.              Results from last 7 days   Lab Units 09/16/23  0352 09/15/23  0416 09/13/23  2219   WBC 10*3/mm3 13.10* 14.24* 16.28*   HEMOGLOBIN g/dL 9.1* 9.5* 9.2*   MCV fL 101.4* 103.7* 101.0*   PLATELETS 10*3/mm3 188 204 193     Results from last 7 days   Lab Units 09/17/23  0524 09/16/23  0352 09/15/23  0416   SODIUM mmol/L 141 140 140   POTASSIUM mmol/L 4.7 4.9 5.0   CO2 mmol/L 26.0 21.0* 23.0   CREATININE mg/dL 1.73* 1.56* 1.55*   GLUCOSE mg/dL 169* 108* 113*   MAGNESIUM mg/dL 2.1 2.3 2.3   PHOSPHORUS mg/dL 3.1 4.3 2.8     Estimated Creatinine Clearance: 23.8 mL/min (A) (by C-G formula based on SCr of 1.73 mg/dL (H)).  Results from last 7 days   Lab Units 09/16/23  0352 09/13/23  2219   ALK PHOS U/L 64 63   BILIRUBIN mg/dL 0.4 0.3   ALT (SGPT) U/L 20 10   AST (SGOT) U/L 68* 18             Images:  Imaging Results (Last 24 Hours)       ** No results found for the last 24 hours. **               Results: Reviewed.  I reviewed the patient's new laboratory and imaging results.  I independently reviewed the patient's new images.    Medications: Reviewed.    Assessment & Plan   A / P     Betty Rod is a 82 y.o. female who has a PMHX of CAD (s/p CABG) who was transferred to our facility for work up for potential high-risk PCI.       Patient was admitted to [facility] on 9/8 from [facility] w/ NSTEMI with a LHC on 9/12 which showed multiple re-occlusions.  No intervention was performed.  She developed increased CP and troponin elevation on 9/13 and so was transferred to our facility for higher level of care.    She had a LHC on9/15/23 with 80% ostial left main stenosis s/p OCT guided PCI with a TOMAS and attempted PTCA of the mid-circumflex but a balloon would not cross.     She continues to have chest pain.     Nutrition:   Diet: Cardiac Diets; Healthy Heart (2-3 Na+); Texture: Regular Texture (IDDSI 7); Fluid Consistency: Thin  (IDDSI 0)  Advance Directives:   Code Status and Medical Interventions:   Ordered at: 09/13/23 5559     Code Status (Patient has no pulse and is not breathing):    CPR (Attempt to Resuscitate)     Medical Interventions (Patient has pulse or is breathing):    Full Support       Active Hospital Problems    Diagnosis     **NSTEMI (non-ST elevated myocardial infarction)     Type 1 myocardial infarction     Atelectasis     Pleural effusion     Acute respiratory failure     CKD (chronic kidney disease)     Coronary artery disease     Diabetes mellitus     Emphysema of lung     Gout     Hypertension        Assessment / Plan:    Cardiology increase Imdur and Metoprolol.  Stop home bumex as creatinine increased.   Continue SSI.   Titrate supplemental oxygen.   AM labs    High level of risk due to:  severe exacerbation of chronic illness and illness with threat to life or bodily function.    Plan of care and goals reviewed during interdisciplinary rounds.  I discussed the patient's findings and my recommendations with patient and nursing staff    Eliane Garcia, DO    Intensive Care Medicine and Pulmonary Medicine

## 2023-09-17 NOTE — PLAN OF CARE
"Goal Outcome Evaluation:  Plan of Care Reviewed With: patient        Progress: improving  Outcome Evaluation: Pt remains afebrile, VSS. Patient c/o chest pain around 0300 this morning, morphine given, ekg obtained no changes noted. RN spoke w/ APRN and nitro paste applied early, dose was due at 0600. Pt did appear anxious and stated she was \"afraid to close her eyes for fear she may not open them again\". Xanax 0.25mg given and RN remained with pt for a period of time, till pt symptoms improved. Total uop was 300ml for the shift. Labs pending.         "

## 2023-09-18 LAB
ACT BLD: 263 SECONDS (ref 82–152)
ACT BLD: 293 SECONDS (ref 82–152)
ALBUMIN SERPL-MCNC: 2.8 G/DL (ref 3.5–5.2)
ALBUMIN/GLOB SERPL: 0.9 G/DL
ALP SERPL-CCNC: 58 U/L (ref 39–117)
ALT SERPL W P-5'-P-CCNC: 15 U/L (ref 1–33)
ANION GAP SERPL CALCULATED.3IONS-SCNC: 9 MMOL/L (ref 5–15)
AST SERPL-CCNC: 24 U/L (ref 1–32)
BILIRUB SERPL-MCNC: 0.4 MG/DL (ref 0–1.2)
BUN SERPL-MCNC: 37 MG/DL (ref 8–23)
BUN/CREAT SERPL: 19.6 (ref 7–25)
CALCIUM SPEC-SCNC: 8.7 MG/DL (ref 8.6–10.5)
CHLORIDE SERPL-SCNC: 106 MMOL/L (ref 98–107)
CO2 SERPL-SCNC: 26 MMOL/L (ref 22–29)
CREAT SERPL-MCNC: 1.89 MG/DL (ref 0.57–1)
EGFRCR SERPLBLD CKD-EPI 2021: 26.4 ML/MIN/1.73
GLOBULIN UR ELPH-MCNC: 3.2 GM/DL
GLUCOSE BLDC GLUCOMTR-MCNC: 139 MG/DL (ref 70–130)
GLUCOSE BLDC GLUCOMTR-MCNC: 155 MG/DL (ref 70–130)
GLUCOSE BLDC GLUCOMTR-MCNC: 161 MG/DL (ref 70–130)
GLUCOSE BLDC GLUCOMTR-MCNC: 280 MG/DL (ref 70–130)
GLUCOSE SERPL-MCNC: 113 MG/DL (ref 65–99)
MAGNESIUM SERPL-MCNC: 2.2 MG/DL (ref 1.6–2.4)
PHOSPHATE SERPL-MCNC: 3.2 MG/DL (ref 2.5–4.5)
POTASSIUM SERPL-SCNC: 4.6 MMOL/L (ref 3.5–5.2)
PROT SERPL-MCNC: 6 G/DL (ref 6–8.5)
QT INTERVAL: 338 MS
QT INTERVAL: 352 MS
QTC INTERVAL: 423 MS
QTC INTERVAL: 433 MS
SODIUM SERPL-SCNC: 141 MMOL/L (ref 136–145)

## 2023-09-18 PROCEDURE — 63710000001 INSULIN LISPRO (HUMAN) PER 5 UNITS: Performed by: INTERNAL MEDICINE

## 2023-09-18 PROCEDURE — 93010 ELECTROCARDIOGRAM REPORT: CPT | Performed by: INTERNAL MEDICINE

## 2023-09-18 PROCEDURE — 80053 COMPREHEN METABOLIC PANEL: CPT | Performed by: INTERNAL MEDICINE

## 2023-09-18 PROCEDURE — 82948 REAGENT STRIP/BLOOD GLUCOSE: CPT

## 2023-09-18 PROCEDURE — 25010000002 MORPHINE PER 10 MG: Performed by: INTERNAL MEDICINE

## 2023-09-18 PROCEDURE — 93005 ELECTROCARDIOGRAM TRACING: CPT | Performed by: INTERNAL MEDICINE

## 2023-09-18 PROCEDURE — 83735 ASSAY OF MAGNESIUM: CPT | Performed by: INTERNAL MEDICINE

## 2023-09-18 PROCEDURE — 99233 SBSQ HOSP IP/OBS HIGH 50: CPT | Performed by: INTERNAL MEDICINE

## 2023-09-18 PROCEDURE — 84100 ASSAY OF PHOSPHORUS: CPT | Performed by: INTERNAL MEDICINE

## 2023-09-18 PROCEDURE — 99232 SBSQ HOSP IP/OBS MODERATE 35: CPT

## 2023-09-18 RX ORDER — RANOLAZINE 500 MG/1
500 TABLET, EXTENDED RELEASE ORAL EVERY 12 HOURS SCHEDULED
Status: DISCONTINUED | OUTPATIENT
Start: 2023-09-18 | End: 2023-09-20 | Stop reason: HOSPADM

## 2023-09-18 RX ORDER — ALPRAZOLAM 0.5 MG/1
0.5 TABLET ORAL 3 TIMES DAILY PRN
Status: DISCONTINUED | OUTPATIENT
Start: 2023-09-18 | End: 2023-09-20 | Stop reason: HOSPADM

## 2023-09-18 RX ORDER — PANTOPRAZOLE SODIUM 40 MG/1
40 TABLET, DELAYED RELEASE ORAL
Status: DISCONTINUED | OUTPATIENT
Start: 2023-09-19 | End: 2023-09-20 | Stop reason: HOSPADM

## 2023-09-18 RX ADMIN — ATORVASTATIN CALCIUM 40 MG: 40 TABLET, FILM COATED ORAL at 20:00

## 2023-09-18 RX ADMIN — NITROGLYCERIN 0.4 MG: 0.4 TABLET SUBLINGUAL at 06:11

## 2023-09-18 RX ADMIN — Medication 10 ML: at 20:00

## 2023-09-18 RX ADMIN — PANTOPRAZOLE SODIUM 40 MG: 40 INJECTION, POWDER, LYOPHILIZED, FOR SOLUTION INTRAVENOUS at 05:06

## 2023-09-18 RX ADMIN — ISOSORBIDE MONONITRATE 120 MG: 60 TABLET, EXTENDED RELEASE ORAL at 08:14

## 2023-09-18 RX ADMIN — METOPROLOL TARTRATE 50 MG: 50 TABLET ORAL at 20:00

## 2023-09-18 RX ADMIN — CLOPIDOGREL BISULFATE 75 MG: 75 TABLET ORAL at 08:14

## 2023-09-18 RX ADMIN — INSULIN LISPRO 2 UNITS: 100 INJECTION, SOLUTION INTRAVENOUS; SUBCUTANEOUS at 20:29

## 2023-09-18 RX ADMIN — AMLODIPINE BESYLATE 5 MG: 5 TABLET ORAL at 08:14

## 2023-09-18 RX ADMIN — MORPHINE SULFATE 2 MG: 2 INJECTION, SOLUTION INTRAMUSCULAR; INTRAVENOUS at 07:37

## 2023-09-18 RX ADMIN — INSULIN LISPRO 4 UNITS: 100 INJECTION, SOLUTION INTRAVENOUS; SUBCUTANEOUS at 17:20

## 2023-09-18 RX ADMIN — INSULIN LISPRO 2 UNITS: 100 INJECTION, SOLUTION INTRAVENOUS; SUBCUTANEOUS at 08:14

## 2023-09-18 RX ADMIN — METOPROLOL TARTRATE 50 MG: 50 TABLET ORAL at 08:14

## 2023-09-18 RX ADMIN — MORPHINE SULFATE 2 MG: 2 INJECTION, SOLUTION INTRAMUSCULAR; INTRAVENOUS at 19:38

## 2023-09-18 RX ADMIN — SENNOSIDES AND DOCUSATE SODIUM 2 TABLET: 8.6; 5 TABLET ORAL at 20:00

## 2023-09-18 RX ADMIN — RANOLAZINE 500 MG: 500 TABLET, FILM COATED, EXTENDED RELEASE ORAL at 22:06

## 2023-09-18 RX ADMIN — ALPRAZOLAM 0.5 MG: 0.5 TABLET ORAL at 13:47

## 2023-09-18 RX ADMIN — RANOLAZINE 500 MG: 500 TABLET, FILM COATED, EXTENDED RELEASE ORAL at 13:47

## 2023-09-18 RX ADMIN — Medication 10 ML: at 08:14

## 2023-09-18 RX ADMIN — ALLOPURINOL 100 MG: 100 TABLET ORAL at 08:14

## 2023-09-18 RX ADMIN — MORPHINE SULFATE 2 MG: 2 INJECTION, SOLUTION INTRAMUSCULAR; INTRAVENOUS at 05:06

## 2023-09-18 RX ADMIN — ASPIRIN 81 MG: 81 TABLET, CHEWABLE ORAL at 08:14

## 2023-09-18 NOTE — PLAN OF CARE
Goal Outcome Evaluation:  Pt VSS on 2-3L nasal cannula  UOP minimal, 200 mL  No Bm   Pt continues to have intermittent chest pain, PRN morphine and nitro given  Xanax given for anxiety  Afebrile  Refused bath and CHG wipes  Pt two daughters at bedside and updated

## 2023-09-18 NOTE — PROGRESS NOTES
"  [facility]  PROGRESS NOTE    Date of Admission: 9/13/2023  Date of Service: 09/18/23    Primary Care Physician: Boy Esparza PA    Chief Complaint: Follow up NSTEMI     Subjective      Patient continues to have intermittent episodes of chest pain. States pain is worse with ambulation and with changing positions in bed. However, she does describe this pain as \"congestion feeling.\" No chest pain at rest.     Objective   Vitals: /77 (BP Location: Right arm, Patient Position: Lying)   Pulse 88   Temp 98.2 °F (36.8 °C) (Oral)   Resp 20   Ht 154.9 cm (61\")   Wt 76.4 kg (168 lb 6.9 oz)   SpO2 94%   BMI 31.82 kg/m²     Physical Exam:  GENERAL: in no acute distress.   HEENT: Normocephalic, no jugular venous distention  HEART: Regular rhythm, normal rate, and no murmurs, gallops, or rubs.   LUNGS: Clear to auscultation bilaterally. No wheezing, rales or rhonchi.  EXTREMITIES: No clubbing, cyanosis, or edema noted. Ecchymosis on bilateral upper extremities.     Results:  Results from last 7 days   Lab Units 09/16/23  0352 09/15/23  0416 09/13/23  2219   WBC 10*3/mm3 13.10* 14.24* 16.28*   HEMOGLOBIN g/dL 9.1* 9.5* 9.2*   HEMATOCRIT % 29.4* 31.1* 29.4*   PLATELETS 10*3/mm3 188 204 193     Results from last 7 days   Lab Units 09/18/23  0317 09/17/23  0524 09/16/23  0352   SODIUM mmol/L 141 141 140   POTASSIUM mmol/L 4.6 4.7 4.9   CHLORIDE mmol/L 106 106 107   CO2 mmol/L 26.0 26.0 21.0*   BUN mg/dL 37* 32* 29*   CREATININE mg/dL 1.89* 1.73* 1.56*   GLUCOSE mg/dL 113* 169* 108*      Lab Results   Component Value Date    CHOL 122 09/14/2023    TRIG 64 09/14/2023    HDL 67 (H) 09/14/2023    LDL 41 09/14/2023    AST 24 09/18/2023    ALT 15 09/18/2023     Results from last 7 days   Lab Units 09/14/23  0049   HEMOGLOBIN A1C % 6.20*     Results from last 7 days   Lab Units 09/14/23 0049   CHOLESTEROL mg/dL 122   TRIGLYCERIDES mg/dL 64   HDL CHOL mg/dL 67*   LDL CHOL mg/dL 41 "     Results from last 7 days   Lab Units 09/13/23  2219   TSH uIU/mL 0.515         Results from last 7 days   Lab Units 09/14/23  0344 09/13/23 2219 09/13/23  1804   PROTIME Seconds  --  15.5* 12.3   INR   --  1.22* 1.15   APTT seconds 72.7  --   --      Results from last 7 days   Lab Units 09/14/23  0049 09/13/23 2219   HSTROP T ng/L 872* 796*     Results from last 7 days   Lab Units 09/13/23 2219   PROBNP pg/mL 16,577.0*         Intake/Output Summary (Last 24 hours) at 9/18/2023 0835  Last data filed at 9/17/2023 2200  Gross per 24 hour   Intake 250 ml   Output 650 ml   Net -400 ml       I personally reviewed the patient's EKG/Telemetry data    Radiology Data:   Results for orders placed during the hospital encounter of 09/13/23    Adult Transthoracic Echo Complete W/ Cont if Necessary Per Protocol    Interpretation Summary    Left ventricular systolic function is normal. Estimated left ventricular EF = 65%    Left ventricular wall thickness is consistent with mild concentric hypertrophy.    Left ventricular diastolic function is consistent with (grade I) impaired relaxation.    The right ventricular cavity is borderline dilated.    Mild biatrial enlargement.    Aortic sclerosis with trace aortic insufficiency, no evidence of aortic stenosis.    Mild to moderate mitral regurgitation.    Mild tricuspid regurgitation with RVSP 48 mmHg.      Current Medications:  allopurinol, 100 mg, Oral, Daily  amLODIPine, 5 mg, Oral, Q24H  aspirin, 81 mg, Oral, Daily  atorvastatin, 40 mg, Oral, Nightly  clopidogrel, 75 mg, Oral, Daily  insulin lispro, 2-7 Units, Subcutaneous, 4x Daily AC & at Bedtime  isosorbide mononitrate, 120 mg, Oral, Q24H  metoprolol tartrate, 50 mg, Oral, Q12H  pantoprazole, 40 mg, Intravenous, Q AM  pharmacy consult - MTM, , Does not apply, Daily  senna-docusate sodium, 2 tablet, Oral, BID  sodium chloride, 10 mL, Intravenous, Q12H      nitroglycerin, 10-50 mcg/min, Last Rate: Stopped (09/14/23  1017)        Assessment:  Non ST Elevated MI.  [facility] 9/12/2023 at OSH with multiple, re-occlusions, no intervention  [facility] 9/15/2023: 80% ostial left main stenosis s/p OTC guided PCI with TOMAS and attempted PTCA of mid-circumflex, but would not cross.    History of CAD s/p remote CABG. Normal EF.  Hypertension.  Dyslipidemia.   Diabetes.   SILVANA on CKD, bumex discontinued over the weekend.   Anemia.     Plan:  Add Ranexa 500mg q 12 hours for additional antianginal therapy.   Continue imdur at current dose.   As needed nitropaste 1 inch q 6 hours for chest pain.   Continue to hold bumex due to increased creatinine. Monitor renal function.   Chest pain likely secondary to mid-circumflex occlusion. Continue medical management for now.   May need nephrology involvement.      Sandra Roman PA-C

## 2023-09-18 NOTE — PROGRESS NOTES
Intensive Care Follow-up     Hospital:  LOS: 5 days   Ms. Betty Rod, 82 y.o. female is followed for:   NSTEMI (non-ST elevated myocardial infarction)            History of present illness:   82-year-old female with past medical history of coronary disease s/p coronary bypass graft surgery transferred to [facility] for higher level of care.  Patient was admitted to [facility] on September 8 with non-ST elevation MI and left heart cath on September 12 showed multiple occlusions.  Patient was taken to Cath Lab and found to have left main stenosis was where she underwent PCI with drug-eluting stent.  PTCA of mid circumflex was attempted but unsuccessful.  Since the procedure patient has continued to Have chest pains.  Cardiology team following.      Subjective   Interval History:  Overnight patient continued to have recurrent chest pains.  Requiring nitroglycerin and morphine intermittently.  Has also been tried on Xanax.  Creatinine slightly worse.  Nitroglycerin drip has been stopped for few days.                 The patient's past medical, surgical and social history were reviewed and updated in Epic as appropriate.       Objective     Infusions:  nitroglycerin, 10-50 mcg/min, Last Rate: Stopped (09/14/23 1017)      Medications:  allopurinol, 100 mg, Oral, Daily  amLODIPine, 5 mg, Oral, Q24H  aspirin, 81 mg, Oral, Daily  atorvastatin, 40 mg, Oral, Nightly  clopidogrel, 75 mg, Oral, Daily  insulin lispro, 2-7 Units, Subcutaneous, 4x Daily AC & at Bedtime  isosorbide mononitrate, 120 mg, Oral, Q24H  metoprolol tartrate, 50 mg, Oral, Q12H  pantoprazole, 40 mg, Intravenous, Q AM  pharmacy consult - [facility], , Does not apply, Daily  senna-docusate sodium, 2 tablet, Oral, BID  sodium chloride, 10 mL, Intravenous, Q12H        Vital Sign Min/Max for last 24 hours  Temp  Min: 98.1 °F (36.7 °C)  Max: 98.6 °F (37 °C)   BP  Min: 99/56  Max: 146/75   Pulse  Min: 60  Max: 88   Resp  Min: 16  Max: 22   SpO2   "Min: 84 %  Max: 95 %   Flow (L/min)  Min: 2  Max: 3       Input/Output for last 24 hour shift  09/17 0701 - 09/18 0700  In: 250 [P.O.:250]  Out: 650 [Urine:650]      Objective:  Vital signs: (most recent): Blood pressure 124/67, pulse 70, temperature 98.2 °F (36.8 °C), temperature source Oral, resp. rate 20, height 154.9 cm (61\"), weight 76.4 kg (168 lb 6.9 oz), SpO2 (!) 86 %.          General Appearance: Awake, alert, in mild distress  Lungs:   B/L Breath sounds present with decreased breath sounds on bases, no wheezing heard, occ basilar crackles.   Heart: S1 and S2 present, no murmur  Abdomen: Soft, nontender, no guarding or rigidity, bowel sounds positive.  Extremities:  no cyanosis or clubbing,  trace edema, warm to touch.  Pulses: Positive and symmetric.  Neurologic:  Moving all four extremities. Good strength bilaterally.   Psychological: Normal affect, Cooperative      Results from last 7 days   Lab Units 09/16/23  0352 09/15/23  0416 09/13/23  2219   WBC 10*3/mm3 13.10* 14.24* 16.28*   HEMOGLOBIN g/dL 9.1* 9.5* 9.2*   PLATELETS 10*3/mm3 188 204 193     Results from last 7 days   Lab Units 09/18/23  0317 09/17/23  0524 09/16/23  0352   SODIUM mmol/L 141 141 140   POTASSIUM mmol/L 4.6 4.7 4.9   CO2 mmol/L 26.0 26.0 21.0*   BUN mg/dL 37* 32* 29*   CREATININE mg/dL 1.89* 1.73* 1.56*   MAGNESIUM mg/dL 2.2 2.1 2.3   PHOSPHORUS mg/dL 3.2 3.1 4.3   GLUCOSE mg/dL 113* 169* 108*     Estimated Creatinine Clearance: 21.8 mL/min (A) (by C-G formula based on SCr of 1.89 mg/dL (H)).          Images:   EKG reviewed and showed ST changes in V1 to V3 with reciprocal changes in lateral leads.  Normal sinus rhythm QTc acceptable.    I reviewed the patient's results and images.     Assessment & Plan   Impression        NSTEMI (non-ST elevated myocardial infarction)    CKD (chronic kidney disease)    Coronary artery disease    Diabetes mellitus    Emphysema of lung    Gout    Hypertension    Atelectasis    Pleural effusion    " "Acute respiratory failure    Type 1 myocardial infarction       Plan        1.  Patient with coronary disease status post successful PCI of ostial left main stenosis but unsuccessful PCI of circumflex.  Continues to have recurrent chest discomfort requiring morphine and nitroglycerin.  EKG again showed some ST changes and possible endocardial injury.  Cardiology team is following closely.  Medications are being adjusted.  We will continue to monitor for now.  2.  Continue dual antiplatelet agents.  3.  Patient complains of \"chest congestion\".  No wheezing heard today.  Continue incentive spirometry.  Will place on DuoNeb as needed.  4.  Patient was diuresed but creatinine is going up.  Will continue to hold diuresis for now.  Monitor urine output.  May need to give some fluids back.  Will also be worried about contrast nephropathy.  5.  Continue statin.  6.  Beta-blocker to continue.  Amlodipine to continue as well.  7.  GI prophylaxis.  8.  Bowel regimen.  9.  As needed Xanax for anxiety.  Looks like patient has been filling this medication pretty consistently at home.    Extensive discussion held with patient and family.  Guarded prognosis discussed.  CODE STATUS discussed as well.  After discussion patient told me and her family that she does not want to be \"tortured anymore and found to be treated humanely\" we discussed what it means for her and she told us that she would not want to be.  Placed on ventilator or undergo chest compressions in the event of cardiac arrest.  Her family corroborates that.  Patient would not want any dialysis either.  CODE STATUS changed to reflect patient's wishes in the chart.      Plan of care and goals reviewed with multidisciplinary/antibiotic stewardship team during rounds.   I discussed the patient's findings and my recommendations with patient, family, and nursing staff       Time spent  38 min  (exclusive of procedure time)  including high complexity decision making to " assess, manipulate, and support vital organ system failure in this individual who has impairment of one or more vital organ systems such that there is a high probability of imminent or life threatening deterioration in the patient’s condition.      Jesus Manuel Crabtree MD, [facility]  Pulmonary, Critical care and Sleep Medicine

## 2023-09-18 NOTE — CASE MANAGEMENT/SOCIAL WORK
Continued Stay Note  [facility]     Patient Name: Betty Rod  MRN: [id number]  Today's Date: 9/18/2023    Admit Date: 9/13/2023    Plan: Home with home health   Discharge Plan       Row Name 09/18/23 1722       Plan    Plan Home with home health    Plan Comments Spoke with Norma (daughter) via telephone. She said her mother is adamant about going home with home health. Norma would like a referral sent to [facility]. She wasn't sure if [facility] made the referral or not for [facility] and [facility] for oxygen. Attempted to [facility] already gone for the day. CM will continue to follow.    Final Discharge Disposition Code 06 - home with home health care                   Discharge Codes    No documentation.                 Expected Discharge Date and Time       Expected Discharge Date Expected Discharge Time    Sep 22, 2023               Clara Rivera RN

## 2023-09-18 NOTE — PLAN OF CARE
Goal Outcome Evaluation:      VSS. Pt on NC 2 - 4 L. Nursing communication placed for keeping  PIV per provider.  mL. No BM. Xanax given x1 and dose adjusted by MD. Morphine given x1 for chest pain. Ranexa started to help with chest pain. MD spoke with pt and family members. Code status changed post conversation to DNR/DNI, limited support. See code status in chart. Pt refused PT/OT. Pt stated she is scared to move or get out of bed because of the possible chest pain. PT/OT to revisit pt tomorrow. Family @ bedside and updated.

## 2023-09-19 LAB
ANION GAP SERPL CALCULATED.3IONS-SCNC: 10 MMOL/L (ref 5–15)
ANION GAP SERPL CALCULATED.3IONS-SCNC: 8 MMOL/L (ref 5–15)
BASOPHILS # BLD AUTO: 0.08 10*3/MM3 (ref 0–0.2)
BASOPHILS NFR BLD AUTO: 0.7 % (ref 0–1.5)
BUN SERPL-MCNC: 43 MG/DL (ref 8–23)
BUN SERPL-MCNC: 50 MG/DL (ref 8–23)
BUN/CREAT SERPL: 22.5 (ref 7–25)
BUN/CREAT SERPL: 24 (ref 7–25)
CALCIUM SPEC-SCNC: 8.8 MG/DL (ref 8.6–10.5)
CALCIUM SPEC-SCNC: 8.9 MG/DL (ref 8.6–10.5)
CHLORIDE SERPL-SCNC: 108 MMOL/L (ref 98–107)
CHLORIDE SERPL-SCNC: 108 MMOL/L (ref 98–107)
CO2 SERPL-SCNC: 24 MMOL/L (ref 22–29)
CO2 SERPL-SCNC: 25 MMOL/L (ref 22–29)
CREAT SERPL-MCNC: 1.91 MG/DL (ref 0.57–1)
CREAT SERPL-MCNC: 2.08 MG/DL (ref 0.57–1)
DEPRECATED RDW RBC AUTO: 59.7 FL (ref 37–54)
EGFRCR SERPLBLD CKD-EPI 2021: 23.6 ML/MIN/1.73
EGFRCR SERPLBLD CKD-EPI 2021: 26.1 ML/MIN/1.73
EOSINOPHIL # BLD AUTO: 0.28 10*3/MM3 (ref 0–0.4)
EOSINOPHIL NFR BLD AUTO: 2.4 % (ref 0.3–6.2)
ERYTHROCYTE [DISTWIDTH] IN BLOOD BY AUTOMATED COUNT: 15.8 % (ref 12.3–15.4)
GLUCOSE BLDC GLUCOMTR-MCNC: 160 MG/DL (ref 70–130)
GLUCOSE BLDC GLUCOMTR-MCNC: 166 MG/DL (ref 70–130)
GLUCOSE BLDC GLUCOMTR-MCNC: 187 MG/DL (ref 70–130)
GLUCOSE BLDC GLUCOMTR-MCNC: 208 MG/DL (ref 70–130)
GLUCOSE SERPL-MCNC: 150 MG/DL (ref 65–99)
GLUCOSE SERPL-MCNC: 175 MG/DL (ref 65–99)
HCT VFR BLD AUTO: 28.9 % (ref 34–46.6)
HGB BLD-MCNC: 8.8 G/DL (ref 12–15.9)
IMM GRANULOCYTES # BLD AUTO: 0.1 10*3/MM3 (ref 0–0.05)
IMM GRANULOCYTES NFR BLD AUTO: 0.9 % (ref 0–0.5)
LYMPHOCYTES # BLD AUTO: 1.36 10*3/MM3 (ref 0.7–3.1)
LYMPHOCYTES NFR BLD AUTO: 11.8 % (ref 19.6–45.3)
MAGNESIUM SERPL-MCNC: 2.4 MG/DL (ref 1.6–2.4)
MCH RBC QN AUTO: 31.5 PG (ref 26.6–33)
MCHC RBC AUTO-ENTMCNC: 30.4 G/DL (ref 31.5–35.7)
MCV RBC AUTO: 103.6 FL (ref 79–97)
MONOCYTES # BLD AUTO: 1.2 10*3/MM3 (ref 0.1–0.9)
MONOCYTES NFR BLD AUTO: 10.4 % (ref 5–12)
NEUTROPHILS NFR BLD AUTO: 73.8 % (ref 42.7–76)
NEUTROPHILS NFR BLD AUTO: 8.51 10*3/MM3 (ref 1.7–7)
NRBC BLD AUTO-RTO: 0.2 /100 WBC (ref 0–0.2)
PLATELET # BLD AUTO: 211 10*3/MM3 (ref 140–450)
PMV BLD AUTO: 10 FL (ref 6–12)
POTASSIUM SERPL-SCNC: 4.5 MMOL/L (ref 3.5–5.2)
POTASSIUM SERPL-SCNC: 5.3 MMOL/L (ref 3.5–5.2)
POTASSIUM SERPL-SCNC: 5.5 MMOL/L (ref 3.5–5.2)
RBC # BLD AUTO: 2.79 10*6/MM3 (ref 3.77–5.28)
SODIUM SERPL-SCNC: 141 MMOL/L (ref 136–145)
SODIUM SERPL-SCNC: 142 MMOL/L (ref 136–145)
WBC NRBC COR # BLD: 11.53 10*3/MM3 (ref 3.4–10.8)

## 2023-09-19 PROCEDURE — 25010000002 MORPHINE PER 10 MG: Performed by: INTERNAL MEDICINE

## 2023-09-19 PROCEDURE — 97530 THERAPEUTIC ACTIVITIES: CPT

## 2023-09-19 PROCEDURE — 82948 REAGENT STRIP/BLOOD GLUCOSE: CPT

## 2023-09-19 PROCEDURE — 80048 BASIC METABOLIC PNL TOTAL CA: CPT

## 2023-09-19 PROCEDURE — 85025 COMPLETE CBC W/AUTO DIFF WBC: CPT | Performed by: INTERNAL MEDICINE

## 2023-09-19 PROCEDURE — 84132 ASSAY OF SERUM POTASSIUM: CPT | Performed by: INTERNAL MEDICINE

## 2023-09-19 PROCEDURE — 80048 BASIC METABOLIC PNL TOTAL CA: CPT | Performed by: INTERNAL MEDICINE

## 2023-09-19 PROCEDURE — 94640 AIRWAY INHALATION TREATMENT: CPT

## 2023-09-19 PROCEDURE — 94799 UNLISTED PULMONARY SVC/PX: CPT

## 2023-09-19 PROCEDURE — 99232 SBSQ HOSP IP/OBS MODERATE 35: CPT | Performed by: INTERNAL MEDICINE

## 2023-09-19 PROCEDURE — 63710000001 INSULIN LISPRO (HUMAN) PER 5 UNITS: Performed by: INTERNAL MEDICINE

## 2023-09-19 PROCEDURE — 83735 ASSAY OF MAGNESIUM: CPT | Performed by: INTERNAL MEDICINE

## 2023-09-19 RX ORDER — HYDROCODONE BITARTRATE AND ACETAMINOPHEN 7.5; 325 MG/1; MG/1
1 TABLET ORAL 3 TIMES DAILY PRN
Status: DISCONTINUED | OUTPATIENT
Start: 2023-09-19 | End: 2023-09-20 | Stop reason: HOSPADM

## 2023-09-19 RX ORDER — SODIUM CHLORIDE 9 MG/ML
75 INJECTION, SOLUTION INTRAVENOUS CONTINUOUS
Status: DISCONTINUED | OUTPATIENT
Start: 2023-09-19 | End: 2023-09-20

## 2023-09-19 RX ORDER — METOPROLOL TARTRATE 100 MG/1
100 TABLET ORAL EVERY 12 HOURS SCHEDULED
Status: DISCONTINUED | OUTPATIENT
Start: 2023-09-19 | End: 2023-09-20 | Stop reason: HOSPADM

## 2023-09-19 RX ADMIN — Medication 10 ML: at 20:21

## 2023-09-19 RX ADMIN — SODIUM ZIRCONIUM CYCLOSILICATE 10 G: 10 POWDER, FOR SUSPENSION ORAL at 08:47

## 2023-09-19 RX ADMIN — RANOLAZINE 500 MG: 500 TABLET, FILM COATED, EXTENDED RELEASE ORAL at 08:46

## 2023-09-19 RX ADMIN — CLOPIDOGREL BISULFATE 75 MG: 75 TABLET ORAL at 08:47

## 2023-09-19 RX ADMIN — IPRATROPIUM BROMIDE AND ALBUTEROL SULFATE 3 ML: 2.5; .5 SOLUTION RESPIRATORY (INHALATION) at 05:39

## 2023-09-19 RX ADMIN — IPRATROPIUM BROMIDE AND ALBUTEROL SULFATE 3 ML: 2.5; .5 SOLUTION RESPIRATORY (INHALATION) at 20:38

## 2023-09-19 RX ADMIN — SODIUM ZIRCONIUM CYCLOSILICATE 10 G: 10 POWDER, FOR SUSPENSION ORAL at 05:12

## 2023-09-19 RX ADMIN — Medication 10 ML: at 08:48

## 2023-09-19 RX ADMIN — SENNOSIDES AND DOCUSATE SODIUM 2 TABLET: 8.6; 5 TABLET ORAL at 08:47

## 2023-09-19 RX ADMIN — ATORVASTATIN CALCIUM 40 MG: 40 TABLET, FILM COATED ORAL at 20:01

## 2023-09-19 RX ADMIN — ISOSORBIDE MONONITRATE 120 MG: 60 TABLET, EXTENDED RELEASE ORAL at 08:46

## 2023-09-19 RX ADMIN — INSULIN LISPRO 2 UNITS: 100 INJECTION, SOLUTION INTRAVENOUS; SUBCUTANEOUS at 08:48

## 2023-09-19 RX ADMIN — METOPROLOL TARTRATE 100 MG: 100 TABLET, FILM COATED ORAL at 08:59

## 2023-09-19 RX ADMIN — HYDROCODONE BITARTRATE AND ACETAMINOPHEN 1 TABLET: 7.5; 325 TABLET ORAL at 09:42

## 2023-09-19 RX ADMIN — SODIUM ZIRCONIUM CYCLOSILICATE 10 G: 10 POWDER, FOR SUSPENSION ORAL at 20:02

## 2023-09-19 RX ADMIN — METOPROLOL TARTRATE 100 MG: 100 TABLET, FILM COATED ORAL at 20:02

## 2023-09-19 RX ADMIN — AMLODIPINE BESYLATE 5 MG: 5 TABLET ORAL at 08:46

## 2023-09-19 RX ADMIN — PANTOPRAZOLE SODIUM 40 MG: 40 TABLET, DELAYED RELEASE ORAL at 05:12

## 2023-09-19 RX ADMIN — INSULIN LISPRO 2 UNITS: 100 INJECTION, SOLUTION INTRAVENOUS; SUBCUTANEOUS at 17:12

## 2023-09-19 RX ADMIN — MORPHINE SULFATE 2 MG: 2 INJECTION, SOLUTION INTRAMUSCULAR; INTRAVENOUS at 22:16

## 2023-09-19 RX ADMIN — ASPIRIN 81 MG: 81 TABLET, CHEWABLE ORAL at 08:46

## 2023-09-19 RX ADMIN — SODIUM CHLORIDE 75 ML/HR: 9 INJECTION, SOLUTION INTRAVENOUS at 17:13

## 2023-09-19 RX ADMIN — INSULIN LISPRO 3 UNITS: 100 INJECTION, SOLUTION INTRAVENOUS; SUBCUTANEOUS at 12:00

## 2023-09-19 RX ADMIN — RANOLAZINE 500 MG: 500 TABLET, FILM COATED, EXTENDED RELEASE ORAL at 22:09

## 2023-09-19 RX ADMIN — ALPRAZOLAM 0.5 MG: 0.5 TABLET ORAL at 08:59

## 2023-09-19 RX ADMIN — INSULIN LISPRO 2 UNITS: 100 INJECTION, SOLUTION INTRAVENOUS; SUBCUTANEOUS at 20:20

## 2023-09-19 RX ADMIN — ALLOPURINOL 100 MG: 100 TABLET ORAL at 08:47

## 2023-09-19 RX ADMIN — SENNOSIDES AND DOCUSATE SODIUM 2 TABLET: 8.6; 5 TABLET ORAL at 20:02

## 2023-09-19 RX ADMIN — ALPRAZOLAM 0.5 MG: 0.5 TABLET ORAL at 23:33

## 2023-09-19 NOTE — DISCHARGE PLACEMENT REQUEST
"Betty Bernard (82 y.o. Female)       Date of Birth   1940    Social Security Number       Address   [address] [facility] [address]    Home Phone   [phone]    MRN   [id number]       Faith   Episcopalian    Marital Status                               Admission Date   9/13/23    Admission Type   Urgent    Admitting Provider   Case, Eliane ROBBINS DO    Attending Provider   Case, Eliane ROBBINS DO    Department, Room/Bed   [facility] 2A ICU, N217/1       Discharge Date       Discharge Disposition       Discharge Destination                                 Attending Provider: Jose, Eliane ROBBINS DO    Allergies: Amoxicillin    Isolation: None   Infection: None   Code Status: No CPR    Ht: 154.9 cm (61\")   Wt: 77.4 kg (170 lb 10.2 oz)    Admission Cmt: None   Principal Problem: NSTEMI (non-ST elevated myocardial infarction) [I21.4]                   Active Insurance as of 9/13/2023       Primary Coverage       Payor Plan Insurance Group Employer/Plan Group    HUMANA MEDICARE REPLACEMENT HUMANA MEDICARE REPLACEMENT [id number]       Payor Plan Address Payor Plan Phone Number Payor Plan Fax Number Effective Dates    [address] [address] [phone]  1/1/2018 - None Entered    Formerly [facility] [id number]         Subscriber Name Subscriber Birth Date Member ID       BETTY BERNARD 1940 [id number]                     Emergency Contacts        (Rel.) Home Phone Work Phone Mobile Phone    Yasmin Huff (Daughter) [phone] -- --    Norma Winn (Daughter) [phone] -- --              Insurance Information                  HUMANA MEDICARE REPLACEMENT/HUMANA MEDICARE REPLACEMENT Phone: [phone]    Subscriber: Betty Bernard Subscriber#: [id number]    Group#: [id number] Precert#: [id number]             History & Physical        Case, Eliane ROBBNIS DO at 09/13/23 2010            INTENSIVIST   INITIAL HOSPITAL VISIT NOTE     Hospital:  LOS: 0 days     Ms. Betty Bernard, 82 y.o. female is seen for a " "Chief Complaint of:      NSTEMI (non-ST elevated myocardial infarction)    CKD (chronic kidney disease)    Coronary artery disease    Diabetes mellitus    Emphysema of lung    Gout    Hypertension      Subjective  S     Betty Rod is a 82 y.o. female who has a PMHX of CAD (s/p CABG) who was transferred to our facility for work up for potential high-risk PCI.      Patient was admitted to [facility] on 9/8 from [facility] w/ NSTEMI with a LHC on 9/12 which showed multiple re-occlusions.  No intervention was performed.  She developed increased CP and troponin elevation on 9/13 and so was transferred to our facility for higher level of care.    Records available from [facility] are only up to 9/9.  They indiate that she may have had a UTI upon admission and was started on Rocephin.  There is mention of a planned CT of the chest due to \"paratracheal fullness\".  We do not have the results of these tests.  We will contact them to obtain full records.      4 minutes of critical care provided by DANNY Jones in addendum accordance with split/shared billing. This time excludes other billable procedures. Time does include preparation of documents, medical consultations, review of old records, and direct bedside care. Patient is at high risk for life-threatening deterioration due to NSTEMI.   Electronically signed by DANNY Wise, 09/13/23, 8:10 PM EDT.          PMH: She  has a past medical history of Anxiety, CKD (chronic kidney disease), Coronary artery disease, Diabetes mellitus, Emphysema of lung, Gout, Hypertension, and Osteoarthritis.   PSxH: She  has a past surgical history that includes Coronary artery bypass graft.      Medications:  No current facility-administered medications on file prior to encounter.     No current outpatient medications on file prior to encounter.       Allergies: She is allergic to amoxicillin.   FH: Her family history includes Cancer in her brother, mother, and " sister; Heart failure in her father and mother.   SH: She  reports that she quit smoking about 23 years ago. Her smoking use included cigarettes. She does not have any smokeless tobacco history on file. She reports that she does not drink alcohol and does not use drugs.     The patient's relevant past medical, surgical and social history were reviewed and updated in Epic as appropriate.     ROS (14):   Review of Systems   Constitutional:  Positive for diaphoresis.   Respiratory:  Positive for shortness of breath.    Cardiovascular:  Positive for chest pain.   Gastrointestinal:  Positive for nausea and vomiting.     Objective  O     Vitals    Temp  Min: 98.4 °F (36.9 °C)  Max: 98.4 °F (36.9 °C)  BP  Min: 128/61  Max: 128/61  Pulse  Min: 80  Max: 80  No data recorded  SpO2  Min: 95 %  Max: 95 % No data recorded     I/O 24 hrs (7:00AM - 6:59 AM)  Intake/Output       None            Medications (drips):      Physical Examination    Telemetry: Normal sinus rhythm.    Constitutional:  No acute distress.  Conversant. Sitting up in bed on nasal cannula.    HEENT: Normocephalic and atraumatic.   Neck:  Normal range of motion. Neck supple.   No JVD present.   No thyromegaly.    Cardiovascular: Regular rate and rhythm.  No murmurs, rub or gallop.  No peripheral edema.   Respiratory: Normal respiratory effort.  Diminished bilaterally. No wheezing.    Abdominal:  Soft. No masses.   Non-tender. No distension.   No hepatosplenomegaly.   MSK: Normal muscle strength and tone.   Extremities: No digital cyanosis or clubbing.   Skin: Skin is warm and dry.  No rashes, lesions or ulcers noted.    Neurological:   Alert and interactive though slow to respond.   Moves all extremities.   Psychiatric:  Normal affect.   Normal judgment.           Results from last 7 days   Lab Units 09/13/23 2219   WBC 10*3/mm3 16.28*   HEMOGLOBIN g/dL 9.2*   MCV fL 101.0*   PLATELETS 10*3/mm3 193     Results from last 7 days   Lab Units 09/13/23 2219    SODIUM mmol/L 141   POTASSIUM mmol/L 4.6   CO2 mmol/L 24.0   CREATININE mg/dL 1.65*   MAGNESIUM mg/dL 1.9   PHOSPHORUS mg/dL 2.5     CrCl cannot be calculated (Unknown ideal weight.).  Results from last 7 days   Lab Units 09/13/23  2219   ALK PHOS U/L 63   BILIRUBIN mg/dL 0.3   ALT (SGPT) U/L 10   AST (SGOT) U/L 18               Images:    Imaging Results (Last 24 Hours)       ** No results found for the last 24 hours. **          ECG/EMG Results (last 24 hours)       ** No results found for the last 24 hours. **            I reviewed the patient's new laboratory and imaging results.  I independently reviewed the patient's new images.    Assessment & Plan  A / P     Ms. Rod is a 81yo F with a history of CAD s/p CABG, CKD, and gout who was hospitalized at [facility] on 9/8/23 for NSTEMI. She was noted to have elevated creatinine and LHC was delayed until 9/12/13. LHC reportedly showed multiple re-occlusions and no intervention was performed. We do not have these records as the records that arrived with the patient are only through 9/9.     She developed recurrent chest pain on 9/13/23 and transfer was requested to [facility] for review by our Interventional Cardiologists.     Upon arrival, she is currently chest pain free.       Nutrition:   NPO Diet NPO Type: Strict NPO  Advance Directives:   Code Status and Medical Interventions:   Ordered at: 09/13/23 2204     Code Status (Patient has no pulse and is not breathing):    CPR (Attempt to Resuscitate)     Medical Interventions (Patient has pulse or is breathing):    Full Support         NSTEMI (non-ST elevated myocardial infarction)    CKD (chronic kidney disease)    Coronary artery disease    Diabetes mellitus    Emphysema of lung    Gout    Hypertension      Assessment / Plan:    Admit to ICU  EKG now.   Continue heparin infusion.  Check labs  Nitro paste for recurrent chest pain as there is a national shortage of IV Nitroglycerin. Will plan to start a drip if she  develops chest pain not responsive to nitro-paste.   Cardiology to see in the AM  Echocardiogram  AM labs    High risk secondary to ongoing going chest pain in the setting of CAD.     Plan of care and goals reviewed during interdisciplinary rounds.  I discussed the patient's findings and my recommendations with patient and nursing staff      Eliane Garcia DO  Pulmonary and Critical Care Medicine              Electronically signed by Eliane Garcia DO at 23 2248          Physical Therapy Notes (last 48 hours)        Yaquelin Person, PT at 23 1020  Version 1 of 1         Goal Outcome Evaluation:  Plan of Care Reviewed With: patient, family        Progress: improving  Outcome Evaluation: Pt increased ambulation distance, but showed worsening balance and endurance, ambulating 80ft with RWx and min Ax2 with multiple LOB noted. Limited by decreased functional endurance, balance deficit, and generalized weakness compared to baseline. d/c rec for SNF.      Anticipated Discharge Disposition (PT): skilled nursing facility    Electronically signed by Yaquelin Person, PT at 23 1348       Yaquelin Person, PT at 23 1020  Version 1 of 1         Patient Name: Betty Rod  : 1940    MRN: [id number]                              Today's Date: 2023       Admit Date: 2023    Visit Dx:     ICD-10-CM ICD-9-CM   1. NSTEMI (non-ST elevated myocardial infarction)  I21.4 410.70     Patient Active Problem List   Diagnosis    NSTEMI (non-ST elevated myocardial infarction)    CKD (chronic kidney disease)    Coronary artery disease    Diabetes mellitus    Emphysema of lung    Gout    Hypertension    Atelectasis    Pleural effusion    Acute respiratory failure    Type 1 myocardial infarction     Past Medical History:   Diagnosis Date    Anxiety     CKD (chronic kidney disease)     Coronary artery disease     Diabetes mellitus     Emphysema of lung     Gout     Hypertension     Osteoarthritis      Past  Surgical History:   Procedure Laterality Date    CARDIAC SURGERY      CORONARY ARTERY BYPASS GRAFT      GALLBLADDER SURGERY        General Information       Row Name 09/19/23 1153          Physical Therapy Time and Intention    Document Type therapy note (daily note)  -AY     Mode of Treatment physical therapy;co-treatment  -AY       Row Name 09/19/23 1153          General Information    Patient Profile Reviewed yes  -AY     Existing Precautions/Restrictions fall;cardiac;oxygen therapy device and L/min  -AY     Barriers to Rehab medically complex;previous functional deficit  -AY       Row Name 09/19/23 1153          Cognition    Orientation Status (Cognition) oriented x 3  -AY       Row Name 09/19/23 1153          Safety Issues, Functional Mobility    Impairments Affecting Function (Mobility) balance;endurance/activity tolerance;strength;shortness of breath  -AY               User Key  (r) = Recorded By, (t) = Taken By, (c) = Cosigned By      Initials Name Provider Type    AY Yaquelin Person, BOOM Physical Therapist                   Mobility       Row Name 09/19/23 1153          Bed Mobility    Bed Mobility supine-sit  -AY     Supine-Sit [place] (Bed Mobility) moderate assist (50% patient effort);2 person assist;verbal cues  -AY     Assistive Device (Bed Mobility) bed rails;draw sheet;head of bed elevated  -AY     Comment, (Bed Mobility) increased time required for bed mobility with multiples cues for hand placement and safety awareness.  -AY       Row Name 09/19/23 1153          Sit-Stand Transfer    Sit-Stand [place] (Transfers) minimum assist (75% patient effort);2 person assist;verbal cues  -AY     Assistive Device (Sit-Stand Transfers) walker, front-wheeled  -AY       Row Name 09/19/23 1153          Gait/Stairs (Locomotion)    [place] Level (Gait) minimum assist (75% patient effort);2 person assist;verbal cues  -AY     Assistive Device (Gait) walker, front-wheeled  -AY     Distance in Feet (Gait) 80   -AY     Deviations/Abnormal Patterns (Gait) da decreased;festinating/shuffling;gait speed decreased;bilateral deviations;stride length decreased;weight shifting decreased;base of support, narrow  -AY     Bilateral Gait Deviations heel strike decreased;forward flexed posture  -AY     Comment, (Gait/Stairs) cueing for posture, increased stride length, and walker positioning. manual assist required for walker management. multiple LOB noted, requiring min A to correct. Gait distance limited by worsening balance and CUEVA.  -AY               User Key  (r) = Recorded By, (t) = Taken By, (c) = Cosigned By      Initials Name Provider Type    Yaquelin Crews, BOOM Physical Therapist                   Obj/Interventions       Row Name 09/19/23 1159          Balance    Balance Assessment sitting static balance;sit to stand dynamic balance;sitting dynamic balance;standing static balance;standing dynamic balance  -AY     Static Sitting Balance contact guard  -AY     Dynamic Sitting Balance contact guard  -AY     Position, Sitting Balance unsupported;sitting edge of bed  -AY     Sit to Stand Dynamic Balance minimal assist  -AY     Static Standing Balance minimal assist  -AY     Dynamic Standing Balance minimal assist  -AY     Position/Device Used, Standing Balance supported;walker, rolling  -AY               User Key  (r) = Recorded By, (t) = Taken By, (c) = Cosigned By      Initials Name Provider Type    AY Yaquelin Person, PT Physical Therapist                   Goals/Plan    No documentation.                  Clinical Impression       Row Name 09/19/23 1738          Pain    Pretreatment Pain Rating 0/10 - no pain  -AY     Posttreatment Pain Rating 0/10 - no pain  -AY     Pain Intervention(s) Ambulation/increased activity;Repositioned  -AY     Additional Documentation Pain Scale: Numbers Pre/Post-Treatment (Group)  -AY       Row Name 09/19/23 7899          Plan of Care Review    Plan of Care Reviewed With patient;family  -AY      Progress improving  -AY     Outcome Evaluation Pt increased ambulation distance, but showed worsening balance and endurance, ambulating 80ft with RWx and min Ax2 with multiple LOB noted. Limited by decreased functional endurance, balance deficit, and generalized weakness compared to baseline. d/c rec for SNF.  -AY       Row Name 09/19/23 1159          Vital Signs    Pretreatment Heart Rate (beats/min) 52  -AY     Posttreatment Heart Rate (beats/min) 61  -AY     Pre SpO2 (%) 94  -AY     Post SpO2 (%) 95  -AY     Pre Patient Position Supine  -AY     Intra Patient Position Standing  -AY     Post Patient Position Sitting  -AY       Row Name 09/19/23 1159          Positioning and Restraints    Pre-Treatment Position in bed  -AY     Post Treatment Position chair  -AY     In Chair notified nsg;reclined;sitting;call light within reach;encouraged to call for assist;exit alarm on;with family/caregiver;legs elevated;waffle cushion  -AY               User Key  (r) = Recorded By, (t) = Taken By, (c) = Cosigned By      Initials Name Provider Type    AY Yaquelin Person, PT Physical Therapist                   Outcome Measures       Row Name 09/19/23 1348          How much help from another person do you currently need...    Turning from your back to your side while in flat bed without using bedrails? 3  -AY     Moving from lying on back to sitting on the side of a flat bed without bedrails? 2  -AY     Moving to and from a bed to a chair (including a wheelchair)? 3  -AY     Standing up from a chair using your arms (e.g., wheelchair, bedside chair)? 3  -AY     Climbing 3-5 steps with a railing? 2  -AY     To walk in hospital room? 3  -AY     AM-PAC 6 Clicks Score (PT) 16  -AY     Highest level of mobility 5 --> Static standing  -AY       Row Name 09/19/23 1348 09/19/23 1155       Functional Assessment    Outcome Measure Options AM-PAC 6 Clicks Basic Mobility (PT)  -AY AM-PAC 6 Clicks Daily Activity (OT)  -CS              User Key   (r) = Recorded By, (t) = Taken By, (c) = Cosigned By      Initials Name Provider Type    CS Hawa Morales, MARICARMEN Occupational Therapist    Yaquelin Crews, BOOM Physical Therapist                                 Physical Therapy Education       Title: PT OT SLP Therapies (In Progress)       Topic: Physical Therapy (In Progress)       Point: Mobility training (In Progress)       Learning Progress Summary             Patient Acceptance, E,TB, NR by AY at 9/19/2023 1348    Acceptance, E,TB, NR by AY at 9/15/2023 1202                         Point: Home exercise program (Not Started)       Learner Progress:  Not documented in this visit.              Point: Body mechanics (In Progress)       Learning Progress Summary             Patient Acceptance, E,TB, NR by AY at 9/19/2023 1348    Acceptance, E,TB, NR by AY at 9/15/2023 1202                         Point: Precautions (In Progress)       Learning Progress Summary             Patient Acceptance, E,TB, NR by AY at 9/19/2023 1348    Acceptance, E,TB, NR by AY at 9/15/2023 1202                                         User Key       Initials Effective Dates Name Provider Type Discipline     11/10/20 -  Yaquelin Person, BOOM Physical Therapist PT                  PT Recommendation and Plan  Planned Therapy Interventions (PT): balance training, bed mobility training, home exercise program, gait training, postural re-education, transfer training, patient/family education, neuromuscular re-education, strengthening  Plan of Care Reviewed With: patient, family  Progress: improving  Outcome Evaluation: Pt increased ambulation distance, but showed worsening balance and endurance, ambulating 80ft with RWx and min Ax2 with multiple LOB noted. Limited by decreased functional endurance, balance deficit, and generalized weakness compared to baseline. d/c rec for SNF.     Time Calculation:   PT Evaluation Complexity  History, PT Evaluation Complexity: 1-2 personal factors and/or  comorbidities  Examination of Body Systems (PT Eval Complexity): total of 3 or more elements  Clinical Presentation (PT Evaluation Complexity): evolving  Clinical Decision Making (PT Evaluation Complexity): moderate complexity  Overall Complexity (PT Evaluation Complexity): moderate complexity     PT Charges       Row Name 23 1348             Time Calculation    Start Time 1020  -AY      PT Received On 23  -AY         Timed Charges    37450 - PT Therapeutic Activity Minutes 10  -AY         Total Minutes    Timed Charges Total Minutes 10  -AY       Total Minutes 10  -AY                User Key  (r) = Recorded By, (t) = Taken By, (c) = Cosigned By      Initials Name Provider Type    AY Yaquelin Person, PT Physical Therapist                  Therapy Charges for Today       Code Description Service Date Service Provider Modifiers Qty    94334598793 HC PT THERAPEUTIC ACT EA 15 MIN 2023 Yaquelin Person, PT GP 1            PT G-Codes  Outcome Measure Options: AM-PAC 6 Clicks Basic Mobility (PT)  AM-PAC 6 Clicks Score (PT): 16  AM-PAC 6 Clicks Score (OT): 11  PT Discharge Summary  Anticipated Discharge Disposition (PT): skilled nursing facility    Yaquelin Person PT  2023      Electronically signed by Yaquelin Person, PT at 23 1349          Occupational Therapy Notes (last 48 hours)        Hawa Morales, OT at 23 1001          Patient Name: Betty Rod  : 1940    MRN: [id number]                              Today's Date: 2023       Admit Date: 2023    Visit Dx:     ICD-10-CM ICD-9-CM   1. NSTEMI (non-ST elevated myocardial infarction)  I21.4 410.70     Patient Active Problem List   Diagnosis    NSTEMI (non-ST elevated myocardial infarction)    CKD (chronic kidney disease)    Coronary artery disease    Diabetes mellitus    Emphysema of lung    Gout    Hypertension    Atelectasis    Pleural effusion    Acute respiratory failure    Type 1 myocardial infarction     Past Medical  History:   Diagnosis Date    Anxiety     CKD (chronic kidney disease)     Coronary artery disease     Diabetes mellitus     Emphysema of lung     Gout     Hypertension     Osteoarthritis      Past Surgical History:   Procedure Laterality Date    CARDIAC SURGERY      CORONARY ARTERY BYPASS GRAFT      GALLBLADDER SURGERY        General Information       Row Name 09/19/23 1140          OT Time and Intention    Document Type therapy note (daily note)  -CS     Mode of Treatment occupational therapy;co-treatment  -       Row Name 09/19/23 1140          General Information    Existing Precautions/Restrictions fall;cardiac;oxygen therapy device and L/min  -CS     Barriers to Rehab medically complex;previous functional deficit  -CS       Row Name 09/19/23 1140          Cognition    Orientation Status (Cognition) oriented x 3  -CS       Row Name 09/19/23 1140          Safety Issues, Functional Mobility    Impairments Affecting Function (Mobility) balance;endurance/activity tolerance;strength;shortness of breath  -CS               User Key  (r) = Recorded By, (t) = Taken By, (c) = Cosigned By      Initials Name Provider Type    CS Hawa Morales, OT Occupational Therapist                     Mobility/ADL's       Row Name 09/19/23 1141          Bed Mobility    Bed Mobility supine-sit  -     Supine-Sit [place] (Bed Mobility) moderate assist (50% patient effort);2 person assist;verbal cues  -CS     Assistive Device (Bed Mobility) bed rails;draw sheet;head of bed elevated  -       Row Name 09/19/23 1141          Transfers    Transfers sit-stand transfer;stand-sit transfer  -       Row Name 09/19/23 1141          Sit-Stand Transfer    Sit-Stand [place] (Transfers) minimum assist (75% patient effort);2 person assist;verbal cues  -CS     Assistive Device (Sit-Stand Transfers) walker, front-wheeled  -       Row Name 09/19/23 1141          Stand-Sit Transfer    Stand-Sit [place] (Transfers) minimum assist (75%  patient effort);2 person assist;verbal cues  -     Assistive Device (Stand-Sit Transfers) walker, front-wheeled  -       Row Name 09/19/23 1141          Functional Mobility    Functional Mobility- Ind. Level minimum assist (75% patient effort);2 person assist required;verbal cues required  -     Functional Mobility- Device walker, front-wheeled  -     Functional Mobility-Distance (Feet) --  >household distance  -CS     Functional Mobility- Comment Pt required constant assist for RW management for safety, frequent episodes of LOB  -       Row Name 09/19/23 1141          Activities of Daily Living    BADL Assessment/Intervention lower body dressing;feeding  -[facility] Name 09/19/23 1141          Lower Body Dressing Assessment/Training    [place] Level (Lower Body Dressing) don;socks;dependent (less than 25% patient effort)  -     Position (Lower Body Dressing) supine  -[facility] Name 09/19/23 1141          Self-Feeding Assessment/Training    [place] Level (Feeding) liquids to mouth;set up  -     Position (Self-Feeding) sitting up in bed  -               User Key  (r) = Recorded By, (t) = Taken By, (c) = Cosigned By      Initials Name Provider Type    CS Hawa Morales OT Occupational Therapist                   Obj/Interventions       [facility] Name 09/19/23 1143          Balance    Balance Assessment sitting static balance;sitting dynamic balance;standing static balance;standing dynamic balance  -     Static Sitting Balance contact guard  -     Dynamic Sitting Balance contact guard  -CS     Position, Sitting Balance sitting edge of bed  -     Static Standing Balance minimal assist  -     Dynamic Standing Balance minimal assist  -CS     Position/Device Used, Standing Balance supported;walker, rolling  -CS     Balance Interventions sitting;standing;static;dynamic;dynamic reaching;occupation based/functional task;weight shifting activity  -               User Key  (r) = Recorded By,  (t) = Taken By, (c) = Cosigned By      Initials Name Provider Type    Hawa Hastings OT Occupational Therapist                   Goals/Plan    No documentation.                  Clinical Impression       Row Name 09/19/23 1144          Pain Assessment    Pretreatment Pain Rating 0/10 - no pain  -CS     Posttreatment Pain Rating 0/10 - no pain  -CS       Row Name 09/19/23 1144          Plan of Care Review    Plan of Care Reviewed With patient;family  -CS     Progress improving  -CS     Outcome Evaluation Pt required increased assist of Mod Ax2 for bed mobility and Dep for LBD tasks. Pt limited d/t deficits in balance, strength, and functional endurance. Recommend cont skilled IPOT POC to promote return to PLOF. Recommend pt DC to SNF.  -CS       Row Name 09/19/23 1144          Therapy Assessment/Plan (OT)    Patient/Family Therapy Goal Statement (OT) Return to PLOF  -CS       Row Name 09/19/23 1144          Therapy Plan Review/Discharge Plan (OT)    Anticipated Discharge Disposition (OT) skilled nursing facility  -       Row Name 09/19/23 1144          Vital Signs    Post Systolic BP Rehab 110  -CS     Post Treatment Diastolic BP 60  -CS     Pretreatment Heart Rate (beats/min) 50  -CS     Posttreatment Heart Rate (beats/min) 58  -CS     Pre SpO2 (%) 95  -CS     O2 Delivery Pre Treatment nasal cannula  -CS     Post SpO2 (%) 95  -CS     O2 Delivery Post Treatment nasal cannula  -CS     Pre Patient Position Supine  -CS     Intra Patient Position Standing  -CS     Post Patient Position Sitting  -CS       Row Name 09/19/23 1144          Positioning and Restraints    Pre-Treatment Position in bed  -CS     Post Treatment Position chair  -CS     Bathroom notified nsg;reclined;call light within reach;exit alarm on;encouraged to call for assist;RUE elevated;LUE elevated;waffle cushion;with family/caregiver  -CS               User Key  (r) = Recorded By, (t) = Taken By, (c) = Cosigned By      Initials Name Provider Type     Hawa Hastings OT Occupational Therapist                   Outcome Measures       Row Name 09/19/23 1155          How much help from another is currently needed...    Putting on and taking off regular lower body clothing? 1  -CS     Bathing (including washing, rinsing, and drying) 2  -CS     Toileting (which includes using toilet bed pan or urinal) 1  -CS     Putting on and taking off regular upper body clothing 2  -CS     Taking care of personal grooming (such as brushing teeth) 2  -CS     Eating meals 3  -CS     AM-PAC 6 Clicks Score (OT) 11  -CS       Row Name 09/19/23 1155          Functional Assessment    Outcome Measure Options AM-PAC 6 Clicks Daily Activity (OT)  -CS               User Key  (r) = Recorded By, (t) = Taken By, (c) = Cosigned By      Initials Name Provider Type    Hawa Hastings OT Occupational Therapist                    Occupational Therapy Education       Title: PT OT SLP Therapies (In Progress)       Topic: Occupational Therapy (In Progress)       Point: ADL training (In Progress)       Description:   Instruct learner(s) on proper safety adaptation and remediation techniques during self care or transfers.   Instruct in proper use of assistive devices.                  Learning Progress Summary             Patient Acceptance, E, NR by  at 9/19/2023 1157    Acceptance, E, VU by  at 9/18/2023 1826    Acceptance, E, NR by  at 9/15/2023 1025   Family Acceptance, E, NR by  at 9/19/2023 1157    Acceptance, E, VU by  at 9/18/2023 1826                         Point: Home exercise program (Done)       Description:   Instruct learner(s) on appropriate technique for monitoring, assisting and/or progressing therapeutic exercises/activities.                  Learning Progress Summary             Patient Acceptance, E, VU by  at 9/18/2023 1826   Family Acceptance, E, VU by  at 9/18/2023 1826                         Point: Precautions (In Progress)       Description:   Instruct  learner(s) on prescribed precautions during self-care and functional transfers.                  Learning Progress Summary             Patient Acceptance, E, NR by  at 9/19/2023 1157    Acceptance, E, VU by  at 9/18/2023 1826    Acceptance, E, NR by  at 9/15/2023 1025   Family Acceptance, E, NR by  at 9/19/2023 1157    Acceptance, E, VU by  at 9/18/2023 1826                         Point: Body mechanics (In Progress)       Description:   Instruct learner(s) on proper positioning and spine alignment during self-care, functional mobility activities and/or exercises.                  Learning Progress Summary             Patient Acceptance, E, NR by  at 9/19/2023 1157    Acceptance, E, VU by  at 9/18/2023 1826    Acceptance, E, NR by  at 9/15/2023 1025   Family Acceptance, E, NR by  at 9/19/2023 1157    Acceptance, E, VU by  at 9/18/2023 1826                                         User Key       Initials Effective Dates Name Provider Type Discipline     09/02/21 -  Hawa Morales, OT Occupational Therapist OT     10/14/22 -  Maria Elena Mayo OT Occupational Therapist OT     09/22/22 -  Alton Rosa, RN Registered Nurse Nurse                  OT Recommendation and Plan     Plan of Care Review  Plan of Care Reviewed With: patient, family  Progress: improving  Outcome Evaluation: Pt required increased assist of Mod Ax2 for bed mobility and Dep for LBD tasks. Pt limited d/t deficits in balance, strength, and functional endurance. Recommend cont skilled IPOT POC to promote return to PLOF. Recommend pt DC to SNF.     Time Calculation:         Time Calculation- OT       Row Name 09/19/23 1157             Time Calculation- OT    OT Start Time 1001  -CS      OT Received On 09/19/23  -      OT Goal Re-Cert Due Date 09/25/23  -         Timed Charges    86618 - OT Therapeutic Activity Minutes 10  -CS      41527 - OT Self Care/Mgmt Minutes 5  -CS         Total Minutes    Timed Charges Total  Minutes 15  -CS       Total Minutes 15  -CS                User Key  (r) = Recorded By, (t) = Taken By, (c) = Cosigned By      Initials Name Provider Type    CS Hawa Morales OT Occupational Therapist                  Therapy Charges for Today       Code Description Service Date Service Provider Modifiers Qty    [id number]  OT THERAPEUTIC ACT EA 15 MIN 9/19/2023 Hawa Morales OT GO 1                 Hawa Morales OT  9/19/2023    Electronically signed by Hawa Morales OT at 09/19/23 1159       Hawa Morales OT at 09/19/23 1001          Goal Outcome Evaluation:  Plan of Care Reviewed With: patient, family        Progress: improving  Outcome Evaluation: Pt required increased assist of Mod Ax2 for bed mobility and Dep for LBD tasks. Pt limited d/t deficits in balance, strength, and functional endurance. Recommend cont skilled IPOT POC to promote return to PLOF. Recommend pt DC to SNF.      Anticipated Discharge Disposition (OT): skilled nursing facility    Electronically signed by Hawa Morales OT at 09/19/23 7026

## 2023-09-19 NOTE — PLAN OF CARE
Goal Outcome Evaluation:  Plan of Care Reviewed With: patient        Progress: no change          *restarted home [place] for generalized aches and  pain  *remains anxious at times, PRN xanax   *needs encouragement to engage in care  *PT recommends SNF, CM notified   *chest pain continues, cards aware  *remains on 4-5L NC, encourage IS use and ambulation   *up in chair x2-3 hrs   *potassium 5.3, lokemia refused this afternoon. Education given on risk of high potassium. Pt not accepting and states she will see the doctor in the am.    *palliative consulted, will continue to follow for needs   *no UOP, bladder scan 159, start IV fluids   *transfer to tele

## 2023-09-19 NOTE — CONSULTS
Palliative Care Initial Consult   Attending Physician: Eliane Garcia DO  Referring Provider: Dr. Jacinta Jamison    Reason for Referral:  assistance with clarification of goals of care    Code Status:   Code Status and Medical Interventions:   Ordered at: 09/18/23 1146     Medical Intervention Limits:    NO intubation (DNI)    NO dialysis     Level Of Support Discussed With:    Patient    Next of Kin (If No Surrogate)     Code Status (Patient has no pulse and is not breathing):    No CPR (Do Not Attempt to Resuscitate)     Medical Interventions (Patient has pulse or is breathing):    Limited Support      Advanced Directives: Advance Directive Status: Patient does not have advance directive   Family/Support: Yasmin Huff (dtr), Norma Winn (dtr)  Goals of Care: TBD.    HPI: Betty Rod is a 82 y.o. female with PMH significant for CAD s/p CABG, CKD, DM, emphysema, gout, HTN. Patient transferred from [facility] on 9/13 after LHC showing multiple re-occlusions and no intervention performed, increasing chest pain and troponin elevation prompted transfer to higher level of care. Patient with LHC on 9/15 s/p OTC guided PCI with TOMAS and attempted PTCA of mid-circumflex. No further revascularization options by surgical or catheter-based approach per Cardiology. Medical therapy optimized per Cardiology. Palliative Care consulted for C in the context of complex medical decision making.  Patient receiving Xanax 0.5mg PO TID prn x2 doses in the last 24 hours. Morphine 2mg IV q2 hours prn pain x 2 doses in the last 24 hours. Receiving Ranexa 500mg q 12 hours.   Patient worked with PT, recommending discharge to SNF. Patient up in chair at time of visit, very drowsy after working with PT. Patient reports SOB currently on 4LNC, denies chest pain. Patient previously lived alone.     ROS: +debility. +SOB, currently on 4LNC. +anxiety. Denies chest pain, N/V/D, constipation. LBM 9/17.       Past Medical History:   Diagnosis Date  "   Anxiety     CKD (chronic kidney disease)     Coronary artery disease     Diabetes mellitus     Emphysema of lung     Gout     Hypertension     Osteoarthritis      Past Surgical History:   Procedure Laterality Date    CARDIAC SURGERY      CORONARY ARTERY BYPASS GRAFT      GALLBLADDER SURGERY       Social History     Socioeconomic History    Marital status:    Tobacco Use    Smoking status: Former     Types: Cigarettes     Quit date:      Years since quittin.7    Smokeless tobacco: Never   Vaping Use    Vaping Use: Never used   Substance and Sexual Activity    Alcohol use: Never    Drug use: Never    Sexual activity: Never     Family History   Problem Relation Age of Onset    Cancer Mother     Heart failure Mother     Heart failure Father     Cancer Sister     Cancer Brother        Allergies   Allergen Reactions    Amoxicillin Other (See Comments)     unknown       Current medication reviewed for route, type, dose and frequency and are current per MAR at time of dictation.    Palliative Performance Scale Score:  40%    /60   Pulse 78   Temp 97.6 °F (36.4 °C) (Axillary)   Resp 24   Ht 154.9 cm (61\")   Wt 77.4 kg (170 lb 10.2 oz)   SpO2 97%   BMI 32.24 kg/m²     Intake/Output Summary (Last 24 hours) at 2023 0850  Last data filed at 2023 2200  Gross per 24 hour   Intake --   Output 500 ml   Net -500 ml       Physical Exam:    General Appearance:    Patient sitting up in chair, A/C ill appearing, weak, awake, drowsy,  cooperative, NAD   HEENT:    NC/AT, EOMI, anicteric, MMM, face relaxed, NC in place   Neck:   supple, trachea midline, no JVD   Lungs:     CTA bilat, diminished in bases; respirations regular, even and unlabored; RR 20-22 on exam, 4LNC    Heart:    RRR, normal S1 and S2, no M/R/G, HR 57 on monitor   Abdomen:     Normal bowel sounds, soft, nontender, nondistended   G/U:   Deferred   MSK/Extremities:   Wasting, no edema   Pulses:   Pulses palpable and equal bilaterally "   Skin:   Warm, dry   Neurologic:   Drowsy, falls asleep while talking with patient, Ox3, cooperative, WILLIAMSON   Psych:   Calm, appropriate         Labs:   Results from last 7 days   Lab Units 09/19/23  0254   WBC 10*3/mm3 11.53*   HEMOGLOBIN g/dL 8.8*   HEMATOCRIT % 28.9*   PLATELETS 10*3/mm3 211     Results from last 7 days   Lab Units 09/19/23  0254   SODIUM mmol/L 141   POTASSIUM mmol/L 5.5*   CHLORIDE mmol/L 108*   CO2 mmol/L 25.0   BUN mg/dL 43*   CREATININE mg/dL 1.91*   GLUCOSE mg/dL 150*   CALCIUM mg/dL 8.9     Results from last 7 days   Lab Units 09/19/23  0254 09/18/23  0317   SODIUM mmol/L 141 141   POTASSIUM mmol/L 5.5* 4.6   CHLORIDE mmol/L 108* 106   CO2 mmol/L 25.0 26.0   BUN mg/dL 43* 37*   CREATININE mg/dL 1.91* 1.89*   CALCIUM mg/dL 8.9 8.7   BILIRUBIN mg/dL  --  0.4   ALK PHOS U/L  --  58   ALT (SGPT) U/L  --  15   AST (SGOT) U/L  --  24   GLUCOSE mg/dL 150* 113*     Imaging Results (Last 72 Hours)       ** No results found for the last 72 hours. **              Lab 09/14/23  0049   HEMOGLOBIN A1C 6.20*         Diagnostics: Reviewed    A:   NSTEMI (non-ST elevated myocardial infarction)    CKD (chronic kidney disease)    Coronary artery disease    Diabetes mellitus    Emphysema of lung    Gout    Hypertension    Atelectasis    Pleural effusion    Acute respiratory failure    Type 1 myocardial infarction     82 y.o. female with NSTEMI, CKD, CAD, acute respiratory failure.   S/S:   Pain -chest pain, resolving  -Ranexa 500mg PO BID  -continue Hydrocodone-Acetaminophen 7.5-325mg PO TID prn moderate pain  -continue Morphine 2mg IV q 2 hours prn severe pain    2. Dyspnea -currently on 4LNC  -may give Hydrocodone or Morphine prn dyspnea  -nebs    3. Anxiety -continue Xanax 0.5mg PO TID prn anxiety    4. Debility -PT/OT following, recommending SNF at d/c    5. GOC -DNR/DNI -per discussion   -patient very drowsy at time of visit, able to complete ROS but unable to engage in GOC discussion  -no LW/ACP, three  of four daughters will make majority for decision making if patient is incapacitated  -met with two of four daughters to discuss options regarding GOC  -family stating patient has indicated that she is willing to try SNF prior to return home  -discussed patient is eligible for hospice services if her goals are comfort focused     P: Introduced Palliative Care and services to patient and two daughters at bedside. Patient very drowsy and able to complete ROS however unable to engage in GOC discussion. Met with two of four daughters in family conference room. Discussed that due to patient's cardiac disease she is eligible for hospice services at home. Reviewed continued Full Treatment with goal to return to hospital versus comfort focused plan of care with hospice services at home or LTC. Reviewed SNF and daughters report patient is agreeable to short term rehab with goal of getting home. They have offered to have patient come live with them. Daughters are agreeable to hospice contact information for [facility] if patient's goals are to not return to the hospital after STR. All questions and concerns addressed.   Thank you for this consult and allowing us to participate in patient's plan of care. Palliative Care Team will continue to follow patient. Please do not hesitate to contact us regarding further symptom management or goals of care needs.  Time: 60 minutes spent reviewing medical and medication records, assessing and examining patient, discussing with family, answering questions, providing some guidance about a plan and documentation of care, and coordinating care with other healthcare members, with > 50% time spent face to face.         Masha Snowden, APRN  9/19/2023

## 2023-09-19 NOTE — PLAN OF CARE
Goal Outcome Evaluation:  Plan of Care Reviewed With: patient, family        Progress: improving  Outcome Evaluation: Pt required increased assist of Mod Ax2 for bed mobility and Dep for LBD tasks. Pt limited d/t deficits in balance, strength, and functional endurance. Recommend cont skilled IPOT POC to promote return to PLOF. Recommend pt DC to SNF.      Anticipated Discharge Disposition (OT): skilled nursing facility

## 2023-09-19 NOTE — THERAPY TREATMENT NOTE
Patient Name: Betty Rod  : 1940    MRN: [id number]                              Today's Date: 2023       Admit Date: 2023    Visit Dx:     ICD-10-CM ICD-9-CM   1. NSTEMI (non-ST elevated myocardial infarction)  I21.4 410.70     Patient Active Problem List   Diagnosis    NSTEMI (non-ST elevated myocardial infarction)    CKD (chronic kidney disease)    Coronary artery disease    Diabetes mellitus    Emphysema of lung    Gout    Hypertension    Atelectasis    Pleural effusion    Acute respiratory failure    Type 1 myocardial infarction     Past Medical History:   Diagnosis Date    Anxiety     CKD (chronic kidney disease)     Coronary artery disease     Diabetes mellitus     Emphysema of lung     Gout     Hypertension     Osteoarthritis      Past Surgical History:   Procedure Laterality Date    CARDIAC SURGERY      CORONARY ARTERY BYPASS GRAFT      GALLBLADDER SURGERY        General Information       Row Name 23 1140          OT Time and Intention    Document Type therapy note (daily note)  -CS     Mode of Treatment occupational therapy;co-treatment  -CS       Row Name 23 1140          General Information    Existing Precautions/Restrictions fall;cardiac;oxygen therapy device and L/min  -CS     Barriers to Rehab medically complex;previous functional deficit  -CS       Row Name 23 1140          Cognition    Orientation Status (Cognition) oriented x 3  -CS       Row Name 23 1140          Safety Issues, Functional Mobility    Impairments Affecting Function (Mobility) balance;endurance/activity tolerance;strength;shortness of breath  -CS               User Key  (r) = Recorded By, (t) = Taken By, (c) = Cosigned By      Initials Name Provider Type    CS Hawa Morales OT Occupational Therapist                     Mobility/ADL's       Row Name 23 1141          Bed Mobility    Bed Mobility supine-sit  -CS     Supine-Sit [place] (Bed Mobility) moderate assist (50%  patient effort);2 person assist;verbal cues  -     Assistive Device (Bed Mobility) bed rails;draw sheet;head of bed elevated  -       Row Name 09/19/23 1141          Transfers    Transfers sit-stand transfer;stand-sit transfer  -       Row Name 09/19/23 1141          Sit-Stand Transfer    Sit-Stand [place] (Transfers) minimum assist (75% patient effort);2 person assist;verbal cues  -CS     Assistive Device (Sit-Stand Transfers) walker, front-wheeled  -       Row Name 09/19/23 1141          Stand-Sit Transfer    Stand-Sit [place] (Transfers) minimum assist (75% patient effort);2 person assist;verbal cues  -CS     Assistive Device (Stand-Sit Transfers) walker, front-wheeled  -       Row Name 09/19/23 1141          Functional Mobility    Functional Mobility- Ind. Level minimum assist (75% patient effort);2 person assist required;verbal cues required  -     Functional Mobility- Device walker, front-wheeled  -CS     Functional Mobility-Distance (Feet) --  >household distance  -CS     Functional Mobility- Comment Pt required constant assist for RW management for safety, frequent episodes of LOB  -       Row Name 09/19/23 1141          Activities of Daily Living    BADL Assessment/Intervention lower body dressing;feeding  -       Row Name 09/19/23 1141          Lower Body Dressing Assessment/Training    [place] Level (Lower Body Dressing) don;socks;dependent (less than 25% patient effort)  -CS     Position (Lower Body Dressing) supine  -[facility] Name 09/19/23 1141          Self-Feeding Assessment/Training    [place] Level (Feeding) liquids to mouth;set up  -CS     Position (Self-Feeding) sitting up in bed  -               User Key  (r) = Recorded By, (t) = Taken By, (c) = Cosigned By      Initials Name Provider Type    CS Hawa Morales OT Occupational Therapist                   Obj/Interventions       Row Name 09/19/23 1143          Balance    Balance Assessment sitting static  balance;sitting dynamic balance;standing static balance;standing dynamic balance  -CS     Static Sitting Balance contact guard  -CS     Dynamic Sitting Balance contact guard  -CS     Position, Sitting Balance sitting edge of bed  -CS     Static Standing Balance minimal assist  -CS     Dynamic Standing Balance minimal assist  -CS     Position/Device Used, Standing Balance supported;walker, rolling  -CS     Balance Interventions sitting;standing;static;dynamic;dynamic reaching;occupation based/functional task;weight shifting activity  -CS               User Key  (r) = Recorded By, (t) = Taken By, (c) = Cosigned By      Initials Name Provider Type    CS Hawa Morales, MARICARMEN Occupational Therapist                   Goals/Plan    No documentation.                  Clinical Impression       Row Name 09/19/23 1144          Pain Assessment    Pretreatment Pain Rating 0/10 - no pain  -CS     Posttreatment Pain Rating 0/10 - no pain  -CS       Row Name 09/19/23 1144          Plan of Care Review    Plan of Care Reviewed With patient;family  -CS     Progress improving  -     Outcome Evaluation Pt required increased assist of Mod Ax2 for bed mobility and Dep for LBD tasks. Pt limited d/t deficits in balance, strength, and functional endurance. Recommend cont skilled IPOT POC to promote return to PLOF. Recommend pt DC to SNF.  -CS       Row Name 09/19/23 1144          Therapy Assessment/Plan (OT)    Patient/Family Therapy Goal Statement (OT) Return to PLOF  -       Row Name 09/19/23 1144          Therapy Plan Review/Discharge Plan (OT)    Anticipated Discharge Disposition (OT) skilled nursing facility  -       Row Name 09/19/23 1144          Vital Signs    Post Systolic BP Rehab 110  -CS     Post Treatment Diastolic BP 60  -CS     Pretreatment Heart Rate (beats/min) 50  -CS     Posttreatment Heart Rate (beats/min) 58  -CS     Pre SpO2 (%) 95  -CS     O2 Delivery Pre Treatment nasal cannula  -CS     Post SpO2 (%) 95  -CS      O2 Delivery Post Treatment nasal cannula  -CS     Pre Patient Position Supine  -CS     Intra Patient Position Standing  -CS     Post Patient Position Sitting  -CS       Row Name 09/19/23 1144          Positioning and Restraints    Pre-Treatment Position in bed  -CS     Post Treatment Position chair  -CS     Bathroom notified nsg;reclined;call light within reach;exit alarm on;encouraged to call for assist;RUE elevated;LUE elevated;waffle cushion;with family/caregiver  -CS               User Key  (r) = Recorded By, (t) = Taken By, (c) = Cosigned By      Initials Name Provider Type    Hawa Hastings OT Occupational Therapist                   Outcome Measures       Row Name 09/19/23 1155          How much help from another is currently needed...    Putting on and taking off regular lower body clothing? 1  -CS     Bathing (including washing, rinsing, and drying) 2  -CS     Toileting (which includes using toilet bed pan or urinal) 1  -CS     Putting on and taking off regular upper body clothing 2  -CS     Taking care of personal grooming (such as brushing teeth) 2  -CS     Eating meals 3  -CS     AM-PAC 6 Clicks Score (OT) 11  -CS       Row Name 09/19/23 1155          Functional Assessment    Outcome Measure Options AM-PAC 6 Clicks Daily Activity (OT)  -CS               User Key  (r) = Recorded By, (t) = Taken By, (c) = Cosigned By      Initials Name Provider Type    Hawa Hastings OT Occupational Therapist                    Occupational Therapy Education       Title: PT OT SLP Therapies (In Progress)       Topic: Occupational Therapy (In Progress)       Point: ADL training (In Progress)       Description:   Instruct learner(s) on proper safety adaptation and remediation techniques during self care or transfers.   Instruct in proper use of assistive devices.                  Learning Progress Summary             Patient Acceptance, E, NR by OSKAR at 9/19/2023 1157    Acceptance, E, VU by  at 9/18/2023 3730     Acceptance, E, NR by  at 9/15/2023 1025   Family Acceptance, E, NR by  at 9/19/2023 1157    Acceptance, E, VU by  at 9/18/2023 1826                         Point: Home exercise program (Done)       Description:   Instruct learner(s) on appropriate technique for monitoring, assisting and/or progressing therapeutic exercises/activities.                  Learning Progress Summary             Patient Acceptance, E, VU by  at 9/18/2023 1826   Family Acceptance, E, VU by  at 9/18/2023 1826                         Point: Precautions (In Progress)       Description:   Instruct learner(s) on prescribed precautions during self-care and functional transfers.                  Learning Progress Summary             Patient Acceptance, E, NR by  at 9/19/2023 1157    Acceptance, E, VU by  at 9/18/2023 1826    Acceptance, E, NR by  at 9/15/2023 1025   Family Acceptance, E, NR by  at 9/19/2023 1157    Acceptance, E, VU by  at 9/18/2023 1826                         Point: Body mechanics (In Progress)       Description:   Instruct learner(s) on proper positioning and spine alignment during self-care, functional mobility activities and/or exercises.                  Learning Progress Summary             Patient Acceptance, E, NR by  at 9/19/2023 1157    Acceptance, E, VU by  at 9/18/2023 1826    Acceptance, E, NR by  at 9/15/2023 1025   Family Acceptance, E, NR by  at 9/19/2023 1157    Acceptance, E, VU by  at 9/18/2023 1826                                         User Key       Initials Effective Dates Name Provider Type Discipline     09/02/21 -  Hawa Morales, OT Occupational Therapist OT     10/14/22 -  Maria Elena Mayo OT Occupational Therapist OT     09/22/22 -  Alton Rosa, RN Registered Nurse Nurse                  OT Recommendation and Plan     Plan of Care Review  Plan of Care Reviewed With: patient, family  Progress: improving  Outcome Evaluation: Pt required increased assist of Mod  Ax2 for bed mobility and Dep for LBD tasks. Pt limited d/t deficits in balance, strength, and functional endurance. Recommend cont skilled IPOT POC to promote return to PLOF. Recommend pt DC to SNF.     Time Calculation:         Time Calculation- OT       Row Name 09/19/23 1157             Time Calculation- OT    OT Start Time 1001  -CS      OT Received On 09/19/23  -CS      OT Goal Re-Cert Due Date 09/25/23  -CS         Timed Charges    19767 - OT Therapeutic Activity Minutes 10  -CS      52762 - OT Self Care/Mgmt Minutes 5  -CS         Total Minutes    Timed Charges Total Minutes 15  -CS       Total Minutes 15  -CS                User Key  (r) = Recorded By, (t) = Taken By, (c) = Cosigned By      Initials Name Provider Type    CS Hawa Morales OT Occupational Therapist                  Therapy Charges for Today       Code Description Service Date Service Provider Modifiers Qty    20544053093  OT THERAPEUTIC ACT EA 15 MIN 9/19/2023 Hawa Morales OT GO 1                 Hawa Morales OT  9/19/2023

## 2023-09-19 NOTE — PLAN OF CARE
Goal Outcome Evaluation:  Pt O2 requirement increased from 2-6 L nasal cannula, PRN nebs given for pt wheezing. K increased to 5.5, lokelma given.  mL with 1 large occurrence. Afebrile. Chest pain improved overnight, pt rested well. PRN morphine given x1.

## 2023-09-19 NOTE — THERAPY TREATMENT NOTE
Patient Name: Betty Rod  : 1940    MRN: [id number]                              Today's Date: 2023       Admit Date: 2023    Visit Dx:     ICD-10-CM ICD-9-CM   1. NSTEMI (non-ST elevated myocardial infarction)  I21.4 410.70     Patient Active Problem List   Diagnosis    NSTEMI (non-ST elevated myocardial infarction)    CKD (chronic kidney disease)    Coronary artery disease    Diabetes mellitus    Emphysema of lung    Gout    Hypertension    Atelectasis    Pleural effusion    Acute respiratory failure    Type 1 myocardial infarction     Past Medical History:   Diagnosis Date    Anxiety     CKD (chronic kidney disease)     Coronary artery disease     Diabetes mellitus     Emphysema of lung     Gout     Hypertension     Osteoarthritis      Past Surgical History:   Procedure Laterality Date    CARDIAC SURGERY      CORONARY ARTERY BYPASS GRAFT      GALLBLADDER SURGERY        General Information       Row Name 23 1153          Physical Therapy Time and Intention    Document Type therapy note (daily note)  -AY     Mode of Treatment physical therapy;co-treatment  -AY       Row Name 23 1153          General Information    Patient Profile Reviewed yes  -AY     Existing Precautions/Restrictions fall;cardiac;oxygen therapy device and L/min  -AY     Barriers to Rehab medically complex;previous functional deficit  -AY       Row Name 23 1153          Cognition    Orientation Status (Cognition) oriented x 3  -AY       Row Name 23 1153          Safety Issues, Functional Mobility    Impairments Affecting Function (Mobility) balance;endurance/activity tolerance;strength;shortness of breath  -AY               User Key  (r) = Recorded By, (t) = Taken By, (c) = Cosigned By      Initials Name Provider Type    AY Yaquelin Person PT Physical Therapist                   Mobility       Row Name 23 1153          Bed Mobility    Bed Mobility supine-sit  -AY     Supine-Sit [place] (Bed  Mobility) moderate assist (50% patient effort);2 person assist;verbal cues  -AY     Assistive Device (Bed Mobility) bed rails;draw sheet;head of bed elevated  -AY     Comment, (Bed Mobility) increased time required for bed mobility with multiples cues for hand placement and safety awareness.  -AY       Row Name 09/19/23 1153          Sit-Stand Transfer    Sit-Stand [place] (Transfers) minimum assist (75% patient effort);2 person assist;verbal cues  -AY     Assistive Device (Sit-Stand Transfers) walker, front-wheeled  -AY       Row Name 09/19/23 1153          Gait/Stairs (Locomotion)    [place] Level (Gait) minimum assist (75% patient effort);2 person assist;verbal cues  -AY     Assistive Device (Gait) walker, front-wheeled  -AY     Distance in Feet (Gait) 80  -AY     Deviations/Abnormal Patterns (Gait) da decreased;festinating/shuffling;gait speed decreased;bilateral deviations;stride length decreased;weight shifting decreased;base of support, narrow  -AY     Bilateral Gait Deviations heel strike decreased;forward flexed posture  -AY     Comment, (Gait/Stairs) cueing for posture, increased stride length, and walker positioning. manual assist required for walker management. multiple LOB noted, requiring min A to correct. Gait distance limited by worsening balance and CUEVA.  -AY               User Key  (r) = Recorded By, (t) = Taken By, (c) = Cosigned By      Initials Name Provider Type    AY Yaquelin Person PT Physical Therapist                   Obj/Interventions       Row Name 09/19/23 1159          Balance    Balance Assessment sitting static balance;sit to stand dynamic balance;sitting dynamic balance;standing static balance;standing dynamic balance  -AY     Static Sitting Balance contact guard  -AY     Dynamic Sitting Balance contact guard  -AY     Position, Sitting Balance unsupported;sitting edge of bed  -AY     Sit to Stand Dynamic Balance minimal assist  -AY     Static Standing Balance minimal  assist  -AY     Dynamic Standing Balance minimal assist  -AY     Position/Device Used, Standing Balance supported;walker, rolling  -AY               User Key  (r) = Recorded By, (t) = Taken By, (c) = Cosigned By      Initials Name Provider Type    Yaquelin Crews PT Physical Therapist                   Goals/Plan    No documentation.                  Clinical Impression       Row Name 09/19/23 1159          Pain    Pretreatment Pain Rating 0/10 - no pain  -AY     Posttreatment Pain Rating 0/10 - no pain  -AY     Pain Intervention(s) Ambulation/increased activity;Repositioned  -AY     Additional Documentation Pain Scale: Numbers Pre/Post-Treatment (Group)  -AY       Row Name 09/19/23 1159          Plan of Care Review    Plan of Care Reviewed With patient;family  -AY     Progress improving  -AY     Outcome Evaluation Pt increased ambulation distance, but showed worsening balance and endurance, ambulating 80ft with RWx and min Ax2 with multiple LOB noted. Limited by decreased functional endurance, balance deficit, and generalized weakness compared to baseline. d/c rec for SNF.  -AY       Row Name 09/19/23 1159          Vital Signs    Pretreatment Heart Rate (beats/min) 52  -AY     Posttreatment Heart Rate (beats/min) 61  -AY     Pre SpO2 (%) 94  -AY     Post SpO2 (%) 95  -AY     Pre Patient Position Supine  -AY     Intra Patient Position Standing  -AY     Post Patient Position Sitting  -AY       Row Name 09/19/23 1159          Positioning and Restraints    Pre-Treatment Position in bed  -AY     Post Treatment Position chair  -AY     In Chair notified nsg;reclined;sitting;call light within reach;encouraged to call for assist;exit alarm on;with family/caregiver;legs elevated;waffle cushion  -AY               User Key  (r) = Recorded By, (t) = Taken By, (c) = Cosigned By      Initials Name Provider Type    Yaquelin Crews PT Physical Therapist                   Outcome Measures       Row Name 09/19/23 7943           How much help from another person do you currently need...    Turning from your back to your side while in flat bed without using bedrails? 3  -AY     Moving from lying on back to sitting on the side of a flat bed without bedrails? 2  -AY     Moving to and from a bed to a chair (including a wheelchair)? 3  -AY     Standing up from a chair using your arms (e.g., wheelchair, bedside chair)? 3  -AY     Climbing 3-5 steps with a railing? 2  -AY     To walk in hospital room? 3  -AY     AM-PAC 6 Clicks Score (PT) 16  -AY     Highest level of mobility 5 --> Static standing  -AY       Row Name 09/19/23 1348 09/19/23 1155       Functional Assessment    Outcome Measure Options AM-PAC 6 Clicks Basic Mobility (PT)  -AY AM-PAC 6 Clicks Daily Activity (OT)  -CS              User Key  (r) = Recorded By, (t) = Taken By, (c) = Cosigned By      Initials Name Provider Type    CS Hawa Morales OT Occupational Therapist    Yaquelin Crews, PT Physical Therapist                                 Physical Therapy Education       Title: PT OT SLP Therapies (In Progress)       Topic: Physical Therapy (In Progress)       Point: Mobility training (In Progress)       Learning Progress Summary             Patient Acceptance, E,TB, NR by AY at 9/19/2023 1348    Acceptance, E,TB, NR by AY at 9/15/2023 1202                         Point: Home exercise program (Not Started)       Learner Progress:  Not documented in this visit.              Point: Body mechanics (In Progress)       Learning Progress Summary             Patient Acceptance, E,TB, NR by AY at 9/19/2023 1348    Acceptance, E,TB, NR by AY at 9/15/2023 1202                         Point: Precautions (In Progress)       Learning Progress Summary             Patient Acceptance, E,TB, NR by AY at 9/19/2023 1348    Acceptance, E,TB, NR by AY at 9/15/2023 1202                                         User Key       Initials Effective Dates Name Provider Type Discipline    AY 11/10/20 -   Yaquelin Person, BOOM Physical Therapist PT                  PT Recommendation and Plan  Planned Therapy Interventions (PT): balance training, bed mobility training, home exercise program, gait training, postural re-education, transfer training, patient/family education, neuromuscular re-education, strengthening  Plan of Care Reviewed With: patient, family  Progress: improving  Outcome Evaluation: Pt increased ambulation distance, but showed worsening balance and endurance, ambulating 80ft with RWx and min Ax2 with multiple LOB noted. Limited by decreased functional endurance, balance deficit, and generalized weakness compared to baseline. d/c rec for SNF.     Time Calculation:   PT Evaluation Complexity  History, PT Evaluation Complexity: 1-2 personal factors and/or comorbidities  Examination of Body Systems (PT Eval Complexity): total of 3 or more elements  Clinical Presentation (PT Evaluation Complexity): evolving  Clinical Decision Making (PT Evaluation Complexity): moderate complexity  Overall Complexity (PT Evaluation Complexity): moderate complexity     PT Charges       Row Name 09/19/23 1348             Time Calculation    Start Time 1020  -AY      PT Received On 09/19/23  -AY         Timed Charges    79898 - PT Therapeutic Activity Minutes 10  -AY         Total Minutes    Timed Charges Total Minutes 10  -AY       Total Minutes 10  -AY                User Key  (r) = Recorded By, (t) = Taken By, (c) = Cosigned By      Initials Name Provider Type    AY Yaquelin Person PT Physical Therapist                  Therapy Charges for Today       Code Description Service Date Service Provider Modifiers Qty    97577133534 HC PT THERAPEUTIC ACT EA 15 MIN 9/19/2023 Yaquelin Person PT GP 1            PT G-Codes  Outcome Measure Options: AM-PAC 6 Clicks Basic Mobility (PT)  AM-PAC 6 Clicks Score (PT): 16  AM-PAC 6 Clicks Score (OT): 11  PT Discharge Summary  Anticipated Discharge Disposition (PT): skilled nursing  facility    Yaquelin Person, PT  9/19/2023

## 2023-09-19 NOTE — PROGRESS NOTES
Intensive Care Follow-up     Hospital:  LOS: 6 days   Ms. Betty Rod, 82 y.o. female is followed for:   NSTEMI (non-ST elevated myocardial infarction)            History of present illness:   82-year-old female with past medical history of coronary disease s/p coronary bypass graft surgery transferred to [facility] for higher level of care.  Patient was admitted to [facility] on September 8 with non-ST elevation MI and left heart cath on September 12 showed multiple occlusions.  Patient was taken to Cath Lab and found to have left main stenosis was where she underwent PCI with drug-eluting stent.  PTCA of mid circumflex was attempted but unsuccessful.  Since the procedure patient has continued to Have chest pains.  Cardiology team following.  Pt was started on Ranexa.     Subjective   Interval History:  Overnight patient did Ok. Still complains of chest pressure but feels a little bit better. No other new issues or hemodynamic instability.          The patient's past medical, surgical and social history were reviewed and updated in Epic as appropriate.       Objective     Infusions:       Medications:  allopurinol, 100 mg, Oral, Daily  amLODIPine, 5 mg, Oral, Q24H  aspirin, 81 mg, Oral, Daily  atorvastatin, 40 mg, Oral, Nightly  clopidogrel, 75 mg, Oral, Daily  insulin lispro, 2-7 Units, Subcutaneous, 4x Daily AC & at Bedtime  isosorbide mononitrate, 120 mg, Oral, Q24H  metoprolol tartrate, 100 mg, Oral, Q12H  pantoprazole, 40 mg, Oral, Q AM  pharmacy consult - [place], , Does not apply, Daily  ranolazine, 500 mg, Oral, Q12H  senna-docusate sodium, 2 tablet, Oral, BID  sodium chloride, 10 mL, Intravenous, Q12H        Vital Sign Min/Max for last 24 hours  Temp  Min: 97.4 °F (36.3 °C)  Max: 98.7 °F (37.1 °C)   BP  Min: 82/58  Max: 130/72   Pulse  Min: 58  Max: 78   Resp  Min: 18  Max: 24   SpO2  Min: 88 %  Max: 98 %   Flow (L/min)  Min: 3  Max: 6       Input/Output for last 24 hour shift  09/18  "0701 - 09/19 0700  In: -   Out: 500 [Urine:500]      Objective:  Vital signs: (most recent): Blood pressure (!) 82/58, pulse 58, temperature 97.4 °F (36.3 °C), temperature source Axillary, resp. rate 20, height 154.9 cm (61\"), weight 77.4 kg (170 lb 10.2 oz), SpO2 95 %.          General Appearance: Awake, alert, in no acute distress  Lungs:   B/L Breath sounds present with decreased breath sounds on bases, no wheezing heard, occ basilar crackles.   Heart: S1 and S2 present, no murmur  Abdomen: Soft, nontender, no guarding or rigidity, bowel sounds positive.  Extremities:  trace edema, warm to touch.  Neurologic:  Moving all four extremities. Good strength bilaterally.   Psychological: Normal affect, Cooperative      Results from last 7 days   Lab Units 09/19/23  0254 09/16/23  0352 09/15/23  0416   WBC 10*3/mm3 11.53* 13.10* 14.24*   HEMOGLOBIN g/dL 8.8* 9.1* 9.5*   PLATELETS 10*3/mm3 211 188 204       Results from last 7 days   Lab Units 09/19/23  0254 09/18/23  0317 09/17/23  0524 09/16/23  0352   SODIUM mmol/L 141 141 141 140   POTASSIUM mmol/L 5.5* 4.6 4.7 4.9   CO2 mmol/L 25.0 26.0 26.0 21.0*   BUN mg/dL 43* 37* 32* 29*   CREATININE mg/dL 1.91* 1.89* 1.73* 1.56*   MAGNESIUM mg/dL 2.4 2.2 2.1 2.3   PHOSPHORUS mg/dL  --  3.2 3.1 4.3   GLUCOSE mg/dL 150* 113* 169* 108*       Estimated Creatinine Clearance: 21.7 mL/min (A) (by C-G formula based on SCr of 1.91 mg/dL (H)).          Images:   None new    I reviewed the patient's results and images.     Assessment & Plan   Impression        NSTEMI (non-ST elevated myocardial infarction)    CKD (chronic kidney disease)    Coronary artery disease    Diabetes mellitus    Emphysema of lung    Gout    Hypertension    Atelectasis    Pleural effusion    Acute respiratory failure    Type 1 myocardial infarction       Plan        1.  Patient with coronary disease status post successful PCI of ostial left main stenosis but unsuccessful PCI of circumflex. Cardiology team is " following and medical management recommended.  Patient is currently on Ranexa as well.  Feels little better today.  Continue dual antiplatelet agents.  2.  Patient denies any ongoing bleeding.  Mildly anemic with hemoglobin of 8.8.  Will trend for now and if drops any further will consider transfusion.  Discussed with risks and benefits of blood transfusion but now patient is willing to wait for another day and we will see if that helps.    3.  Denies any shortness of breath. Continue DuoNeb as needed.  4.  Creatinine has plateaued.  Potassium level is elevated.  Was given Lokelma and treatment for hyperkalemia.  We will recheck potassium later on today.  Urine output is improving.  We will continue to trend for now.  But did not contrast nephropathy.  5.  Patient apparently has nonspecific pains and has been on Norco at home.  Will resume.  6.  Continue statin. Beta-blocker to continue.  PO Amlodipine.  7.  GI prophylaxis.  8.  Bowel regimen.  9.  As needed Xanax for anxiety.  Looks like patient has been filling this medication pretty consistently at home.    Code status discussed yesterday. Pt is DNR/DNI.     Palliative care consulted per cardiology.     Patient can be transferred out of ICU to telemetry floor. Will sign out to hospitalist team.     Plan of care and goals reviewed with multidisciplinary/antibiotic stewardship team during rounds.   I discussed the patient's findings and my recommendations with patient, family, and nursing staff       Jesus Manuel Crabtree MD, [facility]  Pulmonary, Critical care and Sleep Medicine

## 2023-09-19 NOTE — CASE MANAGEMENT/SOCIAL WORK
Continued Stay Note   [facility]     Patient Name: Betty Rod  MRN: [id number]  Today's Date: 9/19/2023    Admit Date: 9/13/2023    Plan: SNF   Discharge Plan       Row Name 09/19/23 1458       Plan    Plan SNF    Patient/Family in Agreement with Plan yes    Plan Comments CM spoke with patient's daughter, Norma via phone. Norma in agreement with patient needing SNF. Norma would like referrals sent to [facility] in [facility] and [facility] in [facility]. Referrals sent. Patient's insurance, [employer], will require a prior authorization for this skilled rehab admit. CM following.    Final Discharge Disposition Code 03 - skilled nursing facility (SNF)                   Discharge Codes    No documentation.                 Expected Discharge Date and Time       Expected Discharge Date Expected Discharge Time    Sep 22, 2023               Sigrid Tabor RN

## 2023-09-19 NOTE — PROGRESS NOTES
"[facility]  IP Progress Note      Chief Complaint: Follow-up of non-STEMI/CAD/CRF's    Subjective   Kept having intermittent chest pressure however overall better after initiation of Ranexa.  Appears quite lethargic.  No shortness of breath    Objective     Blood pressure 112/57, pulse 71, temperature 97.6 °F (36.4 °C), temperature source Axillary, resp. rate 24, height 154.9 cm (61\"), weight 77.4 kg (170 lb 10.2 oz), SpO2 97 %.     Intake/Output Summary (Last 24 hours) at 9/19/2023 0704  Last data filed at 9/18/2023 2200  Gross per 24 hour   Intake --   Output 500 ml   Net -500 ml       Physical Exam:  General: No acute distress.   Neck: no JVD.  Chest:No respiratory distress, breath sounds are slightly diminished at bases, scattered rhonchi.    Cardiovascular: Normal S1 and S2, no murmur, gallop or rub.    Extremities: No edema.    Results Review:     I reviewed the patient's new clinical results.    Results from last 7 days   Lab Units 09/19/23  0254   WBC 10*3/mm3 11.53*   HEMOGLOBIN g/dL 8.8*   HEMATOCRIT % 28.9*   PLATELETS 10*3/mm3 211     Results from last 7 days   Lab Units 09/19/23  0254 09/18/23  0317   SODIUM mmol/L 141 141   POTASSIUM mmol/L 5.5* 4.6   CHLORIDE mmol/L 108* 106   CO2 mmol/L 25.0 26.0   BUN mg/dL 43* 37*   CREATININE mg/dL 1.91* 1.89*   CALCIUM mg/dL 8.9 8.7   BILIRUBIN mg/dL  --  0.4   ALK PHOS U/L  --  58   ALT (SGPT) U/L  --  15   AST (SGOT) U/L  --  24   GLUCOSE mg/dL 150* 113*     Results from last 7 days   Lab Units 09/19/23  0254   SODIUM mmol/L 141   POTASSIUM mmol/L 5.5*   CHLORIDE mmol/L 108*   CO2 mmol/L 25.0   BUN mg/dL 43*   CREATININE mg/dL 1.91*   GLUCOSE mg/dL 150*   CALCIUM mg/dL 8.9     Results from last 7 days   Lab Units 09/13/23  2219 09/13/23  1804   INR  1.22* 1.15     Lab Results   Component Value Date    CKTOTAL 236 (H) 09/09/2023    TROPONINT 872 (C) 09/14/2023     Results from last 7 days   Lab Units 09/13/23 2219   TSH " uIU/mL 0.515     Results from last 7 days   Lab Units 09/14/23  0049   CHOLESTEROL mg/dL 122   TRIGLYCERIDES mg/dL 64   HDL CHOL mg/dL 67*   LDL CHOL mg/dL 41           Tele: Sinus Rythym      Assessment:  Non ST Elevated MI.  [facility] 9/12/2023 at OSH with multiple, re-occlusions, no intervention  [facility] 9/15/2023: 80% ostial left main stenosis s/p OTC guided PCI with TOMAS and attempted PTCA of mid-circumflex, but balloon would not cross.   History of CAD s/p remote CABG. Normal EF.  Hypertension.  Dyslipidemia.   Diabetes.   SILVANA on CKD, bumex discontinued over the weekend.   Anemia.   Plan:  The patient does not have any further revascularization options by surgical or catheter-based approach.  She has been maximally revascularized.  Her medical therapy has been optimized.  Continue current medical management including dual antiplatelet therapy, current dose of Ranexa and Imdur.  Increase metoprolol to 100 mg twice daily for optimal beta-blockade to achieve resting heart rate of 60.  Palliative care consultation to discuss goals of treatment, and limitations of care with the patient and her family.  She may benefit from blood transfusion to maintain hemoglobin over 10 to optimize O2 delivery.  Will let the ICU team decide.  Out of bed to chair, increase activities as tolerated.  Okay to transfer to telemetry from cardiology standpoint.    Jacinta Jamison MD, FACC, [facility]

## 2023-09-20 ENCOUNTER — APPOINTMENT (OUTPATIENT)
Dept: GENERAL RADIOLOGY | Facility: HOSPITAL | Age: 83
DRG: 246 | End: 2023-09-20
Payer: MEDICARE

## 2023-09-20 VITALS
HEIGHT: 61 IN | HEART RATE: 58 BPM | RESPIRATION RATE: 20 BRPM | WEIGHT: 173.28 LBS | TEMPERATURE: 97.9 F | DIASTOLIC BLOOD PRESSURE: 76 MMHG | OXYGEN SATURATION: 97 % | SYSTOLIC BLOOD PRESSURE: 137 MMHG | BODY MASS INDEX: 32.72 KG/M2

## 2023-09-20 LAB
ANION GAP SERPL CALCULATED.3IONS-SCNC: 13 MMOL/L (ref 5–15)
ARTERIAL PATENCY WRIST A: ABNORMAL
ATMOSPHERIC PRESS: ABNORMAL MM[HG]
BASE EXCESS BLDA CALC-SCNC: -3.4 MMOL/L (ref 0–2)
BASOPHILS # BLD AUTO: 0.05 10*3/MM3 (ref 0–0.2)
BASOPHILS NFR BLD AUTO: 0.4 % (ref 0–1.5)
BDY SITE: ABNORMAL
BODY TEMPERATURE: 37 C
BUN SERPL-MCNC: 49 MG/DL (ref 8–23)
BUN/CREAT SERPL: 23.4 (ref 7–25)
CALCIUM SPEC-SCNC: 8.7 MG/DL (ref 8.6–10.5)
CHLORIDE SERPL-SCNC: 107 MMOL/L (ref 98–107)
CO2 BLDA-SCNC: 26.8 MMOL/L (ref 22–33)
CO2 SERPL-SCNC: 21 MMOL/L (ref 22–29)
COHGB MFR BLD: 1.3 % (ref 0–2)
CREAT SERPL-MCNC: 2.09 MG/DL (ref 0.57–1)
DEPRECATED RDW RBC AUTO: 59.2 FL (ref 37–54)
EGFRCR SERPLBLD CKD-EPI 2021: 23.4 ML/MIN/1.73
EOSINOPHIL # BLD AUTO: 0.24 10*3/MM3 (ref 0–0.4)
EOSINOPHIL NFR BLD AUTO: 1.7 % (ref 0.3–6.2)
EPAP: 0
ERYTHROCYTE [DISTWIDTH] IN BLOOD BY AUTOMATED COUNT: 15.8 % (ref 12.3–15.4)
GLUCOSE BLDC GLUCOMTR-MCNC: 199 MG/DL (ref 70–130)
GLUCOSE BLDC GLUCOMTR-MCNC: 223 MG/DL (ref 70–130)
GLUCOSE SERPL-MCNC: 218 MG/DL (ref 65–99)
HCO3 BLDA-SCNC: 24.9 MMOL/L (ref 20–26)
HCT VFR BLD AUTO: 28.8 % (ref 34–46.6)
HCT VFR BLD CALC: 27.4 % (ref 38–51)
HGB BLD-MCNC: 8.8 G/DL (ref 12–15.9)
HGB BLDA-MCNC: 8.9 G/DL (ref 14–18)
IMM GRANULOCYTES # BLD AUTO: 0.12 10*3/MM3 (ref 0–0.05)
IMM GRANULOCYTES NFR BLD AUTO: 0.9 % (ref 0–0.5)
INHALED O2 CONCENTRATION: 80 %
IPAP: 0
LYMPHOCYTES # BLD AUTO: 1.1 10*3/MM3 (ref 0.7–3.1)
LYMPHOCYTES NFR BLD AUTO: 8 % (ref 19.6–45.3)
Lab: ABNORMAL
MAGNESIUM SERPL-MCNC: 2.3 MG/DL (ref 1.6–2.4)
MCH RBC QN AUTO: 31.3 PG (ref 26.6–33)
MCHC RBC AUTO-ENTMCNC: 30.6 G/DL (ref 31.5–35.7)
MCV RBC AUTO: 102.5 FL (ref 79–97)
METHGB BLD QL: 0 % (ref 0–1.5)
MODALITY: ABNORMAL
MONOCYTES # BLD AUTO: 1.25 10*3/MM3 (ref 0.1–0.9)
MONOCYTES NFR BLD AUTO: 9.1 % (ref 5–12)
NEUTROPHILS NFR BLD AUTO: 10.97 10*3/MM3 (ref 1.7–7)
NEUTROPHILS NFR BLD AUTO: 79.9 % (ref 42.7–76)
NOTIFIED BY: ABNORMAL
NOTIFIED WHO: ABNORMAL
NRBC BLD AUTO-RTO: 0.2 /100 WBC (ref 0–0.2)
OXYHGB MFR BLDV: 94.9 % (ref 94–99)
PAW @ PEAK INSP FLOW SETTING VENT: 0 CMH2O
PCO2 BLDA: 62.5 MM HG (ref 35–45)
PCO2 TEMP ADJ BLD: 62.5 MM HG (ref 35–45)
PH BLDA: 7.21 PH UNITS (ref 7.35–7.45)
PH, TEMP CORRECTED: 7.21 PH UNITS
PLATELET # BLD AUTO: 264 10*3/MM3 (ref 140–450)
PMV BLD AUTO: 9.7 FL (ref 6–12)
PO2 BLDA: 90.5 MM HG (ref 83–108)
PO2 TEMP ADJ BLD: 90.5 MM HG (ref 83–108)
POTASSIUM SERPL-SCNC: 4.4 MMOL/L (ref 3.5–5.2)
RBC # BLD AUTO: 2.81 10*6/MM3 (ref 3.77–5.28)
SODIUM SERPL-SCNC: 141 MMOL/L (ref 136–145)
TOTAL RATE: 0 BREATHS/MINUTE
WBC NRBC COR # BLD: 13.73 10*3/MM3 (ref 3.4–10.8)

## 2023-09-20 PROCEDURE — 25010000002 FUROSEMIDE PER 20 MG: Performed by: NURSE PRACTITIONER

## 2023-09-20 PROCEDURE — 94799 UNLISTED PULMONARY SVC/PX: CPT

## 2023-09-20 PROCEDURE — 63710000001 INSULIN LISPRO (HUMAN) PER 5 UNITS: Performed by: INTERNAL MEDICINE

## 2023-09-20 PROCEDURE — 36600 WITHDRAWAL OF ARTERIAL BLOOD: CPT

## 2023-09-20 PROCEDURE — 25010000002 MORPHINE PER 10 MG

## 2023-09-20 PROCEDURE — 25010000002 ONDANSETRON PER 1 MG: Performed by: INTERNAL MEDICINE

## 2023-09-20 PROCEDURE — 83735 ASSAY OF MAGNESIUM: CPT | Performed by: INTERNAL MEDICINE

## 2023-09-20 PROCEDURE — 85025 COMPLETE CBC W/AUTO DIFF WBC: CPT | Performed by: INTERNAL MEDICINE

## 2023-09-20 PROCEDURE — 82948 REAGENT STRIP/BLOOD GLUCOSE: CPT

## 2023-09-20 PROCEDURE — 94660 CPAP INITIATION&MGMT: CPT

## 2023-09-20 PROCEDURE — 82375 ASSAY CARBOXYHB QUANT: CPT

## 2023-09-20 PROCEDURE — 82805 BLOOD GASES W/O2 SATURATION: CPT

## 2023-09-20 PROCEDURE — 99232 SBSQ HOSP IP/OBS MODERATE 35: CPT | Performed by: INTERNAL MEDICINE

## 2023-09-20 PROCEDURE — 71045 X-RAY EXAM CHEST 1 VIEW: CPT

## 2023-09-20 PROCEDURE — 99239 HOSP IP/OBS DSCHRG MGMT >30: CPT

## 2023-09-20 PROCEDURE — 80048 BASIC METABOLIC PNL TOTAL CA: CPT | Performed by: INTERNAL MEDICINE

## 2023-09-20 PROCEDURE — 25010000002 MORPHINE PER 10 MG: Performed by: INTERNAL MEDICINE

## 2023-09-20 PROCEDURE — 83050 HGB METHEMOGLOBIN QUAN: CPT

## 2023-09-20 RX ORDER — MORPHINE SULFATE 20 MG/ML
5 SOLUTION ORAL
Status: DISCONTINUED | OUTPATIENT
Start: 2023-09-20 | End: 2023-09-20 | Stop reason: HOSPADM

## 2023-09-20 RX ORDER — METOLAZONE 5 MG/1
20 TABLET ORAL ONCE
Status: DISCONTINUED | OUTPATIENT
Start: 2023-09-20 | End: 2023-09-20 | Stop reason: HOSPADM

## 2023-09-20 RX ORDER — FUROSEMIDE 10 MG/ML
80 INJECTION INTRAMUSCULAR; INTRAVENOUS ONCE
Status: COMPLETED | OUTPATIENT
Start: 2023-09-20 | End: 2023-09-20

## 2023-09-20 RX ORDER — MORPHINE SULFATE 2 MG/ML
2 INJECTION, SOLUTION INTRAMUSCULAR; INTRAVENOUS
Status: DISCONTINUED | OUTPATIENT
Start: 2023-09-20 | End: 2023-09-20 | Stop reason: HOSPADM

## 2023-09-20 RX ORDER — ALPRAZOLAM 0.5 MG/1
0.5 TABLET ORAL 3 TIMES DAILY PRN
Qty: 15 TABLET | Refills: 0 | Status: SHIPPED | OUTPATIENT
Start: 2023-09-20 | End: 2023-09-25

## 2023-09-20 RX ORDER — MORPHINE SULFATE 20 MG/ML
5 SOLUTION ORAL
Qty: 30 ML | Refills: 0 | Status: SHIPPED | OUTPATIENT
Start: 2023-09-20

## 2023-09-20 RX ORDER — CLOPIDOGREL BISULFATE 75 MG/1
75 TABLET ORAL DAILY
Qty: 30 TABLET | Refills: 0 | Status: SHIPPED | OUTPATIENT
Start: 2023-09-21

## 2023-09-20 RX ORDER — RANOLAZINE 500 MG/1
500 TABLET, EXTENDED RELEASE ORAL EVERY 12 HOURS SCHEDULED
Qty: 30 TABLET | Refills: 0 | Status: SHIPPED | OUTPATIENT
Start: 2023-09-20

## 2023-09-20 RX ORDER — ISOSORBIDE MONONITRATE 120 MG/1
120 TABLET, EXTENDED RELEASE ORAL
Qty: 30 TABLET | Refills: 0 | Status: SHIPPED | OUTPATIENT
Start: 2023-09-21

## 2023-09-20 RX ORDER — METOPROLOL TARTRATE 100 MG/1
100 TABLET ORAL EVERY 12 HOURS SCHEDULED
Qty: 30 TABLET | Refills: 0 | Status: SHIPPED | OUTPATIENT
Start: 2023-09-20

## 2023-09-20 RX ORDER — ASPIRIN 81 MG/1
81 TABLET, CHEWABLE ORAL DAILY
Qty: 30 TABLET | Refills: 0 | Status: SHIPPED | OUTPATIENT
Start: 2023-09-21

## 2023-09-20 RX ADMIN — INSULIN LISPRO 2 UNITS: 100 INJECTION, SOLUTION INTRAVENOUS; SUBCUTANEOUS at 08:50

## 2023-09-20 RX ADMIN — RANOLAZINE 500 MG: 500 TABLET, FILM COATED, EXTENDED RELEASE ORAL at 08:54

## 2023-09-20 RX ADMIN — ALLOPURINOL 100 MG: 100 TABLET ORAL at 08:54

## 2023-09-20 RX ADMIN — IPRATROPIUM BROMIDE AND ALBUTEROL SULFATE 3 ML: 2.5; .5 SOLUTION RESPIRATORY (INHALATION) at 02:45

## 2023-09-20 RX ADMIN — CLOPIDOGREL BISULFATE 75 MG: 75 TABLET ORAL at 08:51

## 2023-09-20 RX ADMIN — ONDANSETRON 4 MG: 2 INJECTION INTRAMUSCULAR; INTRAVENOUS at 13:55

## 2023-09-20 RX ADMIN — Medication 10 ML: at 08:54

## 2023-09-20 RX ADMIN — HYDROCODONE BITARTRATE AND ACETAMINOPHEN 1 TABLET: 7.5; 325 TABLET ORAL at 02:07

## 2023-09-20 RX ADMIN — MORPHINE SULFATE 2 MG: 2 INJECTION, SOLUTION INTRAMUSCULAR; INTRAVENOUS at 00:37

## 2023-09-20 RX ADMIN — ALPRAZOLAM 0.5 MG: 0.5 TABLET ORAL at 13:12

## 2023-09-20 RX ADMIN — MORPHINE SULFATE 2 MG: 2 INJECTION, SOLUTION INTRAMUSCULAR; INTRAVENOUS at 13:56

## 2023-09-20 RX ADMIN — METOPROLOL TARTRATE 100 MG: 100 TABLET, FILM COATED ORAL at 09:37

## 2023-09-20 RX ADMIN — FUROSEMIDE 80 MG: 10 INJECTION, SOLUTION INTRAMUSCULAR; INTRAVENOUS at 07:39

## 2023-09-20 RX ADMIN — AMLODIPINE BESYLATE 5 MG: 5 TABLET ORAL at 09:34

## 2023-09-20 RX ADMIN — ONDANSETRON 4 MG: 2 INJECTION INTRAMUSCULAR; INTRAVENOUS at 07:53

## 2023-09-20 RX ADMIN — ISOSORBIDE MONONITRATE 120 MG: 60 TABLET, EXTENDED RELEASE ORAL at 08:51

## 2023-09-20 RX ADMIN — MORPHINE SULFATE 2 MG: 2 INJECTION, SOLUTION INTRAMUSCULAR; INTRAVENOUS at 04:23

## 2023-09-20 RX ADMIN — PANTOPRAZOLE SODIUM 40 MG: 40 TABLET, DELAYED RELEASE ORAL at 05:12

## 2023-09-20 RX ADMIN — ASPIRIN 81 MG: 81 TABLET, CHEWABLE ORAL at 08:51

## 2023-09-20 RX ADMIN — INSULIN LISPRO 2 UNITS: 100 INJECTION, SOLUTION INTRAVENOUS; SUBCUTANEOUS at 12:55

## 2023-09-20 NOTE — DISCHARGE PLACEMENT REQUEST
"Betty Bernard (82 y.o. Female)    Referred by Masha ANDRES  Dx-Severe CAD/respiratory failure related to pulmonary edema   Date of Birth   1940    Social Security Number       Address   [address] [facility] [address]    Home Phone   [phone]    MRN   [id number]       Christian   Pentecostal    Marital Status                               Admission Date   9/13/23    Admission Type   Urgent    Admitting Provider   Case, Eliane ROBBINS DO    Attending Provider   Jose, Eliane ROBBINS DO    Department, Room/Bed   [facility] 2A ICU, N217/1       Discharge Date       Discharge Disposition       Discharge Destination                                 Attending Provider: Eliane Garcia DO    Allergies: Amoxicillin    Isolation: None   Infection: None   Code Status: No CPR    Ht: 154.9 cm (61\")   Wt: 78.6 kg (173 lb 4.5 oz)    Admission Cmt: None   Principal Problem: NSTEMI (non-ST elevated myocardial infarction) [I21.4]                   Active Insurance as of 9/13/2023       Primary Coverage       Payor Plan Insurance Group Employer/Plan Group    HUMANA MEDICARE REPLACEMENT HUMANA MEDICARE REPLACEMENT [id number]       Payor Plan Address Payor Plan Phone Number Payor Plan Fax Number Effective Dates    [address] [address] [phone]  1/1/2018 - None Entered    [facility] [id number]         Subscriber Name Subscriber Birth Date Member ID       BETTY BERNARD 1940 [id number]                     Emergency Contacts        (Rel.) Home Phone Work Phone Mobile Phone    Yasmin Huff (Daughter) [phone] -- --    PaElinay (Daughter) [phone] -- --              Emergency Contact Information       Name Relation Home Work Mobile    Yasmin Huff Daughter [phone]      PaNorma Daughter [phone]            Insurance Information                  HUMANA MEDICARE REPLACEMENT/HUMANA MEDICARE REPLACEMENT Phone: [phone]    Subscriber: MarcelBetty " "Subscriber#: [id number]    Group#: [id number] Precert#: [id number]             History & Physical        Case, Eliane ROBBINS DO at 09/13/23 2010            INTENSIVIST   INITIAL HOSPITAL VISIT NOTE     Hospital:  LOS: 0 days     Ms. Betty Rod, 82 y.o. female is seen for a Chief Complaint of:      NSTEMI (non-ST elevated myocardial infarction)    CKD (chronic kidney disease)    Coronary artery disease    Diabetes mellitus    Emphysema of lung    Gout    Hypertension      Subjective  S     Betty Rod is a 82 y.o. female who has a PMHX of CAD (s/p CABG) who was transferred to our facility for work up for potential high-risk PCI.      Patient was admitted to [facility] on 9/8 from [facility] w/ NSTEMI with a LHC on 9/12 which showed multiple re-occlusions.  No intervention was performed.  She developed increased CP and troponin elevation on 9/13 and so was transferred to our facility for higher level of care.    Records available from [facility] are only up to 9/9.  They indiate that she may have had a UTI upon admission and was started on Rocephin.  There is mention of a planned CT of the chest due to \"paratracheal fullness\".  We do not have the results of these tests.  We will contact them to obtain full records.      4 minutes of critical care provided by DANNY Jones in addendum accordance with split/shared billing. This time excludes other billable procedures. Time does include preparation of documents, medical consultations, review of old records, and direct bedside care. Patient is at high risk for life-threatening deterioration due to NSTEMI.   Electronically signed by DANNY Wise, 09/13/23, 8:10 PM EDT.          PMH: She  has a past medical history of Anxiety, CKD (chronic kidney disease), Coronary artery disease, Diabetes mellitus, Emphysema of lung, Gout, Hypertension, and Osteoarthritis.   PSxH: She  has a past surgical history that includes Coronary artery bypass graft. "      Medications:  No current facility-administered medications on file prior to encounter.     No current outpatient medications on file prior to encounter.       Allergies: She is allergic to amoxicillin.   FH: Her family history includes Cancer in her brother, mother, and sister; Heart failure in her father and mother.   SH: She  reports that she quit smoking about 23 years ago. Her smoking use included cigarettes. She does not have any smokeless tobacco history on file. She reports that she does not drink alcohol and does not use drugs.     The patient's relevant past medical, surgical and social history were reviewed and updated in Epic as appropriate.     ROS (14):   Review of Systems   Constitutional:  Positive for diaphoresis.   Respiratory:  Positive for shortness of breath.    Cardiovascular:  Positive for chest pain.   Gastrointestinal:  Positive for nausea and vomiting.     Objective  O     Vitals    Temp  Min: 98.4 °F (36.9 °C)  Max: 98.4 °F (36.9 °C)  BP  Min: 128/61  Max: 128/61  Pulse  Min: 80  Max: 80  No data recorded  SpO2  Min: 95 %  Max: 95 % No data recorded     I/O 24 hrs (7:00AM - 6:59 AM)  Intake/Output       None            Medications (drips):      Physical Examination    Telemetry: Normal sinus rhythm.    Constitutional:  No acute distress.  Conversant. Sitting up in bed on nasal cannula.    HEENT: Normocephalic and atraumatic.   Neck:  Normal range of motion. Neck supple.   No JVD present.   No thyromegaly.    Cardiovascular: Regular rate and rhythm.  No murmurs, rub or gallop.  No peripheral edema.   Respiratory: Normal respiratory effort.  Diminished bilaterally. No wheezing.    Abdominal:  Soft. No masses.   Non-tender. No distension.   No hepatosplenomegaly.   MSK: Normal muscle strength and tone.   Extremities: No digital cyanosis or clubbing.   Skin: Skin is warm and dry.  No rashes, lesions or ulcers noted.    Neurological:   Alert and interactive though slow to respond.   Moves  all extremities.   Psychiatric:  Normal affect.   Normal judgment.           Results from last 7 days   Lab Units 09/13/23 2219   WBC 10*3/mm3 16.28*   HEMOGLOBIN g/dL 9.2*   MCV fL 101.0*   PLATELETS 10*3/mm3 193     Results from last 7 days   Lab Units 09/13/23 2219   SODIUM mmol/L 141   POTASSIUM mmol/L 4.6   CO2 mmol/L 24.0   CREATININE mg/dL 1.65*   MAGNESIUM mg/dL 1.9   PHOSPHORUS mg/dL 2.5     CrCl cannot be calculated (Unknown ideal weight.).  Results from last 7 days   Lab Units 09/13/23 2219   ALK PHOS U/L 63   BILIRUBIN mg/dL 0.3   ALT (SGPT) U/L 10   AST (SGOT) U/L 18               Images:    Imaging Results (Last 24 Hours)       ** No results found for the last 24 hours. **          ECG/EMG Results (last 24 hours)       ** No results found for the last 24 hours. **            I reviewed the patient's new laboratory and imaging results.  I independently reviewed the patient's new images.    Assessment & Plan  A / P     Ms. Rod is a 81yo F with a history of CAD s/p CABG, CKD, and gout who was hospitalized at [facility] on 9/8/23 for NSTEMI. She was noted to have elevated creatinine and LHC was delayed until 9/12/13. LHC reportedly showed multiple re-occlusions and no intervention was performed. We do not have these records as the records that arrived with the patient are only through 9/9.     She developed recurrent chest pain on 9/13/23 and transfer was requested to [facility] for review by our Interventional Cardiologists.     Upon arrival, she is currently chest pain free.       Nutrition:   NPO Diet NPO Type: Strict NPO  Advance Directives:   Code Status and Medical Interventions:   Ordered at: 09/13/23 2206     Code Status (Patient has no pulse and is not breathing):    CPR (Attempt to Resuscitate)     Medical Interventions (Patient has pulse or is breathing):    Full Support         NSTEMI (non-ST elevated myocardial infarction)    CKD (chronic kidney disease)    Coronary artery disease     Diabetes mellitus    Emphysema of lung    Gout    Hypertension      Assessment / Plan:    Admit to ICU  EKG now.   Continue heparin infusion.  Check labs  Nitro paste for recurrent chest pain as there is a national shortage of IV Nitroglycerin. Will plan to start a drip if she develops chest pain not responsive to nitro-paste.   Cardiology to see in the AM  Echocardiogram  AM labs    High risk secondary to ongoing going chest pain in the setting of CAD.     Plan of care and goals reviewed during interdisciplinary rounds.  I discussed the patient's findings and my recommendations with patient and nursing staff      Eliane Garcia DO  Pulmonary and Critical Care Medicine              Electronically signed by Eliane Garcia DO at 09/13/23 2248       Current Facility-Administered Medications   Medication Dose Route Frequency Provider Last Rate Last Admin    allopurinol (ZYLOPRIM) tablet 100 mg  100 mg Oral Daily Irvni Gilliam MD   100 mg at 09/20/23 0854    ALPRAZolam (XANAX) tablet 0.5 mg  0.5 mg Oral TID PRN Jesus Manuel Crabtree MD   0.5 mg at 09/19/23 2333    amLODIPine (NORVASC) tablet 5 mg  5 mg Oral Q24H Irvin Gilliam MD   5 mg at 09/20/23 0934    aspirin chewable tablet 81 mg  81 mg Oral Daily Irvin Gilliam MD   81 mg at 09/20/23 0851    atorvastatin (LIPITOR) tablet 40 mg  40 mg Oral Nightly Irvin Gilliam MD   40 mg at 09/19/23 2001    sennosides-docusate (PERICOLACE) 8.6-50 MG per tablet 2 tablet  2 tablet Oral BID Irvin Gilliam MD   2 tablet at 09/19/23 2002    And    polyethylene glycol (MIRALAX) packet 17 g  17 g Oral Daily PRN Irvin Gilliam MD        And    bisacodyl (DULCOLAX) EC tablet 5 mg  5 mg Oral Daily PRN Irvin Gilliam MD        And    bisacodyl (DULCOLAX) suppository 10 mg  10 mg Rectal Daily PRN Irvin Gilliam MD        calcium carbonate (TUMS) chewable tablet 500 mg (200 mg elemental)  2 tablet Oral TID PRN Irvin Gilliam MD   2 tablet at 09/14/23 1909     clopidogrel (PLAVIX) tablet 75 mg  75 mg Oral Daily Irvin Gilliam MD   75 mg at 09/20/23 0851    dextrose (D50W) (25 g/50 mL) IV injection 25 g  25 g Intravenous Q15 Min PRN Irvin Gilliam MD        dextrose (GLUTOSE) oral gel 15 g  15 g Oral Q15 Min PRN Irvin Gilliam MD        glucagon (GLUCAGEN) injection 1 mg  1 mg Intramuscular Q15 Min PRN Irvin Gilliam MD        HYDROcodone-acetaminophen (NORCO) 7.5-325 MG per tablet 1 tablet  1 tablet Oral TID PRN Jesus Manuel Crabtree MD   1 tablet at 09/20/23 0207    Insulin Lispro (humaLOG) injection 2-7 Units  2-7 Units Subcutaneous 4x Daily AC & at Bedtime Irvin Gilliam MD   2 Units at 09/20/23 0850    ipratropium-albuterol (DUO-NEB) nebulizer solution 3 mL  3 mL Nebulization Q6H PRN Irvin Gilliam MD   3 mL at 09/20/23 0245    isosorbide mononitrate (IMDUR) 24 hr tablet 120 mg  120 mg Oral Q24H Reji Buck MD   120 mg at 09/20/23 0851    metOLazone (ZAROXOLYN) tablet 20 mg  20 mg Oral Once Slade Pollack APRN        metoprolol tartrate (LOPRESSOR) tablet 100 mg  100 mg Oral Q12H Jacinta Jamison MD   100 mg at 09/20/23 0937    morphine injection 2 mg  2 mg Intravenous Q1H PRN Masha Snowden APRN        Or    morphine concentrated solution 5 mg  5 mg Oral Q3H PRN Masha Snowden APRN        nitroglycerin (NITROSTAT) SL tablet 0.4 mg  0.4 mg Sublingual Q5 Min PRN Irvin Gilliam MD   0.4 mg at 09/18/23 0611    ondansetron (ZOFRAN) tablet 4 mg  4 mg Oral Q6H PRN Irvin Gilliam MD        Or    ondansetron (ZOFRAN) injection 4 mg  4 mg Intravenous Q6H PRN Irvin Gilliam MD   4 mg at 09/20/23 0753    pantoprazole (PROTONIX) EC tablet 40 mg  40 mg Oral Q AM [place], [place] C, Kaylynn   40 mg at 09/20/23 0512    Pharmacy Consult - MTM   Does not apply Daily Irvin Gilliam MD        ranolazine (RANEXA) 12 hr tablet 500 mg  500 mg Oral Q12H Sandra Roman PA-C   500 mg at 09/20/23 0854    sodium chloride 0.9 % flush 10 mL  10 mL Intravenous  "Q12H Irvin Gilliam MD   10 mL at 09/20/23 0854    sodium chloride 0.9 % flush 10 mL  10 mL Intravenous PRN Irvin Gilliam MD        sodium chloride 0.9 % infusion 40 mL  40 mL Intravenous PRN Irvin Gilliam MD            Physician Progress Notes (last 72 hours)        Jacinta Jamison MD at 09/20/23 0811          [facility]  IP Progress Note      Chief Complaint: Follow-up of non-STEMI/CAD/CRF's    Subjective   The patient's condition further deteriorated overnight.  Complaining of chest pressure and shortness of breath and developed acute pulmonary edema/respiratory failure.  Was started on BiPAP and received IV Lasix this morning.  Currently on BiPAP.  Family in the room.    Objective     Blood pressure 130/55, pulse 77, temperature 98.1 °F (36.7 °C), temperature source Oral, resp. rate 20, height 154.9 cm (61\"), weight 78.6 kg (173 lb 4.5 oz), SpO2 (!) 87 %.     Intake/Output Summary (Last 24 hours) at 9/20/2023 0811  Last data filed at 9/20/2023 0444  Gross per 24 hour   Intake 1097.4 ml   Output 400 ml   Net 697.4 ml       Physical Exam:  General: On BiPAP, sitting up in bed  Neck: no JVD.  Chest: Decreased breath sounds at bases, scattered rhonchi and basilar crackles.  Cardiovascular: Normal S1 and S2, distant  Extremities: No edema.      Results Review:     I reviewed the patient's new clinical results.    Results from last 7 days   Lab Units 09/20/23  0321   WBC 10*3/mm3 13.73*   HEMOGLOBIN g/dL 8.8*   HEMATOCRIT % 28.8*   PLATELETS 10*3/mm3 264     Results from last 7 days   Lab Units 09/20/23  0321 09/19/23  0254 09/18/23  0317   SODIUM mmol/L 141   < > 141   POTASSIUM mmol/L 4.4   < > 4.6   CHLORIDE mmol/L 107   < > 106   CO2 mmol/L 21.0*   < > 26.0   BUN mg/dL 49*   < > 37*   CREATININE mg/dL 2.09*   < > 1.89*   CALCIUM mg/dL 8.7   < > 8.7   BILIRUBIN mg/dL  --   --  0.4   ALK PHOS U/L  --   --  58   ALT (SGPT) U/L  --   --  15   AST (SGOT) U/L  --   --  24 "   GLUCOSE mg/dL 218*   < > 113*    < > = values in this interval not displayed.     Results from last 7 days   Lab Units 09/20/23  0321   SODIUM mmol/L 141   POTASSIUM mmol/L 4.4   CHLORIDE mmol/L 107   CO2 mmol/L 21.0*   BUN mg/dL 49*   CREATININE mg/dL 2.09*   GLUCOSE mg/dL 218*   CALCIUM mg/dL 8.7     Results from last 7 days   Lab Units 09/13/23 2219 09/13/23  1804   INR  1.22* 1.15     Lab Results   Component Value Date    CKTOTAL 236 (H) 09/09/2023    TROPONINT 872 (C) 09/14/2023     Results from last 7 days   Lab Units 09/13/23 2219   TSH uIU/mL 0.515     Results from last 7 days   Lab Units 09/14/23  0049   CHOLESTEROL mg/dL 122   TRIGLYCERIDES mg/dL 64   HDL CHOL mg/dL 67*   LDL CHOL mg/dL 41           Tele: Sinus Rythym      Assessment:  Non ST Elevated MI.  [facility] 9/12/2023 at OSH with multiple, re-occlusions, no intervention  [facility] 9/15/2023: 80% ostial left main stenosis s/p OTC guided PCI with TOMAS and attempted PTCA of mid-circumflex, but balloon would not cross.   History of CAD s/p remote CABG. Normal EF.  Acute HFpEF/pulmonary edema/respiratory failure  Hypertension.  Dyslipidemia.   Diabetes.   SILVANA on CKD, bumex discontinued over the weekend.   Anemia.   Plan:  Agree with IV Lasix and metolazone.  Despite SILVANA patient needs optimal diuresis to stabilize her respiratory status.  This may lead to further decline in renal function.  Not sure that eventual dialysis would be a good option in this critically ill patient with multiple medical issues.  For now the goal is to achieve a euvolemic status and to stabilize her respiratory condition.  As previously mentioned the patient does not have any further revascularization options by surgical catheter-based approach and she has been maximally revascularized.  Her medical therapy has also been maximally optimized  Continue current medical management including dual antiplatelet therapy, current dose of Ranexa and Imdur.  Increase metoprolol to 100 mg twice  daily for optimal beta-blockade to achieve resting heart rate of 60.  Appreciate palliative care involvement.      Jacinta Jamison MD, [facility], [facility]                                      Electronically signed by Jacinta Jamison MD at 09/20/23 0851       Jessu Manuel Crabtree MD at 09/19/23 4789            Intensive Care Follow-up     Hospital:  LOS: 6 days   Ms. Betty Rod, 82 y.o. female is followed for:   NSTEMI (non-ST elevated myocardial infarction)     Subjective       History of present illness:   82-year-old female with past medical history of coronary disease s/p coronary bypass graft surgery transferred to [facility] for higher level of care.  Patient was admitted to [facility] on September 8 with non-ST elevation MI and left heart cath on September 12 showed multiple occlusions.  Patient was taken to Cath Lab and found to have left main stenosis was where she underwent PCI with drug-eluting stent.  PTCA of mid circumflex was attempted but unsuccessful.  Since the procedure patient has continued to Have chest pains.  Cardiology team following.  Pt was started on Ranexa.     Subjective   Interval History:  Overnight patient did Ok. Still complains of chest pressure but feels a little bit better. No other new issues or hemodynamic instability.          The patient's past medical, surgical and social history were reviewed and updated in Epic as appropriate.    Objective   Objective     Infusions:       Medications:  allopurinol, 100 mg, Oral, Daily  amLODIPine, 5 mg, Oral, Q24H  aspirin, 81 mg, Oral, Daily  atorvastatin, 40 mg, Oral, Nightly  clopidogrel, 75 mg, Oral, Daily  insulin lispro, 2-7 Units, Subcutaneous, 4x Daily AC & at Bedtime  isosorbide mononitrate, 120 mg, Oral, Q24H  metoprolol tartrate, 100 mg, Oral, Q12H  pantoprazole, 40 mg, Oral, Q AM  pharmacy consult - MTM, , Does not apply, Daily  ranolazine, 500 mg, Oral, Q12H  senna-docusate sodium, 2 tablet, Oral, BID  sodium chloride, 10  "mL, Intravenous, Q12H        Vital Sign Min/Max for last 24 hours  Temp  Min: 97.4 °F (36.3 °C)  Max: 98.7 °F (37.1 °C)   BP  Min: 82/58  Max: 130/72   Pulse  Min: 58  Max: 78   Resp  Min: 18  Max: 24   SpO2  Min: 88 %  Max: 98 %   Flow (L/min)  Min: 3  Max: 6       Input/Output for last 24 hour shift  09/18 0701 - 09/19 0700  In: -   Out: 500 [Urine:500]      Objective:  Vital signs: (most recent): Blood pressure (!) 82/58, pulse 58, temperature 97.4 °F (36.3 °C), temperature source Axillary, resp. rate 20, height 154.9 cm (61\"), weight 77.4 kg (170 lb 10.2 oz), SpO2 95 %.          General Appearance: Awake, alert, in no acute distress  Lungs:   B/L Breath sounds present with decreased breath sounds on bases, no wheezing heard, occ basilar crackles.   Heart: S1 and S2 present, no murmur  Abdomen: Soft, nontender, no guarding or rigidity, bowel sounds positive.  Extremities:  trace edema, warm to touch.  Neurologic:  Moving all four extremities. Good strength bilaterally.   Psychological: Normal affect, Cooperative      Results from last 7 days   Lab Units 09/19/23  0254 09/16/23  0352 09/15/23  0416   WBC 10*3/mm3 11.53* 13.10* 14.24*   HEMOGLOBIN g/dL 8.8* 9.1* 9.5*   PLATELETS 10*3/mm3 211 188 204       Results from last 7 days   Lab Units 09/19/23  0254 09/18/23  0317 09/17/23  0524 09/16/23  0352   SODIUM mmol/L 141 141 141 140   POTASSIUM mmol/L 5.5* 4.6 4.7 4.9   CO2 mmol/L 25.0 26.0 26.0 21.0*   BUN mg/dL 43* 37* 32* 29*   CREATININE mg/dL 1.91* 1.89* 1.73* 1.56*   MAGNESIUM mg/dL 2.4 2.2 2.1 2.3   PHOSPHORUS mg/dL  --  3.2 3.1 4.3   GLUCOSE mg/dL 150* 113* 169* 108*       Estimated Creatinine Clearance: 21.7 mL/min (A) (by C-G formula based on SCr of 1.91 mg/dL (H)).          Images:   None new    I reviewed the patient's results and images.     Assessment & Plan   Impression        NSTEMI (non-ST elevated myocardial infarction)    CKD (chronic kidney disease)    Coronary artery disease    Diabetes " mellitus    Emphysema of lung    Gout    Hypertension    Atelectasis    Pleural effusion    Acute respiratory failure    Type 1 myocardial infarction       Plan        1.  Patient with coronary disease status post successful PCI of ostial left main stenosis but unsuccessful PCI of circumflex. Cardiology team is following and medical management recommended.  Patient is currently on Ranexa as well.  Feels little better today.  Continue dual antiplatelet agents.  2.  Patient denies any ongoing bleeding.  Mildly anemic with hemoglobin of 8.8.  Will trend for now and if drops any further will consider transfusion.  Discussed with risks and benefits of blood transfusion but now patient is willing to wait for another day and we will see if that helps.    3.  Denies any shortness of breath. Continue DuoNeb as needed.  4.  Creatinine has plateaued.  Potassium level is elevated.  Was given Lokelma and treatment for hyperkalemia.  We will recheck potassium later on today.  Urine output is improving.  We will continue to trend for now.  But did not contrast nephropathy.  5.  Patient apparently has nonspecific pains and has been on Norco at home.  Will resume.  6.  Continue statin. Beta-blocker to continue.  PO Amlodipine.  7.  GI prophylaxis.  8.  Bowel regimen.  9.  As needed Xanax for anxiety.  Looks like patient has been filling this medication pretty consistently at home.    Code status discussed yesterday. Pt is DNR/DNI.     Palliative care consulted per cardiology.     Patient can be transferred out of ICU to telemetry floor. Will sign out to hospitalist team.     Plan of care and goals reviewed with multidisciplinary/antibiotic stewardship team during rounds.   I discussed the patient's findings and my recommendations with patient, family, and nursing staff       Jesus Manuel Crabtree MD, [facility]  Pulmonary, Critical care and Sleep Medicine         Electronically signed by Jesus Manuel Crabtree MD at 09/19/23 1241       Aslam,  "MD Jacinta at 09/19/23 0703          [facility]  IP Progress Note      Chief Complaint: Follow-up of non-STEMI/CAD/CRF's    Subjective   Kept having intermittent chest pressure however overall better after initiation of Ranexa.  Appears quite lethargic.  No shortness of breath    Objective     Blood pressure 112/57, pulse 71, temperature 97.6 °F (36.4 °C), temperature source Axillary, resp. rate 24, height 154.9 cm (61\"), weight 77.4 kg (170 lb 10.2 oz), SpO2 97 %.     Intake/Output Summary (Last 24 hours) at 9/19/2023 0704  Last data filed at 9/18/2023 2200  Gross per 24 hour   Intake --   Output 500 ml   Net -500 ml       Physical Exam:  General: No acute distress.   Neck: no JVD.  Chest:No respiratory distress, breath sounds are slightly diminished at bases, scattered rhonchi.    Cardiovascular: Normal S1 and S2, no murmur, gallop or rub.    Extremities: No edema.    Results Review:     I reviewed the patient's new clinical results.    Results from last 7 days   Lab Units 09/19/23  0254   WBC 10*3/mm3 11.53*   HEMOGLOBIN g/dL 8.8*   HEMATOCRIT % 28.9*   PLATELETS 10*3/mm3 211     Results from last 7 days   Lab Units 09/19/23  0254 09/18/23  0317   SODIUM mmol/L 141 141   POTASSIUM mmol/L 5.5* 4.6   CHLORIDE mmol/L 108* 106   CO2 mmol/L 25.0 26.0   BUN mg/dL 43* 37*   CREATININE mg/dL 1.91* 1.89*   CALCIUM mg/dL 8.9 8.7   BILIRUBIN mg/dL  --  0.4   ALK PHOS U/L  --  58   ALT (SGPT) U/L  --  15   AST (SGOT) U/L  --  24   GLUCOSE mg/dL 150* 113*     Results from last 7 days   Lab Units 09/19/23  0254   SODIUM mmol/L 141   POTASSIUM mmol/L 5.5*   CHLORIDE mmol/L 108*   CO2 mmol/L 25.0   BUN mg/dL 43*   CREATININE mg/dL 1.91*   GLUCOSE mg/dL 150*   CALCIUM mg/dL 8.9     Results from last 7 days   Lab Units 09/13/23  2219 09/13/23  1804   INR  1.22* 1.15     Lab Results   Component Value Date    CKTOTAL 236 (H) 09/09/2023    TROPONINT 872 (C) 09/14/2023     Results from last 7 days "   Lab Units 09/13/23  2219   TSH uIU/mL 0.515     Results from last 7 days   Lab Units 09/14/23  0049   CHOLESTEROL mg/dL 122   TRIGLYCERIDES mg/dL 64   HDL CHOL mg/dL 67*   LDL CHOL mg/dL 41           Tele: Sinus Rythym      Assessment:  Non ST Elevated MI.  [facility] 9/12/2023 at OSH with multiple, re-occlusions, no intervention  [facility] 9/15/2023: 80% ostial left main stenosis s/p OTC guided PCI with TOMAS and attempted PTCA of mid-circumflex, but balloon would not cross.   History of CAD s/p remote CABG. Normal EF.  Hypertension.  Dyslipidemia.   Diabetes.   SILVANA on CKD, bumex discontinued over the weekend.   Anemia.   Plan:  The patient does not have any further revascularization options by surgical or catheter-based approach.  She has been maximally revascularized.  Her medical therapy has been optimized.  Continue current medical management including dual antiplatelet therapy, current dose of Ranexa and Imdur.  Increase metoprolol to 100 mg twice daily for optimal beta-blockade to achieve resting heart rate of 60.  Palliative care consultation to discuss goals of treatment, and limitations of care with the patient and her family.  She may benefit from blood transfusion to maintain hemoglobin over 10 to optimize O2 delivery.  Will let the ICU team decide.  Out of bed to chair, increase activities as tolerated.  Okay to transfer to telemetry from cardiology standpoint.    Jacinta Jamison MD, [facility], [facility]                                      Electronically signed by Jacinta Jamison MD at 09/19/23 0829       Jesus Manuel Crabtree MD at 09/18/23 1130            Intensive Care Follow-up     Hospital:  LOS: 5 days   Ms. Betty Rod, 82 y.o. female is followed for:   NSTEMI (non-ST elevated myocardial infarction)     Subjective       History of present illness:   82-year-old female with past medical history of coronary disease s/p coronary bypass graft surgery transferred to [facility] for higher level of care.  Patient was  "admitted to [facility] on September 8 with non-ST elevation MI and left heart cath on September 12 showed multiple occlusions.  Patient was taken to Cath Lab and found to have left main stenosis was where she underwent PCI with drug-eluting stent.  PTCA of mid circumflex was attempted but unsuccessful.  Since the procedure patient has continued to Have chest pains.  Cardiology team following.      Subjective   Interval History:  Overnight patient continued to have recurrent chest pains.  Requiring nitroglycerin and morphine intermittently.  Has also been tried on Xanax.  Creatinine slightly worse.  Nitroglycerin drip has been stopped for few days.                 The patient's past medical, surgical and social history were reviewed and updated in Epic as appropriate.    Objective   Objective     Infusions:  nitroglycerin, 10-50 mcg/min, Last Rate: Stopped (09/14/23 1017)      Medications:  allopurinol, 100 mg, Oral, Daily  amLODIPine, 5 mg, Oral, Q24H  aspirin, 81 mg, Oral, Daily  atorvastatin, 40 mg, Oral, Nightly  clopidogrel, 75 mg, Oral, Daily  insulin lispro, 2-7 Units, Subcutaneous, 4x Daily AC & at Bedtime  isosorbide mononitrate, 120 mg, Oral, Q24H  metoprolol tartrate, 50 mg, Oral, Q12H  pantoprazole, 40 mg, Intravenous, Q AM  pharmacy consult - [place], , Does not apply, Daily  senna-docusate sodium, 2 tablet, Oral, BID  sodium chloride, 10 mL, Intravenous, Q12H        Vital Sign Min/Max for last 24 hours  Temp  Min: 98.1 °F (36.7 °C)  Max: 98.6 °F (37 °C)   BP  Min: 99/56  Max: 146/75   Pulse  Min: 60  Max: 88   Resp  Min: 16  Max: 22   SpO2  Min: 84 %  Max: 95 %   Flow (L/min)  Min: 2  Max: 3       Input/Output for last 24 hour shift  09/17 0701 - 09/18 0700  In: 250 [P.O.:250]  Out: 650 [Urine:650]      Objective:  Vital signs: (most recent): Blood pressure 124/67, pulse 70, temperature 98.2 °F (36.8 °C), temperature source Oral, resp. rate 20, height 154.9 cm (61\"), weight 76.4 kg (168 lb 6.9 oz), " SpO2 (!) 86 %.          General Appearance: Awake, alert, in mild distress  Lungs:   B/L Breath sounds present with decreased breath sounds on bases, no wheezing heard, occ basilar crackles.   Heart: S1 and S2 present, no murmur  Abdomen: Soft, nontender, no guarding or rigidity, bowel sounds positive.  Extremities:  no cyanosis or clubbing,  trace edema, warm to touch.  Pulses: Positive and symmetric.  Neurologic:  Moving all four extremities. Good strength bilaterally.   Psychological: Normal affect, Cooperative      Results from last 7 days   Lab Units 09/16/23  0352 09/15/23  0416 09/13/23  2219   WBC 10*3/mm3 13.10* 14.24* 16.28*   HEMOGLOBIN g/dL 9.1* 9.5* 9.2*   PLATELETS 10*3/mm3 188 204 193     Results from last 7 days   Lab Units 09/18/23  0317 09/17/23  0524 09/16/23  0352   SODIUM mmol/L 141 141 140   POTASSIUM mmol/L 4.6 4.7 4.9   CO2 mmol/L 26.0 26.0 21.0*   BUN mg/dL 37* 32* 29*   CREATININE mg/dL 1.89* 1.73* 1.56*   MAGNESIUM mg/dL 2.2 2.1 2.3   PHOSPHORUS mg/dL 3.2 3.1 4.3   GLUCOSE mg/dL 113* 169* 108*     Estimated Creatinine Clearance: 21.8 mL/min (A) (by C-G formula based on SCr of 1.89 mg/dL (H)).          Images:   EKG reviewed and showed ST changes in V1 to V3 with reciprocal changes in lateral leads.  Normal sinus rhythm QTc acceptable.    I reviewed the patient's results and images.     Assessment & Plan   Impression        NSTEMI (non-ST elevated myocardial infarction)    CKD (chronic kidney disease)    Coronary artery disease    Diabetes mellitus    Emphysema of lung    Gout    Hypertension    Atelectasis    Pleural effusion    Acute respiratory failure    Type 1 myocardial infarction       Plan        1.  Patient with coronary disease status post successful PCI of ostial left main stenosis but unsuccessful PCI of circumflex.  Continues to have recurrent chest discomfort requiring morphine and nitroglycerin.  EKG again showed some ST changes and possible endocardial injury.  Cardiology  "team is following closely.  Medications are being adjusted.  We will continue to monitor for now.  2.  Continue dual antiplatelet agents.  3.  Patient complains of \"chest congestion\".  No wheezing heard today.  Continue incentive spirometry.  Will place on DuoNeb as needed.  4.  Patient was diuresed but creatinine is going up.  Will continue to hold diuresis for now.  Monitor urine output.  May need to give some fluids back.  Will also be worried about contrast nephropathy.  5.  Continue statin.  6.  Beta-blocker to continue.  Amlodipine to continue as well.  7.  GI prophylaxis.  8.  Bowel regimen.  9.  As needed Xanax for anxiety.  Looks like patient has been filling this medication pretty consistently at home.    Extensive discussion held with patient and family.  Guarded prognosis discussed.  CODE STATUS discussed as well.  After discussion patient told me and her family that she does not want to be \"tortured anymore and found to be treated humanely\" we discussed what it means for her and she told us that she would not want to be.  Placed on ventilator or undergo chest compressions in the event of cardiac arrest.  Her family corroborates that.  Patient would not want any dialysis either.  CODE STATUS changed to reflect patient's wishes in the chart.      Plan of care and goals reviewed with multidisciplinary/antibiotic stewardship team during rounds.   I discussed the patient's findings and my recommendations with patient, family, and nursing staff       Time spent  38 min  (exclusive of procedure time)  including high complexity decision making to assess, manipulate, and support vital organ system failure in this individual who has impairment of one or more vital organ systems such that there is a high probability of imminent or life threatening deterioration in the patient’s condition.      Jesus Manuel Crabtree MD, [facility]  Pulmonary, Critical care and Sleep Medicine         Electronically signed by Jesus Manuel Crabtree, " "MD at 09/18/23 1146       Sandra Roman PA-C at 09/18/23 0847       Attestation signed by Jacinta Jamison MD at 09/19/23 1202    I have reviewed this documentation and agree.                    [facility]  PROGRESS NOTE    Date of Admission: 9/13/2023  Date of Service: 09/18/23    Primary Care Physician: Boy Esparza PA    Chief Complaint: Follow up NSTEMI     Subjective      Patient continues to have intermittent episodes of chest pain. States pain is worse with ambulation and with changing positions in bed. However, she does describe this pain as \"congestion feeling.\" No chest pain at rest.     Objective   Vitals: /77 (BP Location: Right arm, Patient Position: Lying)   Pulse 88   Temp 98.2 °F (36.8 °C) (Oral)   Resp 20   Ht 154.9 cm (61\")   Wt 76.4 kg (168 lb 6.9 oz)   SpO2 94%   BMI 31.82 kg/m²     Physical Exam:  GENERAL: in no acute distress.   HEENT: Normocephalic, no jugular venous distention  HEART: Regular rhythm, normal rate, and no murmurs, gallops, or rubs.   LUNGS: Clear to auscultation bilaterally. No wheezing, rales or rhonchi.  EXTREMITIES: No clubbing, cyanosis, or edema noted. Ecchymosis on bilateral upper extremities.     Results:  Results from last 7 days   Lab Units 09/16/23  0352 09/15/23  0416 09/13/23  2219   WBC 10*3/mm3 13.10* 14.24* 16.28*   HEMOGLOBIN g/dL 9.1* 9.5* 9.2*   HEMATOCRIT % 29.4* 31.1* 29.4*   PLATELETS 10*3/mm3 188 204 193     Results from last 7 days   Lab Units 09/18/23  0317 09/17/23  0524 09/16/23  0352   SODIUM mmol/L 141 141 140   POTASSIUM mmol/L 4.6 4.7 4.9   CHLORIDE mmol/L 106 106 107   CO2 mmol/L 26.0 26.0 21.0*   BUN mg/dL 37* 32* 29*   CREATININE mg/dL 1.89* 1.73* 1.56*   GLUCOSE mg/dL 113* 169* 108*      Lab Results   Component Value Date    CHOL 122 09/14/2023    TRIG 64 09/14/2023    HDL 67 (H) 09/14/2023    LDL 41 09/14/2023    AST 24 09/18/2023    ALT 15 09/18/2023     Results from last 7 days   Lab " Units 09/14/23  0049   HEMOGLOBIN A1C % 6.20*     Results from last 7 days   Lab Units 09/14/23  0049   CHOLESTEROL mg/dL 122   TRIGLYCERIDES mg/dL 64   HDL CHOL mg/dL 67*   LDL CHOL mg/dL 41     Results from last 7 days   Lab Units 09/13/23 2219   TSH uIU/mL 0.515         Results from last 7 days   Lab Units 09/14/23  0344 09/13/23  2219 09/13/23  1804   PROTIME Seconds  --  15.5* 12.3   INR   --  1.22* 1.15   APTT seconds 72.7  --   --      Results from last 7 days   Lab Units 09/14/23  0049 09/13/23 2219   HSTROP T ng/L 872* 796*     Results from last 7 days   Lab Units 09/13/23 2219   PROBNP pg/mL 16,577.0*         Intake/Output Summary (Last 24 hours) at 9/18/2023 0835  Last data filed at 9/17/2023 2200  Gross per 24 hour   Intake 250 ml   Output 650 ml   Net -400 ml       I personally reviewed the patient's EKG/Telemetry data    Radiology Data:   Results for orders placed during the hospital encounter of 09/13/23    Adult Transthoracic Echo Complete W/ Cont if Necessary Per Protocol    Interpretation Summary    Left ventricular systolic function is normal. Estimated left ventricular EF = 65%    Left ventricular wall thickness is consistent with mild concentric hypertrophy.    Left ventricular diastolic function is consistent with (grade I) impaired relaxation.    The right ventricular cavity is borderline dilated.    Mild biatrial enlargement.    Aortic sclerosis with trace aortic insufficiency, no evidence of aortic stenosis.    Mild to moderate mitral regurgitation.    Mild tricuspid regurgitation with RVSP 48 mmHg.      Current Medications:  allopurinol, 100 mg, Oral, Daily  amLODIPine, 5 mg, Oral, Q24H  aspirin, 81 mg, Oral, Daily  atorvastatin, 40 mg, Oral, Nightly  clopidogrel, 75 mg, Oral, Daily  insulin lispro, 2-7 Units, Subcutaneous, 4x Daily AC & at Bedtime  isosorbide mononitrate, 120 mg, Oral, Q24H  metoprolol tartrate, 50 mg, Oral, Q12H  pantoprazole, 40 mg, Intravenous, Q AM  pharmacy consult  - MTM, , Does not apply, Daily  senna-docusate sodium, 2 tablet, Oral, BID  sodium chloride, 10 mL, Intravenous, Q12H      nitroglycerin, 10-50 mcg/min, Last Rate: Stopped (23 1017)        Assessment:  Non ST Elevated MI.  [facility] 2023 at OSH with multiple, re-occlusions, no intervention  [facility] 9/15/2023: 80% ostial left main stenosis s/p OTC guided PCI with TOMAS and attempted PTCA of mid-circumflex, but would not cross.    History of CAD s/p remote CABG. Normal EF.  Hypertension.  Dyslipidemia.   Diabetes.   SILVANA on CKD, bumex discontinued over the weekend.   Anemia.     Plan:  Add Ranexa 500mg q 12 hours for additional antianginal therapy.   Continue imdur at current dose.   As needed nitropaste 1 inch q 6 hours for chest pain.   Continue to hold bumex due to increased creatinine. Monitor renal function.   Chest pain likely secondary to mid-circumflex occlusion. Continue medical management for now.   May need nephrology involvement.      Sandra Roman PA-C      Electronically signed by Jacinta Jamison MD at 23 1209          Consult Notes (last 7 days)        Jacinta Jamison MD at 23 0851        Consult Orders    1. Inpatient Cardiology Consult [[id number]] ordered by Slade Pollack APRN at 23 2200                   Date of Hospital Visit: 23  Encounter Provider: Makenna Rios PA-C  Place of Service: [facility]  Patient Name: Betty Rod  :1940  Referral Provider: Boy Yao MD  Primary Care Provider: Boy Esparza PA-C, [facility] in [place], [place]    Chief Complaint/Reason for Consultation: NSTEMI w/ persistent chest pain, recent negative [facility]    Problem List:  Coronary artery disease  CABG x2 SVG to diagonal, LIMA to LAD, 20+ years ago, [facility]  NSTEMI, 2023  Echocardiogram (2023): EF 55 +/- 5%. Moderate, concentric, LVH.  Increased LA pressure (grade II diastolic dysfunction).  Moderate LA enlargement.  Mild MR and  TR.  [facility] (09/12/2023): Severe multivessel CAD. Severe ostial left main stenosis.  of the LAD, LCX and RCA. Occluded SVG to diagonal and OM. Patent LIMA to LAD.  Angiography reviewed by Dr. Jamison.  The native coronary arteries are all functionally occluded.  LIMA to LAD is patent.  The LAD also gives some collaterals of the circumflex and the RCA distribution.  No further interventional or surgical revascularization options are available.  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   Gout  Osteoarthritis  Anxiety/depression  Chronic pain  Former cigarette smoker, quit 2003  Surgical history:  CABG  Gallbladder surgery    Cardiac Risk Factors: CAD, HTN, HDL, DM, former tobacco use, advanced age    History of Present Illness:  Betty Rod is a 82 y.o. female with the above noted medical history who presented to [facility] with complaints of 2 days of chest pain and vomiting on 09/08. He was found to have an NSTEMI with HSTof 500. She was then transferred to [facility] for further cardiac evaluation. She was started on IV heparin and antiplatelet therapy. She was found to have an elevated Cr which delayed the heart catheterization which was eventually performed on 09/12 with the above listed results. Chest XR on 09/13 revealed bilateral opacity possible pneumonia vs pulmonary edema. Nephrology was consulted due to an SILVANA. Patient was started on a nitroglycerin gtt, p.o. Imdur, 40 mg IV lasix x1, and antibiotics. Chest persisted while on nitro gtt and Imdur, and repeat troponin increased to 1,600 for which heparin was restarted. Cardiologist, Dr. Watson, recommended a high risk PCI evaluation which could not be done  "there so she was then transferred here to the ICU overnight for higher level of care.     Upon evaluation at [facility], findings were significant for an elevated pro-BNP of 16,577, , 872, troponin delta 76, Cr 1.65, elevated WBC count, anemia with H&H 9.2, 29.4, and evidence of a UTI. CXR with mild bilateral basilar atelectasis greater on the left than the right with a trace left effusion. Respiratory PCR negative.     Today, Ms. Rod reports that she is having intermittent chest pain. Daughters are present in room to assist with history as patient is somewhat confused. Patient states she is \"mentally off\" today and is having trouble answering questions due to this. Daughter reporting she had a CABG ~20 years ago here at [facility] and had a \"bad experience with psychosis in the ICU.\" Her symptoms prior to the bypass are similar to her current symptoms with nausea, vomiting and chest pain. After her CABG, the patient did not follow-up with a cardiologist as she \"refused to go.\" The only medical provider she sees is her PCP named Boy Esparza PA-C with [facility] in [place], [place].     In terms of her cardiac history, she is unsure if she has had any previous stents, denies any known CVA, TIA, arrhythmias, heart failure or pulmonary emboli. Former smoker with cessation in 2003. Daughter states she has a history of several clots in her legs over the years. Her BLE edema is well controlled at home on Bumex. At baseline, the patient is able to ambulate in her home with occasional assistance of her walker. Her family helps her with her house chores and errands as needed. She denies jaw/arm/neck pain, palpitations, shortness of breath, edema, lightheadedness, dizziness, presyncope, syncope, diarrhea, abdominal discomfort, numbness or tingling. In terms of her wishes, patient is hesitant about having another CABG and declines being on dialysis or intubated for a long time if her status required this.    At " "the time of my evaluation patient was free of any chest pain and asked whether she can eat.  Her lifestyle is sedentary however she is independent for activities of daily living.      Past Medical History:   Diagnosis Date    Anxiety     CKD (chronic kidney disease)     Coronary artery disease     Diabetes mellitus     Emphysema of lung     Gout     Hypertension     Osteoarthritis      Past Surgical History:   Procedure Laterality Date    CARDIAC SURGERY      CORONARY ARTERY BYPASS GRAFT      GALLBLADDER SURGERY       No medications prior to admission.     allopurinol, 100 mg, Oral, Daily  aspirin, 81 mg, Oral, Daily  atorvastatin, 40 mg, Oral, Nightly  metoprolol tartrate, 12.5 mg, Oral, Q12H  senna-docusate sodium, 2 tablet, Oral, BID  sodium chloride, 10 mL, Intravenous, Q12H      Social History     Socioeconomic History    Marital status: Unknown   Tobacco Use    Smoking status: Former     Types: Cigarettes     Quit date:      Years since quittin.7    Smokeless tobacco: Never   Vaping Use    Vaping Use: Never used   Substance and Sexual Activity    Alcohol use: Never    Drug use: Never    Sexual activity: Never     Family History   Problem Relation Age of Onset    Cancer Mother     Heart failure Mother     Heart failure Father     Cancer Sister     Cancer Brother        REVIEW OF SYSTEMS:   12 point ROS was performed and is negative except as outlined in HPI     Objective:     Vitals:    23 0700 23 0715 23 0730 23 0745   BP: 171/82 159/72 121/58 113/52   BP Location:       Patient Position:       Pulse: 90 80 66 66   Resp:       Temp:       TempSrc:       SpO2: 92% 92% 96% 97%   Weight:       Height:         Body mass index is 32.78 kg/m².  Flowsheet Rows      Flowsheet Row First Filed Value   Admission Height 154.9 cm (61\") Documented at 2023 2318   Admission Weight 78.7 kg (173 lb 8 oz) Documented at 2023 2200            Physical Exam   General: No acute distress, " well developed, and well nourished. Appears to be stated age. NC in place.  Skin: Skin is warm and dry. No obvious cyanosis, erythema or pallor.   HEENT: Pupils are equal, round and reactive to light. Atraumatic, normocephalic, no conjunctival pallor and no scleral icterus.   Neck: Supple. No JVD. Normal carotid upstrokes with no carotid bruits.    Chest: No respiratory distress. No chest wall tenderness. Diminished breath sounds at bases bilaterally. Breath sounds are normal.  Few scattered rhonchi  Cardiovascular: Normal S1 and S2. No murmur, gallop or rub. PMI is not displaced.    Pulses: Radial pulses are 2+ and equal bilaterally. DP and PT pulses are 1+ and symmetric.    Abdomen: Soft, nontender, normal bowel sounds.   Musculoskeletal/Extremities:  No clubbing, cyanosis or edema. No gross deformity.   Neurological: Alert and oriented to person, place, and time. Mild deficit in cognition.  Psychiatric: Somewhat anxious. Normal mood and affect. Speech and behavior is normal.    Lab Review:   Results from last 7 days   Lab Units 09/13/23 2219   SODIUM mmol/L 141   POTASSIUM mmol/L 4.6   CHLORIDE mmol/L 107   CO2 mmol/L 24.0   BUN mg/dL 20   CREATININE mg/dL 1.65*   GLUCOSE mg/dL 118*   CALCIUM mg/dL 8.6         Lab 09/14/23  0049 09/13/23 2219 09/13/23 1804 09/08/23 1923   PROBNP  --  16,577.0*  --   --    HSTROP T 872* 796*  --   --    PROTIME  --  15.5* 12.3 11.9   INR  --  1.22* 1.15 1.11     Results from last 7 days   Lab Units 09/13/23 2219   WBC 10*3/mm3 16.28*   HEMOGLOBIN g/dL 9.2*   HEMATOCRIT % 29.4*   PLATELETS 10*3/mm3 193     Results from last 7 days   Lab Units 09/14/23  0344 09/13/23 2219 09/13/23 1804 09/08/23 1923   INR   --  1.22* 1.15 1.11   APTT seconds 72.7  --   --   --      Results from last 7 days   Lab Units 09/13/23 2219   MAGNESIUM mg/dL 1.9     Lab Results   Component Value Date    CHOL 122 09/14/2023    TRIG 64 09/14/2023    HDL 67 (H) 09/14/2023    LDL 41 09/14/2023     Lab  Results   Component Value Date    HGBA1C 6.20 (H) 09/14/2023     Respiratory PCR: negative    CXR(09/14/2023):  Impression:  Mild bilateral basilar atelectasis greater on the left than the right with a trace left effusion.     Echocardiogram (09/09/2023): EF 55 +/- 5%. Moderate, concentric, LVH.  Increased LA pressure (grade II diastolic dysfunction).  Moderate LA enlargement.  Mild MR and TR.    EKG (09/14/2023): EKG shows sinus rhythm with nonspecific ST changes.       Assessment:   NSTEMI/Coronary artery disease s/p CABG/Progressive angina  CABG 20+ years ago. Symptoms prior to CABG: nausea, vomiting and chest pain.  Echocardiogram (09/09/2023), [facility]: EF 55 +/- 5%. Moderate, concentric, LVH.  Increased LA pressure (grade II diastolic dysfunction).  Moderate LA enlargement.  Mild MR and TR.  [facility] (09/12/2023): Severe multivessel CAD. Severe ostial left main stenosis.  of the LAD, LCX and RCA. Occluded SVG to diagonal and OM. Patent LIMA to LAD.  EKG without acute ischemic changes.  , 872  Intermittent chest pain which has now resolved..  NTG gtt discontinued due to national shortage of IV NTG and replaced with nitroglycerin paste due to national shortage of IV NTG patient is currently chest pain-free    Bilateral atelectasis/Trace left effusion/Acute hypoxemic respiratory failure  Not on oxygen at home.  On Bumex 0.5 mg daily at home for fluid retention  Pro-BNP 16,577  Currently on 6 L/min NC with SpO2 of 97%    Hypertension  Home BP medications: amlodipine 5 mg daily, ramipril 2.5 mg daily and metoprolol tartrate 25 mg daily  Current BP: 136/59 mmHg, 73 bpm    Hyperlipidemia LDL goal <70  On pravastatin 40 mg daily at home.  Lipid panel (09/14/2023): On target    Plan:   Patient's coronary angiography was reviewed, she has functionally occluded native coronary arteries with long segments of occlusions as well as severe left main coronary artery stenosis.  Anatomy is not suitable for  surgical or catheter-based revascularization.    All of the vein bypass grafts are occluded.  Fortunately she has a patent LIMA graft to the LAD, the LAD is also serving some collaterals to the occluded circumflex and RCA territory.    Continue medical therapy is her best, safest and probably the only treatment option at this time.    Since she is free of chest pain, we will continue current medical management, restart home dose of lisinopril and amlodipine.  Increase metoprolol to 25 mg twice daily.   Continue Nitropaste which will be transitioned to Imdur prior to discharge.    Continue IV heparin for another 24 hours, will give loading dose of Plavix followed by 75 mg daily as well as low-dose aspirin for dual antiplatelet therapy   Continue current statin therapy.  Lipid profile is on target.  Goal of treatment is optimal antianginal therapy to adequately control her angina.    We will obtain echocardiogram to assess current LV function and to further guide her medical management.    Further optimization of antianginal therapy will include adjustment of dose of nitrates and addition of Ranexa.    Thank you for this consultation, we will follow.      Makenna Rios PA-C functioning as a scribe for Jacinta Jamison MD, FACC.    IJacinta MD, personally performed the services described in this documentation as scribed by the above named individual in my presence, and it is both accurate and complete.  9/14/2023  17:05 EDT      Electronically signed by Jacinta Jamison MD at 09/14/23 1703       Masha Snowden APRN at 09/19/23 0850        Consult Orders    1. Inpatient Palliative Care MD Consult [[id number]] ordered by Jacinta Jamison MD at 09/19/23 0833                 Palliative Care Initial Consult   Attending Physician: Eliane Garcia DO  Referring Provider: Dr. Jacinta Jamison    Reason for Referral:  assistance with clarification of goals of care    Code Status:   Code Status and Medical Interventions:   Ordered  at: 09/18/23 1146     Medical Intervention Limits:    NO intubation (DNI)    NO dialysis     Level Of Support Discussed With:    Patient    Next of Kin (If No Surrogate)     Code Status (Patient has no pulse and is not breathing):    No CPR (Do Not Attempt to Resuscitate)     Medical Interventions (Patient has pulse or is breathing):    Limited Support      Advanced Directives: Advance Directive Status: Patient does not have advance directive   Family/Support: Yasmin Huff (dtr), Norma Winn (dtr)  Goals of Care: TBD.    HPI: Betty Rod is a 82 y.o. female with PMH significant for CAD s/p CABG, CKD, DM, emphysema, gout, HTN. Patient transferred from [facility] on 9/13 after LHC showing multiple re-occlusions and no intervention performed, increasing chest pain and troponin elevation prompted transfer to higher level of care. Patient with LHC on 9/15 s/p OTC guided PCI with TOMAS and attempted PTCA of mid-circumflex. No further revascularization options by surgical or catheter-based approach per Cardiology. Medical therapy optimized per Cardiology. Palliative Care consulted for C in the context of complex medical decision making.  Patient receiving Xanax 0.5mg PO TID prn x2 doses in the last 24 hours. Morphine 2mg IV q2 hours prn pain x 2 doses in the last 24 hours. Receiving Ranexa 500mg q 12 hours.   Patient worked with PT, recommending discharge to SNF. Patient up in chair at time of visit, very drowsy after working with PT. Patient reports SOB currently on 4LNC, denies chest pain. Patient previously lived alone.     ROS: +debility. +SOB, currently on 4LNC. +anxiety. Denies chest pain, N/V/D, constipation. LBM 9/17.       Past Medical History:   Diagnosis Date    Anxiety     CKD (chronic kidney disease)     Coronary artery disease     Diabetes mellitus     Emphysema of lung     Gout     Hypertension     Osteoarthritis      Past Surgical History:   Procedure Laterality Date    CARDIAC SURGERY      CORONARY  "ARTERY BYPASS GRAFT      GALLBLADDER SURGERY       Social History     Socioeconomic History    Marital status:    Tobacco Use    Smoking status: Former     Types: Cigarettes     Quit date: 2000     Years since quittin.7    Smokeless tobacco: Never   Vaping Use    Vaping Use: Never used   Substance and Sexual Activity    Alcohol use: Never    Drug use: Never    Sexual activity: Never     Family History   Problem Relation Age of Onset    Cancer Mother     Heart failure Mother     Heart failure Father     Cancer Sister     Cancer Brother        Allergies   Allergen Reactions    Amoxicillin Other (See Comments)     unknown       Current medication reviewed for route, type, dose and frequency and are current per MAR at time of dictation.    Palliative Performance Scale Score:  40%    /60   Pulse 78   Temp 97.6 °F (36.4 °C) (Axillary)   Resp 24   Ht 154.9 cm (61\")   Wt 77.4 kg (170 lb 10.2 oz)   SpO2 97%   BMI 32.24 kg/m²     Intake/Output Summary (Last 24 hours) at 2023 0850  Last data filed at 2023 2200  Gross per 24 hour   Intake --   Output 500 ml   Net -500 ml       Physical Exam:    General Appearance:    Patient sitting up in chair, A/C ill appearing, weak, awake, drowsy,  cooperative, NAD   HEENT:    NC/AT, EOMI, anicteric, MMM, face relaxed, NC in place   Neck:   supple, trachea midline, no JVD   Lungs:     CTA bilat, diminished in bases; respirations regular, even and unlabored; RR 20-22 on exam, 4LNC    Heart:    RRR, normal S1 and S2, no M/R/G, HR 57 on monitor   Abdomen:     Normal bowel sounds, soft, nontender, nondistended   G/U:   Deferred   MSK/Extremities:   Wasting, no edema   Pulses:   Pulses palpable and equal bilaterally   Skin:   Warm, dry   Neurologic:   Drowsy, falls asleep while talking with patient, Ox3, cooperative, WILLIAMSON   Psych:   Calm, appropriate         Labs:   Results from last 7 days   Lab Units 23  0254   WBC 10*3/mm3 11.53*   HEMOGLOBIN g/dL 8.8* "   HEMATOCRIT % 28.9*   PLATELETS 10*3/mm3 211     Results from last 7 days   Lab Units 09/19/23  0254   SODIUM mmol/L 141   POTASSIUM mmol/L 5.5*   CHLORIDE mmol/L 108*   CO2 mmol/L 25.0   BUN mg/dL 43*   CREATININE mg/dL 1.91*   GLUCOSE mg/dL 150*   CALCIUM mg/dL 8.9     Results from last 7 days   Lab Units 09/19/23  0254 09/18/23  0317   SODIUM mmol/L 141 141   POTASSIUM mmol/L 5.5* 4.6   CHLORIDE mmol/L 108* 106   CO2 mmol/L 25.0 26.0   BUN mg/dL 43* 37*   CREATININE mg/dL 1.91* 1.89*   CALCIUM mg/dL 8.9 8.7   BILIRUBIN mg/dL  --  0.4   ALK PHOS U/L  --  58   ALT (SGPT) U/L  --  15   AST (SGOT) U/L  --  24   GLUCOSE mg/dL 150* 113*     Imaging Results (Last 72 Hours)       ** No results found for the last 72 hours. **              Lab 09/14/23  0049   HEMOGLOBIN A1C 6.20*         Diagnostics: Reviewed    A:   NSTEMI (non-ST elevated myocardial infarction)    CKD (chronic kidney disease)    Coronary artery disease    Diabetes mellitus    Emphysema of lung    Gout    Hypertension    Atelectasis    Pleural effusion    Acute respiratory failure    Type 1 myocardial infarction     82 y.o. female with NSTEMI, CKD, CAD, acute respiratory failure.   S/S:   Pain -chest pain, resolving  -Ranexa 500mg PO BID  -continue Hydrocodone-Acetaminophen 7.5-325mg PO TID prn moderate pain  -continue Morphine 2mg IV q 2 hours prn severe pain    2. Dyspnea -currently on 4LNC  -may give Hydrocodone or Morphine prn dyspnea  -nebs    3. Anxiety -continue Xanax 0.5mg PO TID prn anxiety    4. Debility -PT/OT following, recommending SNF at d/c    5. GOC -DNR/DNI -per discussion   -patient very drowsy at time of visit, able to complete ROS but unable to engage in GOC discussion  -no LW/ACP, three of four daughters will make majority for decision making if patient is incapacitated  -met with two of four daughters to discuss options regarding GOC  -family stating patient has indicated that she is willing to try SNF prior to return  home  -discussed patient is eligible for hospice services if her goals are comfort focused     P: Introduced Palliative Care and services to patient and two daughters at bedside. Patient very drowsy and able to complete ROS however unable to engage in GOC discussion. Met with two of four daughters in family conference room. Discussed that due to patient's cardiac disease she is eligible for hospice services at home. Reviewed continued Full Treatment with goal to return to hospital versus comfort focused plan of care with hospice services at home or LTC. Reviewed SNF and daughters report patient is agreeable to short term rehab with goal of getting home. They have offered to have patient come live with them. Daughters are agreeable to hospice contact information for [facility] if patient's goals are to not return to the hospital after STR. All questions and concerns addressed.   Thank you for this consult and allowing us to participate in patient's plan of care. Palliative Care Team will continue to follow patient. Please do not hesitate to contact us regarding further symptom management or goals of care needs.  Time: 60 minutes spent reviewing medical and medication records, assessing and examining patient, discussing with family, answering questions, providing some guidance about a plan and documentation of care, and coordinating care with other healthcare members, with > 50% time spent face to face.         DANNY Lenz  9/19/2023      Electronically signed by Masha Snowden APRN at 09/19/23 9012

## 2023-09-20 NOTE — PLAN OF CARE
Goal Outcome Evaluation:  Plan of Care Reviewed With: patient, daughter        Progress: declining  Outcome Evaluation: New palliative consult for assistance with GOC/symptom managment per Dr. Jamison.  No ACP on file.  NOK are jade Huff and Norma Winn.   DANNY Pal and pallaitive RN saw pt today. Palliative care introduced, brochure and meal discount card provided.  Pt. sitting up in bed on 2LNC taking bites of breakfast.  Pt. documented to consume as much as 25% of meals at times.  Pt. appeared to be SOA at rest but refused bipap.  She denied pain but has been having sone chest pain along with anxiety.  She appeared very drowsy this morning.  She was able to answer questions.  She stated in no uncertain terms that she wants to go home and be with her family.  DANNY spoke with daughter at  who is supportive of pt. wishes and plans to take pt home with hospice services.  Hospice liaison spoke with daughter Lucina at  regarding plans to get pt home.  Next available ambulance is Sat.  Palliative care to continue to follow for support and ongoing POC until pt discharges home.      Home with hospice today now.           Problem: Palliative Care  Goal: Enhanced Quality of Life  Intervention: Promote Advance Care Planning  Recent Flowsheet Documentation  Taken 9/20/2023 1227 by Aishwarya Hernandes, RN  Life Transition/Adjustment:   palliative care initiated   palliative care discussed

## 2023-09-20 NOTE — PROGRESS NOTES
Continued Stay Note  [facility]     Patient Name: Betty Rod  MRN: [id number]  Today's Date: 9/20/2023    Admit Date: 9/13/2023    Plan: Home with [facility]   Discharge Plan       Row Name 09/20/23 1106       Plan    Plan Home with [facility]    Plan Comments Hospice referral received, report given by Masha DELGADO, palliative care team. Visit made to pt, pt's daughter Lucina present. Pt alert, stated aware of the hospice referral. Lucina stated pt is aware will be going home for end of life care and does want hospice services, pt verbalized agreement. Lucina stated pt's spouse was a previous hospice pt, so pt and Lucina understand hospice. Discussed where pt will be living, pt will be living in own home at [address] [place] [address], family members will be staying with pt. Discussed equipment needs, pt stated does not want a bipap at home, will need oxygen, hospital bed, overbed table. [facility]SC, transport W/C, nebulizer. Hospice DME will deliver the equipment to pt's home prior to pt's hospital discharge. Pt will need ambulance transportation, spoke with [facility]-informed next available ambulance with [facility] is Saturday. [facility] will [facility], then [facility], then [facility] for earlier transportation. Will inform pt and Lucina when transportation know. Discussed the EMS/DNR form, pt stated is a do not resuscitate and asked Lucina to sign the form, form signed. Will continue to follow. Please call [phone] if can be of further assistance.                   Discharge Codes    No documentation.                 Expected Discharge Date and Time       Expected Discharge Date Expected Discharge Time    Sep 25, 2023               Veronika Kong RN

## 2023-09-20 NOTE — CASE MANAGEMENT/SOCIAL WORK
Continued Stay Note  [facility]     Patient Name: Betty Rod  MRN: [id number]  Today's Date: 9/20/2023    Admit Date: 9/13/2023    Plan: Home with [facility]   Discharge Plan       Row Name 09/20/23 1132       Plan    Plan Home with [facility]    Patient/Family in Agreement with Plan yes    Plan Comments Received update from Veronika Luann with hospice. Patient's plan is to go to her own home with hospice care. Family will stay with patient in her home to assist. Veronika with hospice is working on arranging transport. CM will continue to follow and assist as needed.    Final Discharge Disposition Code 50 - home with hospice      Row Name 09/20/23 1128       Plan    Final Discharge Disposition Code 50 - home with hospice      Row Name 09/20/23 1106       Plan    Plan Home with [facility]    Plan Comments Hospice referral received, report given by Masha DELGADO, palliative care team. Visit made to pt, pt's daughter Lucina present. Pt alert, stated aware of the hospice referral. Lucina stated pt is aware will be going home for end of life care and does want hospice services, pt verbalized agreement. Lucina stated pt's spouse was a previous hospice pt, so pt and Lucina understand hospice. Discussed where pt will be living, pt will be living in own home at [address] [place] [address], family members will be staying with pt. Discussed equipment needs, pt stated does not want a bipap at home, will need oxygen, hospital bed, overbed table. [facility], transport W/C, nebulizer. Hospice DME will deliver the equipment to pt's home prior to pt's hospital discharge. Pt will need ambulance transportation, spoke with [facility]-informed next available ambulance with [facility] is Saturday. [facility] will contact [facility], then [facility], then [facility] for earlier transportation. Will inform pt and Lucina when transportation know. Discussed the EMS/DNR form, pt stated is a do not resuscitate and asked Lucina to sign the form,  form signed. Will continue to follow. Please call [phone] if can be of further assistance.                   Discharge Codes    No documentation.                 Expected Discharge Date and Time       Expected Discharge Date Expected Discharge Time    Sep 25, 2023               Aruna Alvarez RN

## 2023-09-20 NOTE — PROGRESS NOTES
"[facility]  IP Progress Note      Chief Complaint: Follow-up of non-STEMI/CAD/CRF's    Subjective   The patient's condition further deteriorated overnight.  Complaining of chest pressure and shortness of breath and developed acute pulmonary edema/respiratory failure.  Was started on BiPAP and received IV Lasix this morning.  Currently on BiPAP.  Family in the room.    Objective     Blood pressure 130/55, pulse 77, temperature 98.1 °F (36.7 °C), temperature source Oral, resp. rate 20, height 154.9 cm (61\"), weight 78.6 kg (173 lb 4.5 oz), SpO2 (!) 87 %.     Intake/Output Summary (Last 24 hours) at 9/20/2023 0811  Last data filed at 9/20/2023 0444  Gross per 24 hour   Intake 1097.4 ml   Output 400 ml   Net 697.4 ml       Physical Exam:  General: On BiPAP, sitting up in bed  Neck: no JVD.  Chest: Decreased breath sounds at bases, scattered rhonchi and basilar crackles.  Cardiovascular: Normal S1 and S2, distant  Extremities: No edema.      Results Review:     I reviewed the patient's new clinical results.    Results from last 7 days   Lab Units 09/20/23  0321   WBC 10*3/mm3 13.73*   HEMOGLOBIN g/dL 8.8*   HEMATOCRIT % 28.8*   PLATELETS 10*3/mm3 264     Results from last 7 days   Lab Units 09/20/23  0321 09/19/23  0254 09/18/23  0317   SODIUM mmol/L 141   < > 141   POTASSIUM mmol/L 4.4   < > 4.6   CHLORIDE mmol/L 107   < > 106   CO2 mmol/L 21.0*   < > 26.0   BUN mg/dL 49*   < > 37*   CREATININE mg/dL 2.09*   < > 1.89*   CALCIUM mg/dL 8.7   < > 8.7   BILIRUBIN mg/dL  --   --  0.4   ALK PHOS U/L  --   --  58   ALT (SGPT) U/L  --   --  15   AST (SGOT) U/L  --   --  24   GLUCOSE mg/dL 218*   < > 113*    < > = values in this interval not displayed.     Results from last 7 days   Lab Units 09/20/23  0321   SODIUM mmol/L 141   POTASSIUM mmol/L 4.4   CHLORIDE mmol/L 107   CO2 mmol/L 21.0*   BUN mg/dL 49*   CREATININE mg/dL 2.09*   GLUCOSE mg/dL 218*   CALCIUM mg/dL 8.7     Results from last " 7 days   Lab Units 09/13/23 2219 09/13/23  1804   INR  1.22* 1.15     Lab Results   Component Value Date    CKTOTAL 236 (H) 09/09/2023    TROPONINT 872 (C) 09/14/2023     Results from last 7 days   Lab Units 09/13/23 2219   TSH uIU/mL 0.515     Results from last 7 days   Lab Units 09/14/23  0049   CHOLESTEROL mg/dL 122   TRIGLYCERIDES mg/dL 64   HDL CHOL mg/dL 67*   LDL CHOL mg/dL 41           Tele: Sinus Rythym      Assessment:  Non ST Elevated MI.  [facility] 9/12/2023 at OSH with multiple, re-occlusions, no intervention  [facility] 9/15/2023: 80% ostial left main stenosis s/p OTC guided PCI with TOMAS and attempted PTCA of mid-circumflex, but balloon would not cross.   History of CAD s/p remote CABG. Normal EF.  Acute HFpEF/pulmonary edema/respiratory failure  Hypertension.  Dyslipidemia.   Diabetes.   SILVANA on CKD, bumex discontinued over the weekend.   Anemia.   Plan:  Agree with IV Lasix and metolazone.  Despite SILVANA patient needs optimal diuresis to stabilize her respiratory status.  This may lead to further decline in renal function.  Not sure that eventual dialysis would be a good option in this critically ill patient with multiple medical issues.  For now the goal is to achieve a euvolemic status and to stabilize her respiratory condition.  As previously mentioned the patient does not have any further revascularization options by surgical catheter-based approach and she has been maximally revascularized.  Her medical therapy has also been maximally optimized  Continue current medical management including dual antiplatelet therapy, current dose of Ranexa and Imdur.  Increase metoprolol to 100 mg twice daily for optimal beta-blockade to achieve resting heart rate of 60.  Appreciate palliative care involvement.      Jacinta Jamison MD, formerly [facility], [facility]

## 2023-09-20 NOTE — NURSING NOTE
Patient left on EMS gurney with EMS. Lucina, daughter to  meds at pharmacy. Verified she would pick them up before she left. AVS given to Lucina and instructed to contact Hospice when patient arrived home and with any questions. Patient medicated with zofran, morphine and ativan before she left. IV removed.

## 2023-09-20 NOTE — PLAN OF CARE
Goal Outcome Evaluation:   Patient to discharge with home hospice today. EMS transport to patient's home in [place] via [facility] to come at 1400. Equipment to be delivered to home at 1600. Meds to bed in process for xanax and morphine. EMS/DNR sheet signed in chart. Patient and family updated on transport time and plan of care.

## 2023-09-20 NOTE — PROGRESS NOTES
"Palliative Care Daily Progress Note     C/C: Patient requesting to go home.     S: Medical record reviewed. Follow up visit for GOC in the context of complex medical decision making. Events noted. Patient required BiPAP overnight due to increasing pulmonary edema, patient does not want to wear BiPAP. Patient reports that she wants to go home and pass away at home. Verbalizes understanding of end of life and asking to go home.      ROS: +debility. +SOB, currently on 6LNC. +anxiety. Denies chest pain, N/V/D, constipation.     O: Code Status:   Code Status and Medical Interventions:   Ordered at: 09/20/23 0906     Medical Intervention Limits:    NO intubation (DNI)    NO dialysis    NO NIPPV (Non-Invasive Positive Pressure Ventilation)     Level Of Support Discussed With:    Patient    Next of Kin (If No Surrogate)     Code Status (Patient has no pulse and is not breathing):    No CPR (Do Not Attempt to Resuscitate)     Medical Interventions (Patient has pulse or is breathing):    Limited Support      Advanced Directives: Advance Directive Status: Patient does not have advance directive   Goals of Care: Ongoing.   Palliative Performance Scale Score:  30%    /55   Pulse 77   Temp 98.1 °F (36.7 °C) (Oral)   Resp 20   Ht 154.9 cm (61\")   Wt 78.6 kg (173 lb 4.5 oz)   SpO2 (!) 87%   BMI 32.74 kg/m²     Intake/Output Summary (Last 24 hours) at 9/20/2023 0906  Last data filed at 9/20/2023 0444  Gross per 24 hour   Intake 1097.4 ml   Output 400 ml   Net 697.4 ml       PE:  General Appearance:    Patient laying in bed, A/C ill appearing, weak, awake, drowsy,  cooperative, NAD   HEENT:    NC/AT, EOMI, anicteric, MMM, face relaxed, NC in place   Neck:   supple, trachea midline, no JVD   Lungs:     CTA bilat, diminished in bases; respirations regular, even and unlabored; RR 20-22 on exam, 6LNC    Heart:    RRR, normal S1 and S2, no M/R/G, HR 68 on monitor   Abdomen:     Normal bowel sounds, soft, nontender, " nondistended   G/U:   Deferred   MSK/Extremities:   Wasting, no edema   Pulses:   Pulses palpable and equal bilaterally   Skin:   Warm, dry   Neurologic:   Drowsy, falls asleep while talking with patient, Ox3, cooperative, WILLIAMSON   Psych:   Calm, appropriate     Meds: Reviewed and changes noted    Labs:   Results from last 7 days   Lab Units 09/20/23  0321   WBC 10*3/mm3 13.73*   HEMOGLOBIN g/dL 8.8*   HEMATOCRIT % 28.8*   PLATELETS 10*3/mm3 264     Results from last 7 days   Lab Units 09/20/23  0321   SODIUM mmol/L 141   POTASSIUM mmol/L 4.4   CHLORIDE mmol/L 107   CO2 mmol/L 21.0*   BUN mg/dL 49*   CREATININE mg/dL 2.09*   GLUCOSE mg/dL 218*   CALCIUM mg/dL 8.7     Results from last 7 days   Lab Units 09/20/23  0321 09/19/23  0254 09/18/23  0317   SODIUM mmol/L 141   < > 141   POTASSIUM mmol/L 4.4   < > 4.6   CHLORIDE mmol/L 107   < > 106   CO2 mmol/L 21.0*   < > 26.0   BUN mg/dL 49*   < > 37*   CREATININE mg/dL 2.09*   < > 1.89*   CALCIUM mg/dL 8.7   < > 8.7   BILIRUBIN mg/dL  --   --  0.4   ALK PHOS U/L  --   --  58   ALT (SGPT) U/L  --   --  15   AST (SGOT) U/L  --   --  24   GLUCOSE mg/dL 218*   < > 113*    < > = values in this interval not displayed.     Imaging Results (Last 72 Hours)       Procedure Component Value Units Date/Time    XR Chest 1 View [[id number]] Collected: 09/20/23 0613     Updated: 09/20/23 0617    Narrative:      XR CHEST 1 VW    Date of Exam: 9/20/2023 6:04 AM EDT    Indication: Hypoxia    Comparison: 9/14/2023.    Findings:  There is evidence of prior median sternotomy. The heart is enlarged. There is new perihilar airspace disease bilaterally, right greater than left. There is increased small left and new small right pleural effusion. No pneumothorax. No acute osseous   abnormality.      Impression:      Impression:  Cardiomegaly with new perihilar pulmonary edema, increased small left and new small right pleural effusions.      Electronically Signed: Hakan De La Cruz MD    9/20/2023 6:13 AM  EDT    Workstation ID: [id number]              Lab 09/14/23  0049   HEMOGLOBIN A1C 6.20*         Diagnostics: Reviewed    A:   NSTEMI (non-ST elevated myocardial infarction)    CKD (chronic kidney disease)    Coronary artery disease    Diabetes mellitus    Emphysema of lung    Gout    Hypertension    Atelectasis    Pleural effusion    Acute respiratory failure    Type 1 myocardial infarction     82 y.o. female with NSTEMI, CKD, CAD, acute respiratory failure.   S/S:   Pain -chest pain, resolving  -Ranexa 500mg PO BID  -continue Hydrocodone-Acetaminophen 7.5-325mg PO TID prn moderate pain  -started Morphine 5mg PO q 3 hours prn dyspnea/pain or Morphine 2mg IV q 1 hours prn severe pain/dyspnea     2. Dyspnea -currently on 6LNC  -may give Hydrocodone or Morphine prn dyspnea  -nebs     3. Anxiety -continue Xanax 0.5mg PO TID prn anxiety     4. Debility -PT/OT following, recommending SNF at d/c     5. GOC -DNR/DNI -per discussion   -patient requesting to go home with hospice as soon as possible  -hospice consult placed    P: Follow up visit for GOC in the context of complex medical decision making. Patient asking to go home with hospice. Her daughter at bedside confirms wish to follow patient's wishes. Discussed hospice services and hospice consult placed.   Palliative Care Team will continue to follow patient. Please do not hesitate to contact us regarding further sx mgmt or GOC needs.  Masha Snowden, APRN  9/20/2023  Time spent: 35 minutes

## 2023-09-20 NOTE — PLAN OF CARE
Goal Outcome Evaluation:  Plan of Care Reviewed With: patient, daughter  Progress: declining  Pt continues having chest pain along with increased oxygen requirements. Pt went from 6L nasal cannula to non-rebreather, APRN notified, ABG 7.2 prior to BiPAP. BiPAP FiO2 is 70%. Chest pain relieved with PRN morphine x3. PRN Norco given x1 for back pain.  mL. NS d/c due to pulmonary edema. Afebrile. Daughter at bedside and updated.

## 2023-09-20 NOTE — PROGRESS NOTES
Continued Stay Note  [facility]     Patient Name: Betty Rod  MRN: [id number]  Today's Date: 9/20/2023    Admit Date: 9/13/2023    Plan: Home with [facility]   Discharge Plan       Row Name 09/20/23 1318       Plan    Plan Home with [facility]    Final Discharge Disposition Code 50 - home with hospice    Final Note Informed by [facility] transport that [facility] can transport pt home today at 1400. Informed pt's care team and pt and daughter Lucina of transportation arrangements. Reinforced to Lucina to call hospice when pt arrives home. EMS/DNR form given to staff nurse Sandra, to be given to [facility].  Please call [phone] if can be of further assistance.      Row Name 09/20/23 1132       Plan    Plan     Patient/Family in Agreement with Plan     Plan Comments     Final Discharge Disposition Code       Row Name 09/20/23 1128       Plan    Final Discharge Disposition Code 5      Row Name 09/20/23 1106       Plan    Plan     Plan Comments                    Discharge Codes    No documentation.                 Expected Discharge Date and Time       Expected Discharge Date Expected Discharge Time    Sep 20, 2023               Veronika Kong RN

## 2023-09-20 NOTE — DISCHARGE SUMMARY
DISCHARGE SUMMARY       Patient name: Betty Rod  CSN: [id number]  MRN: [id number]  : 1940    Date of Admission: 2023  Date of Discharge:  2023    Admitting Physician:  Eliane Garcia DO   Primary Care Provider: Boy Esparza PA  Consultations:   Masha Snowden APRN  Palliative Care  Jacinta Jamison MD  Cardiology    Admission Diagnosis:   NSTEMI    Discharge Diagnoses:     NSTEMI (non-ST elevated myocardial infarction)    CKD (chronic kidney disease)    Coronary artery disease    Diabetes mellitus    Emphysema of lung    Gout    Hypertension    Atelectasis    Pleural effusion    Acute respiratory failure    Type 1 myocardial infarction      Procedures:  Procedure(s):  9/15/23 Percutaneous Coronary Intervention - Left Main  9/15/23 Optical Coherence Tomography     Imaging:  XR Chest 1 View    Result Date: 2023  Impression: Cardiomegaly with new perihilar pulmonary edema, increased small left and new small right pleural effusions. Electronically Signed: Hakan De La Cruz MD  2023 6:13 AM EDT  Workstation ID: [id number]    XR Chest 1 View    Result Date: 2023  Impression: Mild bilateral basilar atelectasis greater on the left than the right with a trace left effusion. Electronically Signed: Reji Vera MD  2023 3:22 AM EDT  Workstation ID: [id number]    XR chest AP portable    Result Date: 2023  Bilateral opacities may represent edema or pneumonia. Images reviewed, interpreted, and dictated by Dr. Katy Avilez. Transcribed by Boy Morales PA-C.    US renal complete bilateral    Result Date: 9/10/2023  1. Abnormal cortical echogenicity, consistent with medical renal disease. Images reviewed, interpreted, and dictated by Og Moran MD    CT chest without IV contrast    Result Date: 2023  No acute process. Individualized dose reduction techniques using automated exposure control or adjustment of mA and/or kV according to the patient size were employed. FINDINGS:  CHEST: Status post median sternotomy. The thyroid is prominent. The  heart size is normal. There is no pericardial or pleural effusion. There is no adenopathy. There is a 33 mm fat-containing lesion in the right adrenal consistent with a myelolipoma. There is renal cortical thinning bilaterally. The gallbladder is absent. There are multiple tiny subpleural nodules in the right lung. Images reviewed, interpreted, and dictated by CORINA Mccormick MD    XR chest AP portable    Result Date: 9/9/2023  Abnormality as detailed above. A follow-up two-view chest x-ray is recommended. Images reviewed, interpreted, and dictated by Dr. CORINA Mccormick. Transcribed by Federico Ramos PA-C.     History of Present Illness:  Betty Rod is a 82 y.o. female who has a PMHX of CAD (s/p CABG) who was transferred to our facility for work up for potential high-risk PCI.       Patient was admitted to [facility] on 9/8 from [facility] w/ NSTEMI with a LHC on 9/12 which showed multiple re-occlusions.  No intervention was performed.  She developed increased CP and troponin elevation on 9/13 and so was transferred to our facility for higher level of care.     Records available from [facility] are only up to 9/9. They indiate that she may have had a UTI upon admission and was started on Rocephin.   (End of copied text).    Hospital Course:  Upon arrival to [facility], Cardiology did not believe she was a candidate for PCI and recommended medical management. She continued to have ongoing chest pain and Cardiology re-evaluated her and she was taken for LHC on 9/15/23. She was found to have left main stenosis and underwent PCI with drug-eluting stent. PTCA of mid-circumflex was attempted but unsuccessful. She was monitored in the ICU after procedure where she continued to have intermittent chest pain during her stay. She received nitro and morphine for symptom relief. Eventually started on Ranexa, medical management maximized by  "Cardiology, and symptoms mostly improved.     Palliative care brought on board for further decision making and GOC. PT/OT recommended SNF for d/c plans. Patient and family okay with short term rehab with the ultimate goal of getting her home. Code status changed to DNR/DNI as well. Overnight on 9/19/23, patient rapidly declined requiring increasing oxygen requirements. Appeared to be fluid overload and Bipap placed. She had increasing chest pain relieved after multiple doses of Morphine. Patient diuresed. After this, patient improved.     The following morning on 9/20/23, patient and family met again and ultimately decided to take the [facility]. This was arranged by Hospice and patient left via EMS transport on 9/20/23. Comfort medications placed by palliative / hospice team. Other medications as below.     Vitals:  /76 (BP Location: Right arm, Patient Position: Lying)   Pulse 58   Temp 97.9 °F (36.6 °C) (Oral)   Resp 20   Ht 154.9 cm (61\")   Wt 78.6 kg (173 lb 4.5 oz)   SpO2 97%   BMI 32.74 kg/m²     Physical Exam:  Constitutional:  Appears well-developed and well-nourished. No distress.   HEENT:  Normocephalic and atraumatic. PERRL  Neck: Normal range of motion. Neck supple. No JVD present.   Cardiovascular: Normal rate, regular rhythm, normal heart sounds and intact distal pulses.  No gallop and no friction rub.  No murmur heard.  Pulmonary/Chest: Effort normal with mild crackles in lung bases. No respiratory distress. No wheezes, rhonchi or rales.   Abdominal: Soft. No distension and no mass. There is no tenderness.   Musculoskeletal: Normal range of motion.   Neurological: Alert. No focal deficits. Moves all extremities.   Skin: Skin is warm and dry. No rash noted.   Extremities:  No clubbing, edema or cyanosis  Psychiatric: Normal mood and affect. Behavior is normal.     Labs:  Results from last 7 days   Lab Units 09/20/23  0321   WBC 10*3/mm3 13.73*   HEMOGLOBIN g/dL 8.8* "   HEMATOCRIT % 28.8*   PLATELETS 10*3/mm3 264     Results from last 7 days   Lab Units 09/20/23  0321 09/19/23  0254 09/18/23  0317   SODIUM mmol/L 141   < > 141   POTASSIUM mmol/L 4.4   < > 4.6   CHLORIDE mmol/L 107   < > 106   CO2 mmol/L 21.0*   < > 26.0   BUN mg/dL 49*   < > 37*   CREATININE mg/dL 2.09*   < > 1.89*   CALCIUM mg/dL 8.7   < > 8.7   BILIRUBIN mg/dL  --   --  0.4   ALK PHOS U/L  --   --  58   ALT (SGPT) U/L  --   --  15   AST (SGOT) U/L  --   --  24   GLUCOSE mg/dL 218*   < > 113*    < > = values in this interval not displayed.         Magnesium   Date Value Ref Range Status   09/20/2023 2.3 1.6 - 2.4 mg/dL Final   09/19/2023 2.4 1.6 - 2.4 mg/dL Final   09/18/2023 2.2 1.6 - 2.4 mg/dL Final     Phosphorus   Date Value Ref Range Status   09/18/2023 3.2 2.5 - 4.5 mg/dL Final           Discharge Medications:     Discharge Medications        New Medications        Instructions Start Date   Aspirin Low Dose 81 MG chewable tablet  Generic drug: aspirin  Notes to patient: Next dose 9/21 AM   Chew & swallow 1 tablet by mouth Daily.   Start Date: September 21, 2023     clopidogrel 75 MG tablet  Commonly known as: PLAVIX  Notes to patient: Next dose 9/21 AM   75 mg, Oral, Daily   Start Date: September 21, 2023     isosorbide mononitrate 120 MG 24 hr tablet  Commonly known as: IMDUR  Notes to patient: Next dose 9/21 AM   120 mg, Oral, Every 24 Hours Scheduled   Start Date: September 21, 2023     morphine solution concentrated solution  Notes to patient: Take as directed. Last IV dose given at 9/20 0433AM   5 mg, Oral, Every 3 Hours PRN      ranolazine 500 MG 12 hr tablet  Commonly known as: RANEXA  Notes to patient: Next dose 9/21 PM   500 mg, Oral, Every 12 Hours Scheduled             Changes to Medications        Instructions Start Date   ALPRAZolam 0.5 MG tablet  Commonly known as: Xanax  What changed: reasons to take this  Notes to patient: As needed - can have next dose at 9pm   0.5 mg, Oral, 3 Times Daily  PRN      metoprolol tartrate 100 MG tablet  Commonly known as: LOPRESSOR  What changed:   medication strength  how much to take  when to take this  Notes to patient: Next dose 9/21 PM   100 mg, Oral, Every 12 Hours Scheduled             Continue These Medications        Instructions Start Date   allopurinol 100 MG tablet  Commonly known as: ZYLOPRIM  Notes to patient: Next dose 9/21 AM   100 mg, Oral, Daily      amLODIPine 5 MG tablet  Commonly known as: NORVASC  Notes to patient: Next dose 9/21 AM   5 mg, Oral, Daily      HYDROcodone-acetaminophen 7.5-325 MG per tablet  Commonly known as: NORCO  Notes to patient: Take as directed   1 tablet, Oral, 3 Times Daily PRN      pravastatin 40 MG tablet  Commonly known as: PRAVACHOL  Notes to patient: Take as directed   40 mg, Oral, Daily             Stop These Medications      bumetanide 0.5 MG tablet  Commonly known as: BUMEX     ramipril 2.5 MG capsule  Commonly known as: ALTACE     vitamin D 1.25 MG (22649 UT) capsule capsule  Commonly known as: ERGOCALCIFEROL              Discharge Diet:   Diet Instructions       Diet: Regular/House Diet; Regular Texture (IDDSI 7); Thin (IDDSI 0)      Discharge Diet: Regular/House Diet    Texture: Regular Texture (IDDSI 7)    Fluid Consistency: Thin (IDDSI 0)            Activity at Discharge:    Activity Instructions       Activity as Tolerated              Follow-up Appointments  No future appointments.      Discharge Instructions:  Ok for d/c  Medications as above  Further d/c instructions as above    Current Code Status       Date Active Code Status Order ID Comments User Context       9/20/2023 0906 No CPR (Do Not Attempt to Resuscitate) [id number]  Masha Snowden, DANNY Inpatient        Question Answer    Code Status (Patient has no pulse and is not breathing) No CPR (Do Not Attempt to Resuscitate)    Medical Interventions (Patient has pulse or is breathing) Limited Support    Medical Intervention Limits: NO intubation (DNI)     NO  dialysis     NO NIPPV (Non-Invasive Positive Pressure Ventilation)    Level Of Support Discussed With Patient     Next of Kin (If No Surrogate)                     Raleigh Tejada, MSN, APRN, [facility]  Pulmonary and Critical Care Medicine  09/20/23     Time: Discharge 31 min    CC: Boy Esparza, PA    Please note that portions of this note were completed with a voice recognition program.      Reviewed and agree.

## 2023-09-20 NOTE — PROGRESS NOTES
I have sent the following prescription to [facility] phamacy/meds to beds to assist with discharge home with hospice after consultation with DANNY Snowden:    OMS 5mg q3h PRN pain/dyspnea  Xanax 0.5m TID PRN anxiety    Kathy Victoria MD

## (undated) DEVICE — ADULT, W/LG. BACK PAD, RADIOTRANSPARENT ELEMENT AND LEAD WIRE: Brand: DEFIBRILLATION ELECTRODES

## (undated) DEVICE — CVR PROB ULTRASND/TRANSD W/GEL 7X11IN STRL

## (undated) DEVICE — NC TREK NEO™ CORONARY DILATATION CATHETER 3.00 MM X 12 MM / RAPID-EXCHANGE: Brand: NC TREK NEO™

## (undated) DEVICE — MODEL AT P65, P/N 701554-001KIT CONTENTS: HAND CONTROLLER, 3-WAY HIGH-PRESSURE STOPCOCK WITH ROTATING END AND PREMIUM HIGH-PRESSURE TUBING: Brand: ANGIOTOUCH® KIT

## (undated) DEVICE — NC TREK NEO™ CORONARY DILATATION CATHETER 4.00 MM X 8 MM / RAPID-EXCHANGE: Brand: NC TREK NEO™

## (undated) DEVICE — GUIDE CATHETER: Brand: MACH1™

## (undated) DEVICE — KT CATH IMG DRAGONFLY/OPSTAR 2.7F 135CM

## (undated) DEVICE — GW INQWIRE FC PTFE STD J/1.5 .035 260

## (undated) DEVICE — MODEL BT2000 P/N 700287-012KIT CONTENTS: MANIFOLD WITH SALINE AND CONTRAST PORTS, SALINE TUBING WITH SPIKE AND HAND SYRINGE, TRANSDUCER: Brand: BT2000 AUTOMATED MANIFOLD KIT

## (undated) DEVICE — MINI TREK CORONARY DILATATION CATHETER 1.50 MM X 12 MM / RAPID-EXCHANGE: Brand: MINI TREK

## (undated) DEVICE — Device: Brand: ASAHI SION BLUE

## (undated) DEVICE — PK CATH CARD 10

## (undated) DEVICE — GUIDELINER CATHETERS ARE INTENDED TO BE USED IN CONJUNCTION WITH GUIDE CATHETERS TO ACCESS DISCRETE REGIONS OF THE CORONARY AND/OR PERIPHERAL VASCULATURE, AND TO FACILITATE PLACEMENT OF INTERVENTIONAL DEVICES.: Brand: GUIDELINER® V3 CATHETER

## (undated) DEVICE — GLIDESHEATH SLENDER STAINLESS STEEL KIT: Brand: GLIDESHEATH SLENDER

## (undated) DEVICE — DEV COMPR RADL PRELUDESYNCEZ 30ML 32CM

## (undated) DEVICE — GW PT 2 MS .014 185CM STR TP

## (undated) DEVICE — DEV INFL MONARCH 25W